# Patient Record
Sex: MALE | Race: WHITE
[De-identification: names, ages, dates, MRNs, and addresses within clinical notes are randomized per-mention and may not be internally consistent; named-entity substitution may affect disease eponyms.]

---

## 2019-03-01 ENCOUNTER — HOSPITAL ENCOUNTER (INPATIENT)
Dept: HOSPITAL 91 - 2NE | Age: 59
LOS: 21 days | Discharge: HOME | DRG: 330 | End: 2019-03-22
Payer: COMMERCIAL

## 2019-03-01 ENCOUNTER — HOSPITAL ENCOUNTER (INPATIENT)
Dept: HOSPITAL 10 - 6WM | Age: 59
LOS: 21 days | Discharge: HOME | DRG: 330 | End: 2019-03-22
Attending: INTERNAL MEDICINE | Admitting: HOSPITALIST
Payer: COMMERCIAL

## 2019-03-01 VITALS
HEIGHT: 74 IN | HEIGHT: 74 IN | WEIGHT: 182.32 LBS | BODY MASS INDEX: 23.4 KG/M2 | WEIGHT: 182.32 LBS | BODY MASS INDEX: 23.4 KG/M2

## 2019-03-01 VITALS — SYSTOLIC BLOOD PRESSURE: 95 MMHG | DIASTOLIC BLOOD PRESSURE: 54 MMHG | RESPIRATION RATE: 18 BRPM | HEART RATE: 89 BPM

## 2019-03-01 VITALS — HEART RATE: 92 BPM

## 2019-03-01 DIAGNOSIS — F17.200: ICD-10-CM

## 2019-03-01 DIAGNOSIS — E10.22: ICD-10-CM

## 2019-03-01 DIAGNOSIS — N18.2: ICD-10-CM

## 2019-03-01 DIAGNOSIS — E78.5: ICD-10-CM

## 2019-03-01 DIAGNOSIS — K62.5: ICD-10-CM

## 2019-03-01 DIAGNOSIS — D50.9: ICD-10-CM

## 2019-03-01 DIAGNOSIS — I25.10: ICD-10-CM

## 2019-03-01 DIAGNOSIS — E10.65: ICD-10-CM

## 2019-03-01 DIAGNOSIS — C18.4: Primary | ICD-10-CM

## 2019-03-01 DIAGNOSIS — K43.9: ICD-10-CM

## 2019-03-01 DIAGNOSIS — I12.9: ICD-10-CM

## 2019-03-01 DIAGNOSIS — R63.4: ICD-10-CM

## 2019-03-01 PROCEDURE — 80053 COMPREHEN METABOLIC PANEL: CPT

## 2019-03-01 PROCEDURE — 93306 TTE W/DOPPLER COMPLETE: CPT

## 2019-03-01 PROCEDURE — 84100 ASSAY OF PHOSPHORUS: CPT

## 2019-03-01 PROCEDURE — 88309 TISSUE EXAM BY PATHOLOGIST: CPT

## 2019-03-01 PROCEDURE — 78452 HT MUSCLE IMAGE SPECT MULT: CPT

## 2019-03-01 PROCEDURE — 71260 CT THORAX DX C+: CPT

## 2019-03-01 PROCEDURE — 80061 LIPID PANEL: CPT

## 2019-03-01 PROCEDURE — 85730 THROMBOPLASTIN TIME PARTIAL: CPT

## 2019-03-01 PROCEDURE — 86901 BLOOD TYPING SEROLOGIC RH(D): CPT

## 2019-03-01 PROCEDURE — 93017 CV STRESS TEST TRACING ONLY: CPT

## 2019-03-01 PROCEDURE — 85610 PROTHROMBIN TIME: CPT

## 2019-03-01 PROCEDURE — 76775 US EXAM ABDO BACK WALL LIM: CPT

## 2019-03-01 PROCEDURE — 86900 BLOOD TYPING SEROLOGIC ABO: CPT

## 2019-03-01 PROCEDURE — 97161 PT EVAL LOW COMPLEX 20 MIN: CPT

## 2019-03-01 PROCEDURE — A9500 TC99M SESTAMIBI: HCPCS

## 2019-03-01 PROCEDURE — 85670 THROMBIN TIME PLASMA: CPT

## 2019-03-01 PROCEDURE — 88305 TISSUE EXAM BY PATHOLOGIST: CPT

## 2019-03-01 PROCEDURE — 81003 URINALYSIS AUTO W/O SCOPE: CPT

## 2019-03-01 PROCEDURE — 74018 RADEX ABDOMEN 1 VIEW: CPT

## 2019-03-01 PROCEDURE — 85049 AUTOMATED PLATELET COUNT: CPT

## 2019-03-01 PROCEDURE — 88304 TISSUE EXAM BY PATHOLOGIST: CPT

## 2019-03-01 PROCEDURE — 86920 COMPATIBILITY TEST SPIN: CPT

## 2019-03-01 PROCEDURE — 90686 IIV4 VACC NO PRSV 0.5 ML IM: CPT

## 2019-03-01 PROCEDURE — 83036 HEMOGLOBIN GLYCOSYLATED A1C: CPT

## 2019-03-01 PROCEDURE — 36600 WITHDRAWAL OF ARTERIAL BLOOD: CPT

## 2019-03-01 PROCEDURE — 93971 EXTREMITY STUDY: CPT

## 2019-03-01 PROCEDURE — 93005 ELECTROCARDIOGRAM TRACING: CPT

## 2019-03-01 PROCEDURE — 84484 ASSAY OF TROPONIN QUANT: CPT

## 2019-03-01 PROCEDURE — 82728 ASSAY OF FERRITIN: CPT

## 2019-03-01 PROCEDURE — 80048 BASIC METABOLIC PNL TOTAL CA: CPT

## 2019-03-01 PROCEDURE — 82803 BLOOD GASES ANY COMBINATION: CPT

## 2019-03-01 PROCEDURE — 36430 TRANSFUSION BLD/BLD COMPNT: CPT

## 2019-03-01 PROCEDURE — 83735 ASSAY OF MAGNESIUM: CPT

## 2019-03-01 PROCEDURE — 0DBL8ZX EXCISION OF TRANSVERSE COLON, VIA NATURAL OR ARTIFICIAL OPENING ENDOSCOPIC, DIAGNOSTIC: ICD-10-PCS | Performed by: INTERNAL MEDICINE

## 2019-03-01 PROCEDURE — 78306 BONE IMAGING WHOLE BODY: CPT

## 2019-03-01 PROCEDURE — 82378 CARCINOEMBRYONIC ANTIGEN: CPT

## 2019-03-01 PROCEDURE — A9503 TC99M MEDRONATE: HCPCS

## 2019-03-01 PROCEDURE — P9016 RBC LEUKOCYTES REDUCED: HCPCS

## 2019-03-01 PROCEDURE — P9045 ALBUMIN (HUMAN), 5%, 250 ML: HCPCS

## 2019-03-01 PROCEDURE — 85025 COMPLETE CBC W/AUTO DIFF WBC: CPT

## 2019-03-01 PROCEDURE — 82962 GLUCOSE BLOOD TEST: CPT

## 2019-03-01 PROCEDURE — 74250 X-RAY XM SM INT 1CNTRST STD: CPT

## 2019-03-01 PROCEDURE — 74177 CT ABD & PELVIS W/CONTRAST: CPT

## 2019-03-01 PROCEDURE — 87086 URINE CULTURE/COLONY COUNT: CPT

## 2019-03-01 PROCEDURE — A9505 TL201 THALLIUM: HCPCS

## 2019-03-01 PROCEDURE — 83540 ASSAY OF IRON: CPT

## 2019-03-01 PROCEDURE — 86850 RBC ANTIBODY SCREEN: CPT

## 2019-03-01 NOTE — HP
Date/Time of Note


Date/Time of Note


DATE: 3/1/19 


TIME: 23:58





Assessment/Plan


VTE Prophylaxis


Pharmacological prophylaxis:  heparin





Lines/Catheters


IV Catheter Type (from Nrs):  Peripheral IV





Assessment/Plan


Assessment/Plan





58-year-old male with history of diabetes presents with abdominal pain, 


diarrhea, rectal bleeding presented to an outside hospital with CT showing 


possible colon lesion/mass





PLAN


GI consult for colonoscopy


Consider additional abdominal imaging


FOBT


PPI


Insulin for diabetes





HPI/ROS


Admit Date/Time


Admit Date/Time


Mar 1, 2019 at 20:38





Hx of Present Illness





58-year-old Farsi speaking male with history of type 1 diabetes who initially 


presented on outside hospital for abdominal pain, diarrhea, and rectal bleeding.


 He was transferred to [facility] for insurance reasons.  At 


the outside facility CT shows possible colon mass/lesion.  Note that part of the


information was gathered from talking to patient's daughter.  Patient was 


supposed to fly back to ER on a daily, but was canceled.  He needed to be 


convinced to stay here by his daughter.  Currently patient looks comfortable.





PMH/Family/Social


Past Medical History


Medications





Current Medications


Sodium Chloride 1,000 ml @  100 mls/hr Q10H IV ;  Start 3/1/19 at 23:43;  Status


UNV


IV Flush (NS 3 ml) 3 ml PER PROTOCOL IV ;  Start 3/2/19 at 00:00;  Status UNV


Ondansetron HCl (Zofran Inj) 4 mg Q6H  PRN IV NAUSEA/VOMITING;  Start 3/2/19 at 


00:00;  Status UNV


Acetaminophen (Tylenol Tab) 650 mg Q6H  PRN PO .PAIN 1-3 OR TEMP;  Start 3/2/19 


at 00:00;  Status UNV


Acetaminophen/ Hydrocodone Bitart (Norco (5/325)) 1 tab Q6H  PRN PO .PAIN 4-6;  


Start 3/2/19 at 00:00;  Status UNV


Zolpidem Tartrate (Ambien) 10 mg QHS  PRN PO .INSOMNIA;  Start 3/2/19 at 00:00; 


Status UNV


Famotidine (Pepcid Iv) 20 mg Q12 IV ;  Start 3/2/19 at 09:00;  Status UNV


Albuterol/ Ipratropium (Duoneb) 3 ml Q2H RESP THERAPY  PRN HHN SHORTNESS OF 


BREATH;  Start 3/2/19 at 00:00;  Status UNV


Miscellaneous Information (* Miscellaneous Pharmacy Order) Discontinue current 


oral sulfonylur... ONCE  ONCE XX ;  Start 3/2/19 at 00:00;  Stop 3/2/19 at 


00:01;  Status UNV


Diagnostic Test (Pha) (Accu-Chek) 1 ea 02 XX ;  Start 3/2/19 at 02:00;  Status 


UNV


Miscellaneous Information (* Miscellaneous Pharmacy Order) HYPOGLYCEMIA PROTOCOL


w... ONCE  ONCE XX ;  Start 3/2/19 at 00:00;  Stop 3/2/19 at 00:01;  Status UNV


Insulin Aspart (Novolog Insulin Pen) NOVOLOG *MILD* ALGORI... Q6 SC ;  Start 


3/2/19 at 00:00;  Status UNV


Miscellaneous Information (* Miscellaneous Pharmacy Order) Discontinue all 


previ... ONCE  ONCE XX ;  Start 3/2/19 at 00:00;  Stop 3/2/19 at 00:01;  Status 


UNV


Coded Allergies:  


     No Known Allergy (Unverified , 3/1/19)





Social History


Smoking Status:  Current every day smoker





Exam/Review of Systems


Vital Signs


Vitals





Vital Signs


  Date      Temp  Pulse  Resp  B/P (MAP)   Pulse Ox  O2          O2 Flow    FiO2


Time                                                 Delivery    Rate


    3/1/19           92


     20:58


    3/1/19  99.8           18  95/54 (68)        98  Room Air


     20:55








Exam


Exam


Past Medical History: see hpi


Past Surgical History


Past Surgical Hx:  other (see hpi)





Family History


Significant Family History:  no pertinent family hx





Social History


Alcohol Use:  other


Smoking Status:  Unknown if ever smoked


Drug Use:  other


Medications





Exam


Eyes:  PERRL


ENMT:  mucosa pink and moist


Respiratory:  normal air movement


Cardiovascular:  nl pulses


Gastrointestinal:  soft


Extremities:  normal pulses











HELLEN LUIS MD                  Mar 1, 2019 23:58

## 2019-03-02 VITALS — SYSTOLIC BLOOD PRESSURE: 90 MMHG | DIASTOLIC BLOOD PRESSURE: 55 MMHG | RESPIRATION RATE: 18 BRPM | HEART RATE: 73 BPM

## 2019-03-02 VITALS — DIASTOLIC BLOOD PRESSURE: 61 MMHG | SYSTOLIC BLOOD PRESSURE: 102 MMHG | RESPIRATION RATE: 18 BRPM | HEART RATE: 86 BPM

## 2019-03-02 VITALS — DIASTOLIC BLOOD PRESSURE: 50 MMHG | RESPIRATION RATE: 20 BRPM | SYSTOLIC BLOOD PRESSURE: 90 MMHG | HEART RATE: 74 BPM

## 2019-03-02 VITALS — HEART RATE: 84 BPM

## 2019-03-02 VITALS — HEART RATE: 82 BPM

## 2019-03-02 VITALS — SYSTOLIC BLOOD PRESSURE: 97 MMHG | RESPIRATION RATE: 18 BRPM | DIASTOLIC BLOOD PRESSURE: 56 MMHG | HEART RATE: 82 BPM

## 2019-03-02 VITALS — DIASTOLIC BLOOD PRESSURE: 68 MMHG | RESPIRATION RATE: 18 BRPM | HEART RATE: 82 BPM | SYSTOLIC BLOOD PRESSURE: 109 MMHG

## 2019-03-02 VITALS — HEART RATE: 75 BPM

## 2019-03-02 VITALS — HEART RATE: 67 BPM

## 2019-03-02 VITALS — HEART RATE: 77 BPM

## 2019-03-02 VITALS — HEART RATE: 66 BPM

## 2019-03-02 LAB
ADD MAN DIFF?: NO
ALANINE AMINOTRANSFERASE: 17 IU/L (ref 13–69)
ALBUMIN/GLOBULIN RATIO: 1.03
ALBUMIN: 3.2 G/DL (ref 3.3–4.9)
ALKALINE PHOSPHATASE: 81 IU/L (ref 42–121)
ANION GAP: 8 (ref 5–13)
ASPARTATE AMINO TRANSFERASE: 11 IU/L (ref 15–46)
BASOPHIL #: 0 10^3/UL (ref 0–0.1)
BASOPHILS %: 0.2 % (ref 0–2)
BILIRUBIN,DIRECT: 0 MG/DL (ref 0–0.2)
BILIRUBIN,TOTAL: 0.1 MG/DL (ref 0.2–1.3)
BLOOD UREA NITROGEN: 16 MG/DL (ref 7–20)
CALCIUM: 9.1 MG/DL (ref 8.4–10.2)
CARBON DIOXIDE: 23 MMOL/L (ref 21–31)
CARCINOEMBRYONIC ANTIGEN: 2.2 NG/ML (ref 0–5)
CHLORIDE: 106 MMOL/L (ref 97–110)
CHOL/HDL RATIO: 7.6 RATIO
CHOLESTEROL: 137 MG/DL (ref 100–200)
CREATININE: 1.44 MG/DL (ref 0.61–1.24)
EOSINOPHILS #: 0.1 10^3/UL (ref 0–0.5)
EOSINOPHILS %: 0.7 % (ref 0–7)
GLOBULIN: 3.1 G/DL (ref 1.3–3.2)
GLUCOSE: 245 MG/DL (ref 70–220)
HDL CHOLESTEROL: 18 MG/DL (ref 30–78)
HEMATOCRIT: 26.7 % (ref 42–52)
HEMOGLOBIN A1C: 6.7 % (ref 0–5.9)
HEMOGLOBIN: 8.1 G/DL (ref 14–18)
IMMATURE GRANS #M: 0.05 10^3/UL (ref 0–0.03)
IMMATURE GRANS % (M): 0.5 % (ref 0–0.43)
LDL CHOLESTEROL,CALCULATED: 92 MG/DL
LYMPHOCYTES #: 2.3 10^3/UL (ref 0.8–2.9)
LYMPHOCYTES %: 21.6 % (ref 15–51)
MEAN CORPUSCULAR HEMOGLOBIN: 24 PG (ref 29–33)
MEAN CORPUSCULAR HGB CONC: 30.3 G/DL (ref 32–37)
MEAN CORPUSCULAR VOLUME: 79 FL (ref 82–101)
MEAN PLATELET VOLUME: 9.5 FL (ref 7.4–10.4)
MONOCYTE #: 0.7 10^3/UL (ref 0.3–0.9)
MONOCYTES %: 7 % (ref 0–11)
NEUTROPHIL #: 7.3 10^3/UL (ref 1.6–7.5)
NEUTROPHILS %: 70 % (ref 39–77)
NUCLEATED RED BLOOD CELLS #: 0 10^3/UL (ref 0–0)
NUCLEATED RED BLOOD CELLS%: 0 /100WBC (ref 0–0)
PLATELET COUNT: 326 10^3/UL (ref 140–415)
POTASSIUM: 5.1 MMOL/L (ref 3.5–5.1)
RED BLOOD COUNT: 3.38 10^6/UL (ref 4.7–6.1)
RED CELL DISTRIBUTION WIDTH: 16.2 % (ref 11.5–14.5)
SODIUM: 137 MMOL/L (ref 135–144)
TOTAL PROTEIN: 6.3 G/DL (ref 6.1–8.1)
TRIGLYCERIDES: 136 MG/DL (ref 0–149)
WHITE BLOOD COUNT: 10.5 10^3/UL (ref 4.8–10.8)

## 2019-03-02 RX ADMIN — DIPHENHYDRAMINE HYDROCHLORIDE SCH MG: 50 INJECTION, SOLUTION INTRAMUSCULAR; INTRAVENOUS at 21:51

## 2019-03-02 RX ADMIN — FOLIC ACID SCH MLS/HR: 5 INJECTION, SOLUTION INTRAMUSCULAR; INTRAVENOUS; SUBCUTANEOUS at 09:56

## 2019-03-02 RX ADMIN — ACETAMINOPHEN 1 MG: 325 TABLET, FILM COATED ORAL at 01:03

## 2019-03-02 RX ADMIN — THIAMINE HYDROCHLORIDE 1 MLS/HR: 100 INJECTION, SOLUTION INTRAMUSCULAR; INTRAVENOUS at 09:56

## 2019-03-02 RX ADMIN — INSULIN ASPART 1 UNIT: 100 INJECTION, SOLUTION INTRAVENOUS; SUBCUTANEOUS at 06:29

## 2019-03-02 RX ADMIN — THIAMINE HYDROCHLORIDE 1 MLS/HR: 100 INJECTION, SOLUTION INTRAMUSCULAR; INTRAVENOUS at 21:51

## 2019-03-02 RX ADMIN — THIAMINE HYDROCHLORIDE 1 MLS/HR: 100 INJECTION, SOLUTION INTRAMUSCULAR; INTRAVENOUS at 00:05

## 2019-03-02 RX ADMIN — INSULIN GLARGINE 1 UNITS: 100 INJECTION, SOLUTION SUBCUTANEOUS at 12:49

## 2019-03-02 RX ADMIN — FAMOTIDINE 1 MG: 10 INJECTION, SOLUTION INTRAVENOUS at 21:51

## 2019-03-02 RX ADMIN — FAMOTIDINE 1 MG: 10 INJECTION, SOLUTION INTRAVENOUS at 09:55

## 2019-03-02 RX ADMIN — INSULIN ASPART 1 UNIT: 100 INJECTION, SOLUTION INTRAVENOUS; SUBCUTANEOUS at 12:50

## 2019-03-02 RX ADMIN — INSULIN ASPART 1 UNIT: 100 INJECTION, SOLUTION INTRAVENOUS; SUBCUTANEOUS at 01:05

## 2019-03-02 RX ADMIN — INSULIN ASPART 1 UNIT: 100 INJECTION, SOLUTION INTRAVENOUS; SUBCUTANEOUS at 21:56

## 2019-03-02 RX ADMIN — FOLIC ACID SCH MLS/HR: 5 INJECTION, SOLUTION INTRAMUSCULAR; INTRAVENOUS; SUBCUTANEOUS at 21:51

## 2019-03-02 RX ADMIN — ZOLPIDEM TARTRATE 1 MG: 5 TABLET, FILM COATED ORAL at 00:34

## 2019-03-02 RX ADMIN — INSULIN ASPART 1 UNIT: 100 INJECTION, SOLUTION INTRAVENOUS; SUBCUTANEOUS at 17:29

## 2019-03-02 RX ADMIN — FOLIC ACID SCH MLS/HR: 5 INJECTION, SOLUTION INTRAMUSCULAR; INTRAVENOUS; SUBCUTANEOUS at 00:05

## 2019-03-02 RX ADMIN — ZOLPIDEM TARTRATE PRN MG: 5 TABLET, FILM COATED ORAL at 00:34

## 2019-03-02 RX ADMIN — INSULIN GLARGINE SCH UNITS: 100 INJECTION, SOLUTION SUBCUTANEOUS at 12:49

## 2019-03-02 RX ADMIN — DIPHENHYDRAMINE HYDROCHLORIDE SCH MG: 50 INJECTION, SOLUTION INTRAMUSCULAR; INTRAVENOUS at 09:55

## 2019-03-02 RX ADMIN — ZOLPIDEM TARTRATE PRN MG: 5 TABLET, FILM COATED ORAL at 21:54

## 2019-03-02 RX ADMIN — ZOLPIDEM TARTRATE 1 MG: 5 TABLET, FILM COATED ORAL at 21:54

## 2019-03-02 NOTE — CONS
Assessment/Plan


Assessment/Plan


Hospital Course (Demo Recall)


Summary Assessment and Plan:


Assessment:


Abdominal pain


Intermittent diarrhea x1 month


Abnormal imaging on noncontrast CT from [place]


   -Wall thickening of the hepatic flexure and transverse colon findings are 


suspicious for underlying mass


   -CEA negative


Unintentional weight loss (30-40 lbs over 5 months)


Microcytic hyperchromic anemia


CAD- Plavix currently on hold


DM


Renal insufficiency


Previous abdominal surgery





Plan:


Clear liquid diet


Plan for endoscopic evaluation Monday


Plan to start prep tomorrow


Endoscopy - risks/benefits/alternatives/indications of procedure and 


sedation/anesthesia discussed with patient who states understanding and gives 


informed consent to proceed. 


Patient seen in collaboration with Dr. Salinas


CC:   DERICK SALINAS ;





Consultation Date/Type/Reason


Admit Date/Time


Mar 1, 2019 at 20:38


Date of Consultation:  Mar 2, 2019


Type of Consult


GI


Reason for Consultation


Abdominal pain/diarrhea/abnormal imaging


Date/Time of Note


DATE: 3/2/19 


TIME: 13:33





Hx of Present Illness


This is a 58 year old male with PMH of DM, type 2, CAD who presented to [facility] for abdominal pain and intermittent diarrhea. Pt states pain 


began about 2 months ago, leaves or aggravated by anything in particular, with 


intermittent diarrhea for the past month.  Patient also notes a 30-40 pound 


weight loss, unintentionally over the past 5 months.  At [facility] 


patient had a CT scan abdomen/pelvis without contrast in at the hepatic flexure 


of the colon and transverse colon, there is a focal wall thickening and adjacent


fat stranding.  Findings are suspicious for an underlying mass.  Infection is 


less likely but not excluded.  There are several adjacent tiny lymph nodes which


are subcentimeter.  Findings appear unchanged compared with 1/30/19.  The right 


colon demonstrates stool and gas the appendix is not definitely visualized.  The


small bowel loops are nondilated.  Stomach demonstrates some fluid.  Answer to 


[facility] for further evaluation.  Currently patient appears 


comfortable he is on a clear liquid diet tolerating well he denies 


nausea/vomiting, hematemesis, hematochezia a stool for OB has been ordered.  


Given CT findings we will plan to proceed with colonoscopy on Monday reviewed 


risk/benefits with patient and patient's fianc both verbalized understanding 


agreeable to procedure.


Review of Systems: 


[A 12 system, review was conducted and is negative except as noted in the HPI or


 here.]





Past Medical History


Medications





Current Medications


Sodium Chloride 1,000 ml @  100 mls/hr Q10H IV  Last administered on 3/2/19at 


09:56; Admin Dose 100 MLS/HR;  Start 3/1/19 at 23:43


IV Flush (NS 3 ml) 3 ml PER PROTOCOL IV ;  Start 3/2/19 at 00:00


Ondansetron HCl (Zofran Inj) 4 mg Q6H  PRN IV NAUSEA/VOMITING;  Start 3/2/19 at 


00:00


Acetaminophen (Tylenol Tab) 650 mg Q6H  PRN PO .PAIN 1-3 OR TEMP Last 


administered on 3/2/19at 01:03; Admin Dose 650 MG;  Start 3/2/19 at 00:00


Acetaminophen/ Hydrocodone Bitart (Norco (5/325)) 1 tab Q6H  PRN PO .PAIN 4-6;  


Start 3/2/19 at 00:00


Zolpidem Tartrate (Ambien) 10 mg QHS  PRN PO .INSOMNIA Last administered on 


3/2/19at 00:34; Admin Dose 10 MG;  Start 3/2/19 at 00:00


Famotidine (Pepcid Iv) 20 mg Q12 IV  Last administered on 3/2/19at 09:55; Admin 


Dose 20 MG;  Start 3/2/19 at 09:00


Albuterol/ Ipratropium (Duoneb) 3 ml Q2H RESP THERAPY  PRN HHN SHORTNESS OF 


BREATH;  Start 3/2/19 at 00:00


Diagnostic Test (Pha) (Accu-Chek) 1 ea 02 XX ;  Start 3/2/19 at 02:00


Insulin Aspart (Novolog Insulin Pen) NOVOLOG *MILD* ALGORI... Q6 SC  Last 


administered on 3/2/19at 12:50; Admin Dose 3 UNIT;  Start 3/2/19 at 00:00


Miscellaneous Information 1 ea NOTE XX ;  Start 3/2/19 at 00:00


Glucose (Glutose) 15 gm Q15M  PRN PO DECREASED GLUCOSE;  Start 3/2/19 at 00:00


Glucose (Glutose) 22.5 gm Q15M  PRN PO DECREASED GLUCOSE;  Start 3/2/19 at 00:00


Dextrose (D50w Syringe) 25 ml Q15M  PRN IV DECREASED GLUCOSE;  Start 3/2/19 at 


00:00


Dextrose (D50w Syringe) 50 ml Q15M  PRN IV DECREASED GLUCOSE;  Start 3/2/19 at 


00:00


Glucagon (Glucagen) 1 mg Q15M  PRN IM DECREASED GLUCOSE;  Start 3/2/19 at 00:00


Glucose (Glutose) 15 gm Q15M  PRN BUCCAL DECREASED GLUCOSE;  Start 3/2/19 at 


00:00


Insulin Glargine (Lantus) 15 units DAILY@0800 SC  Last administered on 3/2/19at 


12:49; Admin Dose 15 UNITS;  Start 3/2/19 at 12:00


Allergies:  


Coded Allergies:  


     No Known Allergy (Unverified , 3/1/19)





Social History


Smoking Status:  Current every day smoker





Exam/Review of Systems


Exam


Vitals





Vital Signs


  Date      Temp  Pulse  Resp  B/P (MAP)   Pulse Ox  O2          O2 Flow    FiO2


Time                                                 Delivery    Rate


    3/2/19           77


     12:03


    3/2/19  98.5           18  90/55 (67)       100


     11:21


    3/1/19                                           Room Air


     20:55





Exam


PHYSICAL EXAMINATION:


GENERAL: Well developed, well nourished, alert & oriented x 3, in no acute 


distress


SKIN: Midline scar


EYES: Pupils equal reactive to light, no discharge.


EARS/NOSE AND THROAT: Ears normal, nose normal, oropharynx normal


NECK: Supple, no masses


CHEST: Inspection within normal limits.


CARDIOVASCULAR: Heart: Regular rate and rhythm


RESPIRATORY: Lungs clear to auscultation


GASTROINTESTINAL AND LIVER: Abdomen: Soft, non tenderness, non-distended, no 


hernias, no masses, no organomegaly, no ascites, no guarding, no rebound 


tenderness, normoactive bowel sounds. Rectal: Deferred. 





EXTREMITIES: No cyanosis, clubbing or edema.





Results


Result Diagram:  


3/2/19 0040                                                                     


          3/2/19 0040





Results 24hrs





Laboratory Tests


 Test
                                3/2/19
00:40  3/2/19
06:20  3/2/19
12:47


 White Blood Count                          10.5


 Red Blood Count                           3.38  L


 Hemoglobin                                 8.1  L


 Hematocrit                                26.7  L


 Mean Corpuscular Volume                   79.0  L


 Mean Corpuscular Hemoglobin               24.0  L


 Mean Corpuscular Hemoglobin
Concent      30.3  L
  
             



 Red Cell Distribution Width               16.2  H


 Platelet Count                              326


 Mean Platelet Volume                        9.5


 Immature Granulocytes %                  0.500  H


 Neutrophils %                              70.0


 Lymphocytes %                              21.6


 Monocytes %                                 7.0


 Eosinophils %                               0.7


 Basophils %                                 0.2


 Nucleated Red Blood Cells %                 0.0


 Immature Granulocytes #                  0.050  H


 Neutrophils #                               7.3


 Lymphocytes #                               2.3


 Monocytes #                                 0.7


 Eosinophils #                               0.1


 Basophils #                                 0.0


 Nucleated Red Blood Cells #                 0.0


 Sodium Level                                137


 Potassium Level                             5.1


 Chloride Level                              106


 Carbon Dioxide Level                         23


 Anion Gap                                     8


 Blood Urea Nitrogen                          16


 Creatinine                                1.44  H


 Est Glomerular Filtrat Rate
mL/min         50  L
  
             



 Glucose Level                              245  H


 Bedside Glucose                            248  H        240  H        252  H


 Hemoglobin A1c                             6.7  H


 Calcium Level                               9.1


 Total Bilirubin                            0.1  L


 Direct Bilirubin                           0.00


 Indirect Bilirubin                          0.1


 Aspartate Amino Transf
(AST/SGOT)          11  L
  
             



 Alanine Aminotransferase
(ALT/SGPT)         17  
  
             



 Alkaline Phosphatase                         81


 Total Protein                               6.3


 Albumin                                    3.2  L


 Globulin                                   3.10


 Albumin/Globulin Ratio                     1.03


 Triglycerides Level                         136


 Cholesterol Level                           137


 LDL Cholesterol, Calculated                  92


 HDL Cholesterol                             18  L


 Cholesterol/HDL Ratio                       7.6


 Carcinoembryonic Antigen                    2.2








Medications


Medication





Current Medications


Sodium Chloride 1,000 ml @  100 mls/hr Q10H IV  Last administered on 3/2/19at 


09:56; Admin Dose 100 MLS/HR;  Start 3/1/19 at 23:43


IV Flush (NS 3 ml) 3 ml PER PROTOCOL IV ;  Start 3/2/19 at 00:00


Ondansetron HCl (Zofran Inj) 4 mg Q6H  PRN IV NAUSEA/VOMITING;  Start 3/2/19 at 


00:00


Acetaminophen (Tylenol Tab) 650 mg Q6H  PRN PO .PAIN 1-3 OR TEMP Last 


administered on 3/2/19at 01:03; Admin Dose 650 MG;  Start 3/2/19 at 00:00


Acetaminophen/ Hydrocodone Bitart (Norco (5/325)) 1 tab Q6H  PRN PO .PAIN 4-6;  


Start 3/2/19 at 00:00


Zolpidem Tartrate (Ambien) 10 mg QHS  PRN PO .INSOMNIA Last administered on 3/2/


19at 00:34; Admin Dose 10 MG;  Start 3/2/19 at 00:00


Famotidine (Pepcid Iv) 20 mg Q12 IV  Last administered on 3/2/19at 09:55; Admin 


Dose 20 MG;  Start 3/2/19 at 09:00


Albuterol/ Ipratropium (Duoneb) 3 ml Q2H RESP THERAPY  PRN HHN SHORTNESS OF 


BREATH;  Start 3/2/19 at 00:00


Diagnostic Test (Pha) (Accu-Chek) 1 ea 02 XX ;  Start 3/2/19 at 02:00


Insulin Aspart (Novolog Insulin Pen) NOVOLOG *MILD* ALGORI... Q6 SC  Last 


administered on 3/2/19at 12:50; Admin Dose 3 UNIT;  Start 3/2/19 at 00:00


Miscellaneous Information 1 ea NOTE XX ;  Start 3/2/19 at 00:00


Glucose (Glutose) 15 gm Q15M  PRN PO DECREASED GLUCOSE;  Start 3/2/19 at 00:00


Glucose (Glutose) 22.5 gm Q15M  PRN PO DECREASED GLUCOSE;  Start 3/2/19 at 00:00


Dextrose (D50w Syringe) 25 ml Q15M  PRN IV DECREASED GLUCOSE;  Start 3/2/19 at 


00:00


Dextrose (D50w Syringe) 50 ml Q15M  PRN IV DECREASED GLUCOSE;  Start 3/2/19 at 


00:00


Glucagon (Glucagen) 1 mg Q15M  PRN IM DECREASED GLUCOSE;  Start 3/2/19 at 00:00


Glucose (Glutose) 15 gm Q15M  PRN BUCCAL DECREASED GLUCOSE;  Start 3/2/19 at 


00:00


Insulin Glargine (Lantus) 15 units DAILY@0800 SC  Last administered on 3/2/19at 


12:49; Admin Dose 15 UNITS;  Start 3/2/19 at 12:00











KURT COOPER              Mar 2, 2019 13:46

## 2019-03-02 NOTE — PN
Date/Time of Note


Date/Time of Note


DATE: 3/2/19 


TIME: 10:58





Assessment/Plan


VTE Prophylaxis


SCD applied (from Nsg):  Yes


Pharmacological prophylaxis:  NA/contraindicated


Pharm contraindication:  bleeding





Lines/Catheters


IV Catheter Type (from Nrsg):  Peripheral IV





Assessment/Plan


Hospital Course


1.  Abdominal pain with diarrhea and rectal bleeding secondary to colon mass


Mass was detected on CT abdomen at outside hospital


GI consultation obtained for colonoscopy


Clear diet for now





2.  Diabetes


Sugars currently elevate


A1c at 6.7


Start Lantus and continue sliding scale


Home regimen unknown





3.  Acute versus chronic kidney disease


Baseline unknown


Continue IV fluids for now and monitor





Prophylaxis: SCDs


Result Diagram:  


3/2/19 0040                                                                     


          3/2/19 0040





Results 24hrs





Laboratory Tests


        Test
                                3/2/19
00:40  3/2/19
06:20


        White Blood Count                          10.5


        Red Blood Count                           3.38  L


        Hemoglobin                                 8.1  L


        Hematocrit                                26.7  L


        Mean Corpuscular Volume                   79.0  L


        Mean Corpuscular Hemoglobin               24.0  L


        Mean Corpuscular Hemoglobin
Concent      30.3  L
  



        Red Cell Distribution Width               16.2  H


        Platelet Count                              326


        Mean Platelet Volume                        9.5


        Immature Granulocytes %                  0.500  H


        Neutrophils %                              70.0


        Lymphocytes %                              21.6


        Monocytes %                                 7.0


        Eosinophils %                               0.7


        Basophils %                                 0.2


        Nucleated Red Blood Cells %                 0.0


        Immature Granulocytes #                  0.050  H


        Neutrophils #                               7.3


        Lymphocytes #                               2.3


        Monocytes #                                 0.7


        Eosinophils #                               0.1


        Basophils #                                 0.0


        Nucleated Red Blood Cells #                 0.0


        Sodium Level                                137


        Potassium Level                             5.1


        Chloride Level                              106


        Carbon Dioxide Level                         23


        Anion Gap                                     8


        Blood Urea Nitrogen                          16


        Creatinine                                1.44  H


        Est Glomerular Filtrat Rate
mL/min         50  L
  



        Glucose Level                              245  H


        Bedside Glucose                            248  H        240  H


        Hemoglobin A1c                             6.7  H


        Calcium Level                               9.1


        Total Bilirubin                            0.1  L


        Direct Bilirubin                           0.00


        Indirect Bilirubin                          0.1


        Aspartate Amino Transf
(AST/SGOT)          11  L
  



        Alanine Aminotransferase
(ALT/SGPT)         17  
  



        Alkaline Phosphatase                         81


        Total Protein                               6.3


        Albumin                                    3.2  L


        Globulin                                   3.10


        Albumin/Globulin Ratio                     1.03


        Triglycerides Level                         136


        Cholesterol Level                           137


        LDL Cholesterol, Calculated                  92


        HDL Cholesterol                             18  L


        Cholesterol/HDL Ratio                       7.6


        Carcinoembryonic Antigen                    2.2








Subjective


24 Hr Interval Summary


Gastrointestinal:  pain





Exam/Review of Systems


Exam


Vitals





Vital Signs


  Date      Temp  Pulse  Resp  B/P (MAP)   Pulse Ox  O2          O2 Flow    FiO2


Time                                                 Delivery    Rate


    3/2/19           66


     08:17


    3/2/19  97.7           20  90/50 (63)       100


     07:11


    3/1/19                                           Room Air


     20:55





Constitutional:  alert, oriented


Respiratory:  clear to auscultation


Cardiovascular:  regular rate and rhythm


Gastrointestinal:  soft; 


   No distended


Musculoskeletal:  nl extremities to inspection





Results


Results 24hrs





Laboratory Tests


        Test
                                3/2/19
00:40  3/2/19
06:20


        White Blood Count                          10.5


        Red Blood Count                           3.38  L


        Hemoglobin                                 8.1  L


        Hematocrit                                26.7  L


        Mean Corpuscular Volume                   79.0  L


        Mean Corpuscular Hemoglobin               24.0  L


        Mean Corpuscular Hemoglobin
Concent      30.3  L
  



        Red Cell Distribution Width               16.2  H


        Platelet Count                              326


        Mean Platelet Volume                        9.5


        Immature Granulocytes %                  0.500  H


        Neutrophils %                              70.0


        Lymphocytes %                              21.6


        Monocytes %                                 7.0


        Eosinophils %                               0.7


        Basophils %                                 0.2


        Nucleated Red Blood Cells %                 0.0


        Immature Granulocytes #                  0.050  H


        Neutrophils #                               7.3


        Lymphocytes #                               2.3


        Monocytes #                                 0.7


        Eosinophils #                               0.1


        Basophils #                                 0.0


        Nucleated Red Blood Cells #                 0.0


        Sodium Level                                137


        Potassium Level                             5.1


        Chloride Level                              106


        Carbon Dioxide Level                         23


        Anion Gap                                     8


        Blood Urea Nitrogen                          16


        Creatinine                                1.44  H


        Est Glomerular Filtrat Rate
mL/min         50  L
  



        Glucose Level                              245  H


        Bedside Glucose                            248  H        240  H


        Hemoglobin A1c                             6.7  H


        Calcium Level                               9.1


        Total Bilirubin                            0.1  L


        Direct Bilirubin                           0.00


        Indirect Bilirubin                          0.1


        Aspartate Amino Transf
(AST/SGOT)          11  L
  



        Alanine Aminotransferase
(ALT/SGPT)         17  
  



        Alkaline Phosphatase                         81


        Total Protein                               6.3


        Albumin                                    3.2  L


        Globulin                                   3.10


        Albumin/Globulin Ratio                     1.03


        Triglycerides Level                         136


        Cholesterol Level                           137


        LDL Cholesterol, Calculated                  92


        HDL Cholesterol                             18  L


        Cholesterol/HDL Ratio                       7.6


        Carcinoembryonic Antigen                    2.2








Medications


Medication





Current Medications


Sodium Chloride 1,000 ml @  100 mls/hr Q10H IV  Last administered on 3/2/19at 


09:56; Admin Dose 100 MLS/HR;  Start 3/1/19 at 23:43


IV Flush (NS 3 ml) 3 ml PER PROTOCOL IV ;  Start 3/2/19 at 00:00


Ondansetron HCl (Zofran Inj) 4 mg Q6H  PRN IV NAUSEA/VOMITING;  Start 3/2/19 at 


00:00


Acetaminophen (Tylenol Tab) 650 mg Q6H  PRN PO .PAIN 1-3 OR TEMP Last 


administered on 3/2/19at 01:03; Admin Dose 650 MG;  Start 3/2/19 at 00:00


Acetaminophen/ Hydrocodone Bitart (Norco (5/325)) 1 tab Q6H  PRN PO .PAIN 4-6;  


Start 3/2/19 at 00:00


Zolpidem Tartrate (Ambien) 10 mg QHS  PRN PO .INSOMNIA Last administered on 


3/2/19at 00:34; Admin Dose 10 MG;  Start 3/2/19 at 00:00


Famotidine (Pepcid Iv) 20 mg Q12 IV  Last administered on 3/2/19at 09:55; Admin 


Dose 20 MG;  Start 3/2/19 at 09:00


Albuterol/ Ipratropium (Duoneb) 3 ml Q2H RESP THERAPY  PRN HHN SHORTNESS OF 


BREATH;  Start 3/2/19 at 00:00


Diagnostic Test (Pha) (Accu-Chek) 1 ea 02 XX ;  Start 3/2/19 at 02:00


Insulin Aspart (Novolog Insulin Pen) NOVOLOG *MILD* ALGORI... Q6 SC  Last 


administered on 3/2/19at 06:29; Admin Dose 3 UNIT;  Start 3/2/19 at 00:00


Miscellaneous Information 1 ea NOTE XX ;  Start 3/2/19 at 00:00


Glucose (Glutose) 15 gm Q15M  PRN PO DECREASED GLUCOSE;  Start 3/2/19 at 00:00


Glucose (Glutose) 22.5 gm Q15M  PRN PO DECREASED GLUCOSE;  Start 3/2/19 at 00:00


Dextrose (D50w Syringe) 25 ml Q15M  PRN IV DECREASED GLUCOSE;  Start 3/2/19 at 


00:00


Dextrose (D50w Syringe) 50 ml Q15M  PRN IV DECREASED GLUCOSE;  Start 3/2/19 at 


00:00


Glucagon (Glucagen) 1 mg Q15M  PRN IM DECREASED GLUCOSE;  Start 3/2/19 at 00:00


Glucose (Glutose) 15 gm Q15M  PRN BUCCAL DECREASED GLUCOSE;  Start 3/2/19 at 


00:00











FELICIA MELENDEZ                   Mar 2, 2019 11:02

## 2019-03-03 VITALS — HEART RATE: 79 BPM

## 2019-03-03 VITALS — RESPIRATION RATE: 17 BRPM | HEART RATE: 108 BPM | SYSTOLIC BLOOD PRESSURE: 95 MMHG | DIASTOLIC BLOOD PRESSURE: 56 MMHG

## 2019-03-03 VITALS — SYSTOLIC BLOOD PRESSURE: 102 MMHG | DIASTOLIC BLOOD PRESSURE: 57 MMHG | HEART RATE: 88 BPM | RESPIRATION RATE: 20 BRPM

## 2019-03-03 VITALS — HEART RATE: 75 BPM | RESPIRATION RATE: 18 BRPM | SYSTOLIC BLOOD PRESSURE: 101 MMHG | DIASTOLIC BLOOD PRESSURE: 55 MMHG

## 2019-03-03 VITALS — RESPIRATION RATE: 17 BRPM | HEART RATE: 80 BPM | DIASTOLIC BLOOD PRESSURE: 52 MMHG | SYSTOLIC BLOOD PRESSURE: 108 MMHG

## 2019-03-03 VITALS — DIASTOLIC BLOOD PRESSURE: 52 MMHG | SYSTOLIC BLOOD PRESSURE: 90 MMHG | HEART RATE: 71 BPM | RESPIRATION RATE: 20 BRPM

## 2019-03-03 VITALS — SYSTOLIC BLOOD PRESSURE: 115 MMHG | RESPIRATION RATE: 18 BRPM | HEART RATE: 79 BPM | DIASTOLIC BLOOD PRESSURE: 72 MMHG

## 2019-03-03 VITALS — HEART RATE: 70 BPM

## 2019-03-03 VITALS — HEART RATE: 75 BPM

## 2019-03-03 VITALS — HEART RATE: 74 BPM | SYSTOLIC BLOOD PRESSURE: 98 MMHG | DIASTOLIC BLOOD PRESSURE: 59 MMHG | RESPIRATION RATE: 17 BRPM

## 2019-03-03 VITALS — HEART RATE: 85 BPM

## 2019-03-03 LAB
ADD MAN DIFF?: NO
ANION GAP: 7 (ref 5–13)
BASOPHIL #: 0 10^3/UL (ref 0–0.1)
BASOPHILS %: 0.3 % (ref 0–2)
BLOOD UREA NITROGEN: 13 MG/DL (ref 7–20)
CALCIUM: 9.2 MG/DL (ref 8.4–10.2)
CARBON DIOXIDE: 24 MMOL/L (ref 21–31)
CHLORIDE: 108 MMOL/L (ref 97–110)
CREATININE: 1.24 MG/DL (ref 0.61–1.24)
EOSINOPHILS #: 0.1 10^3/UL (ref 0–0.5)
EOSINOPHILS %: 1.4 % (ref 0–7)
GLUCOSE: 97 MG/DL (ref 70–220)
HEMATOCRIT: 26.6 % (ref 42–52)
HEMOGLOBIN: 8.1 G/DL (ref 14–18)
IMMATURE GRANS #M: 0.05 10^3/UL (ref 0–0.03)
IMMATURE GRANS % (M): 0.5 % (ref 0–0.43)
LYMPHOCYTES #: 2.2 10^3/UL (ref 0.8–2.9)
LYMPHOCYTES %: 21.6 % (ref 15–51)
MAGNESIUM: 1.8 MG/DL (ref 1.7–2.5)
MEAN CORPUSCULAR HEMOGLOBIN: 24.2 PG (ref 29–33)
MEAN CORPUSCULAR HGB CONC: 30.5 G/DL (ref 32–37)
MEAN CORPUSCULAR VOLUME: 79.4 FL (ref 82–101)
MEAN PLATELET VOLUME: 10.2 FL (ref 7.4–10.4)
MONOCYTE #: 0.8 10^3/UL (ref 0.3–0.9)
MONOCYTES %: 8 % (ref 0–11)
NEUTROPHIL #: 6.9 10^3/UL (ref 1.6–7.5)
NEUTROPHILS %: 68.2 % (ref 39–77)
NUCLEATED RED BLOOD CELLS #: 0 10^3/UL (ref 0–0)
NUCLEATED RED BLOOD CELLS%: 0 /100WBC (ref 0–0)
PHOSPHORUS: 4.4 MG/DL (ref 2.5–4.9)
PLATELET COUNT: 345 10^3/UL (ref 140–415)
POTASSIUM: 3.8 MMOL/L (ref 3.5–5.1)
RED BLOOD COUNT: 3.35 10^6/UL (ref 4.7–6.1)
RED CELL DISTRIBUTION WIDTH: 15.9 % (ref 11.5–14.5)
SODIUM: 139 MMOL/L (ref 135–144)
WHITE BLOOD COUNT: 10.1 10^3/UL (ref 4.8–10.8)

## 2019-03-03 RX ADMIN — INSULIN ASPART 1 UNIT: 100 INJECTION, SOLUTION INTRAVENOUS; SUBCUTANEOUS at 05:30

## 2019-03-03 RX ADMIN — POLYETHYLENE GLYCOL 3350 1 GM: 17 POWDER, FOR SOLUTION ORAL at 18:44

## 2019-03-03 RX ADMIN — INSULIN ASPART 1 UNIT: 100 INJECTION, SOLUTION INTRAVENOUS; SUBCUTANEOUS at 00:00

## 2019-03-03 RX ADMIN — INSULIN ASPART 1 UNIT: 100 INJECTION, SOLUTION INTRAVENOUS; SUBCUTANEOUS at 07:42

## 2019-03-03 RX ADMIN — MAGESIUM CITRATE 1 ML: 1.75 LIQUID ORAL at 17:00

## 2019-03-03 RX ADMIN — INSULIN ASPART 1 UNIT: 100 INJECTION, SOLUTION INTRAVENOUS; SUBCUTANEOUS at 20:26

## 2019-03-03 RX ADMIN — FAMOTIDINE 1 MG: 10 INJECTION, SOLUTION INTRAVENOUS at 20:27

## 2019-03-03 RX ADMIN — INSULIN ASPART 1 UNIT: 100 INJECTION, SOLUTION INTRAVENOUS; SUBCUTANEOUS at 17:18

## 2019-03-03 RX ADMIN — INSULIN GLARGINE SCH UNITS: 100 INJECTION, SOLUTION SUBCUTANEOUS at 07:42

## 2019-03-03 RX ADMIN — BISACODYL 1 MG: 5 TABLET, COATED ORAL at 14:39

## 2019-03-03 RX ADMIN — THIAMINE HYDROCHLORIDE 1 MLS/HR: 100 INJECTION, SOLUTION INTRAMUSCULAR; INTRAVENOUS at 05:29

## 2019-03-03 RX ADMIN — FAMOTIDINE 1 MG: 10 INJECTION, SOLUTION INTRAVENOUS at 07:34

## 2019-03-03 RX ADMIN — DIPHENHYDRAMINE HYDROCHLORIDE SCH MG: 50 INJECTION, SOLUTION INTRAMUSCULAR; INTRAVENOUS at 07:34

## 2019-03-03 RX ADMIN — DIPHENHYDRAMINE HYDROCHLORIDE SCH MG: 50 INJECTION, SOLUTION INTRAMUSCULAR; INTRAVENOUS at 20:27

## 2019-03-03 RX ADMIN — FOLIC ACID SCH MLS/HR: 5 INJECTION, SOLUTION INTRAMUSCULAR; INTRAVENOUS; SUBCUTANEOUS at 05:29

## 2019-03-03 RX ADMIN — INSULIN ASPART 1 UNIT: 100 INJECTION, SOLUTION INTRAVENOUS; SUBCUTANEOUS at 12:45

## 2019-03-03 RX ADMIN — INSULIN GLARGINE 1 UNITS: 100 INJECTION, SOLUTION SUBCUTANEOUS at 07:42

## 2019-03-03 NOTE — PN
Date/Time of Note


Date/Time of Note


DATE: 3/3/19 


TIME: 13:59





Assessment/Plan


VTE Prophylaxis


Risk score (from Ns)>0 risk:  1


SCD applied (from Nsg):  Yes


Pharmacological prophylaxis:  other (scds)





Lines/Catheters


IV Catheter Type (from Nrs):  Saline Lock


Urinary Cath still in place:  No





Assessment/Plan


Hospital Course


Summary Assessment and Plan:


Assessment:


Abdominal pain


Intermittent diarrhea x1 month


Abnormal imaging on noncontrast CT from [facility]


   -Wall thickening of the hepatic flexure and transverse colon findings are 


suspicious for underlying mass


   -CEA negative


Unintentional weight loss (30-40 lbs over 5 months)


Microcytic hyperchromic anemia


CAD- Plavix currently on hold


DM


Renal insufficiency


Previous abdominal surgery





Plan:


Clear liquid diet today


NPO after 3/4/19 0800


Colonoscopy tomorrow


Endoscopy - risks/benefits/alternatives/indications of procedure and sedati


on/anesthesia discussed with patient who states understanding and gives informed


consent to proceed. 


Patient seen in collaboration with Dr. Argueta











Subjective:


Course reviewed with nursing staff


Patient interviewed and examined


All labs, imaging and other results reviewed





The patient resting in bed, appears comfortable


Discussed plan for colonoscopy tomorrow, reviewed risks/benefits of sedation and


colonoscopy


Patient verbalized understanding and is agreeable








PHYSICAL EXAMINATION:


GENERAL:Alert & oriented x 3, in no acute distress


SKIN: Midline scar


EYES: Pupils equal reactive to light, no discharge.


EARS/NOSE AND THROAT: Ears normal, nose normal, oropharynx normal


NECK: Supple, no masses


CHEST: Inspection within normal limits.


CARDIOVASCULAR: Heart: Regular rate and rhythm


RESPIRATORY: Lungs clear to auscultation


GASTROINTESTINAL AND LIVER: Abdomen: Soft, generalized abd tenderness, non-


distended, no hernias, no masses, no organomegaly, no ascites, no guarding, no 


rebound tenderness, normoactive bowel sounds. Rectal: Deferred. 


EXTREMITIES: No cyanosis, clubbing or edema.


Result Diagram:  


3/3/19 0459                                                                     


          3/3/19 0459





Results 24hrs





Laboratory Tests


Test
                     3/2/19
17:04  3/2/19
21:55  3/3/19
04:59  3/3/19
05:27


Bedside Glucose                  207           104                         134


White Blood Count                                           10.1


Red Blood Count                                            3.35  L


Hemoglobin                                                  8.1  L


Hematocrit                                                 26.6  L


Mean Corpuscular Volume                                    79.4  L


Mean Corpuscular                                           24.2  L


Hemoglobin


Mean Corpuscular          
             
                 30.5  L
  



Hemoglobin
Concent


Red Cell Distribution                                      15.9  H


Width


Platelet Count                                               345


Mean Platelet Volume                                        10.2


Immature Granulocytes %                                   0.500  H


Neutrophils %                                               68.2


Lymphocytes %                                               21.6


Monocytes %                                                  8.0


Eosinophils %                                                1.4


Basophils %                                                  0.3


Nucleated Red Blood                                          0.0


Cells %


Immature Granulocytes #                                   0.050  H


Neutrophils #                                                6.9


Lymphocytes #                                                2.2


Monocytes #                                                  0.8


Eosinophils #                                                0.1


Basophils #                                                  0.0


Nucleated Red Blood                                          0.0


Cells #


Sodium Level                                                 139


Potassium Level                                              3.8


Chloride Level                                               108


Carbon Dioxide Level                                          24


Anion Gap                                                      7


Blood Urea Nitrogen                                           13


Creatinine                                                  1.24


Est Glomerular Filtrat    
             
                    60  
  



Rate
mL/min


Glucose Level                                                97  #


Calcium Level                                                9.2


Phosphorus Level                                             4.4


Magnesium Level                                              1.8


Test
                     3/3/19
07:33  3/3/19
12:20  
             



Bedside Glucose                  142          239  H








Exam/Review of Systems


Exam


Vitals





Vital Signs


  Date      Temp  Pulse  Resp  B/P (MAP)   Pulse Ox  O2          O2 Flow    FiO2


Time                                                 Delivery    Rate


    3/3/19           79


     12:25


    3/3/19  98.2           17      108/52        99


     11:32                           (70)


    3/1/19                                           Room Air


     20:55








Intake and Output





3/2/19


3/2/19


3/3/19





1515:00


23:00


07:00





IntakeIntake Total


1600 ml


800 ml





BalanceBalance


1600 ml


800 ml














Results


Results 24hrs





Laboratory Tests


Test
                     3/2/19
17:04  3/2/19
21:55  3/3/19
04:59  3/3/19
05:27


Bedside Glucose                  207           104                         134


White Blood Count                                           10.1


Red Blood Count                                            3.35  L


Hemoglobin                                                  8.1  L


Hematocrit                                                 26.6  L


Mean Corpuscular Volume                                    79.4  L


Mean Corpuscular                                           24.2  L


Hemoglobin


Mean Corpuscular          
             
                 30.5  L
  



Hemoglobin
Concent


Red Cell Distribution                                      15.9  H


Width


Platelet Count                                               345


Mean Platelet Volume                                        10.2


Immature Granulocytes %                                   0.500  H


Neutrophils %                                               68.2


Lymphocytes %                                               21.6


Monocytes %                                                  8.0


Eosinophils %                                                1.4


Basophils %                                                  0.3


Nucleated Red Blood                                          0.0


Cells %


Immature Granulocytes #                                   0.050  H


Neutrophils #                                                6.9


Lymphocytes #                                                2.2


Monocytes #                                                  0.8


Eosinophils #                                                0.1


Basophils #                                                  0.0


Nucleated Red Blood                                          0.0


Cells #


Sodium Level                                                 139


Potassium Level                                              3.8


Chloride Level                                               108


Carbon Dioxide Level                                          24


Anion Gap                                                      7


Blood Urea Nitrogen                                           13


Creatinine                                                  1.24


Est Glomerular Filtrat    
             
                    60  
  



Rate
mL/min


Glucose Level                                                97  #


Calcium Level                                                9.2


Phosphorus Level                                             4.4


Magnesium Level                                              1.8


Test
                     3/3/19
07:33  3/3/19
12:20  
             



Bedside Glucose                  142          239  H








Medications


Medication





Current Medications


IV Flush (NS 3 ml) 3 ml PER PROTOCOL IV ;  Start 3/2/19 at 00:00


Ondansetron HCl (Zofran Inj) 4 mg Q6H  PRN IV NAUSEA/VOMITING;  Start 3/2/19 at 


00:00


Acetaminophen (Tylenol Tab) 650 mg Q6H  PRN PO .PAIN 1-3 OR TEMP Last 


administered on 3/2/19at 01:03; Admin Dose 650 MG;  Start 3/2/19 at 00:00


Acetaminophen/ Hydrocodone Bitart (Norco (5/325)) 1 tab Q6H  PRN PO .PAIN 4-6;  


Start 3/2/19 at 00:00


Zolpidem Tartrate (Ambien) 10 mg QHS  PRN PO .INSOMNIA Last administered on 


3/2/19at 21:54; Admin Dose 10 MG;  Start 3/2/19 at 00:00


Famotidine (Pepcid Iv) 20 mg Q12 IV  Last administered on 3/3/19at 07:34; Admin 


Dose 20 MG;  Start 3/2/19 at 09:00


Albuterol/ Ipratropium (Duoneb) 3 ml Q2H RESP THERAPY  PRN HHN SHORTNESS OF 


BREATH;  Start 3/2/19 at 00:00


Diagnostic Test (Pha) (Accu-Chek) 1 ea 02 XX ;  Start 3/2/19 at 02:00


Miscellaneous Information 1 ea NOTE XX ;  Start 3/2/19 at 00:00


Glucose (Glutose) 15 gm Q15M  PRN PO DECREASED GLUCOSE;  Start 3/2/19 at 00:00


Glucose (Glutose) 22.5 gm Q15M  PRN PO DECREASED GLUCOSE;  Start 3/2/19 at 00:00


Dextrose (D50w Syringe) 25 ml Q15M  PRN IV DECREASED GLUCOSE;  Start 3/2/19 at 


00:00


Dextrose (D50w Syringe) 50 ml Q15M  PRN IV DECREASED GLUCOSE;  Start 3/2/19 at 


00:00


Glucagon (Glucagen) 1 mg Q15M  PRN IM DECREASED GLUCOSE;  Start 3/2/19 at 00:00


Glucose (Glutose) 15 gm Q15M  PRN BUCCAL DECREASED GLUCOSE;  Start 3/2/19 at 


00:00


Insulin Glargine (Lantus) 15 units DAILY@0800 SC  Last administered on 3/3/19at 


07:42; Admin Dose 15 UNITS;  Start 3/2/19 at 12:00


Insulin Aspart (Novolog Insulin Pen) NOVOLOG *MODERATE* ALGORITHM WITH MEALS  


BEDTIME SC  Last administered on 3/3/19at 12:45; Admin Dose 3 UNIT;  Start 


3/3/19 at 13:00











KURT COOPER              Mar 3, 2019 14:06

## 2019-03-03 NOTE — PN
Date/Time of Note


Date/Time of Note


DATE: 3/3/19 


TIME: 17:16





Assessment/Plan


VTE Prophylaxis


Risk score (from Ns)>0 risk:  1


SCD applied (from Ns):  Yes


Pharmacological prophylaxis:  NA/contraindicated


Pharm contraindication:  surgical contra





Lines/Catheters


IV Catheter Type (from [facility]):  Saline Lock


Urinary Cath still in place:  No





Assessment/Plan


Hospital Course


1.  Abdominal pain with diarrhea and rectal bleeding secondary to colon mass


Mass was detected on CT abdomen at outside hospital


GI consultation appreciated, colonoscopy tomorrow





2.  Diabetes


Sugars currently stable


A1c at 6.7


Continue Lantus and continue sliding scale


Home regimen unknown





3.  Prerenal MARILUZ


Renal function improved with fluids


Baseline creatinine unknown


Status post fluids 





Prophylaxis: SCDs





DC planning: Colonoscopy planned for tomorrow, patient may require lamont-


colectomy during this hospitalization


Result Diagram:  


3/3/19 0459                                                                     


          3/3/19 0459





Results 24hrs





Laboratory Tests


Test
                     3/2/19
21:55  3/3/19
04:59  3/3/19
05:27  3/3/19
07:33


Bedside Glucose                  104                         134           142


White Blood Count                             10.1


Red Blood Count                              3.35  L


Hemoglobin                                    8.1  L


Hematocrit                                   26.6  L


Mean Corpuscular Volume                      79.4  L


Mean Corpuscular                             24.2  L


Hemoglobin


Mean Corpuscular          
                 30.5  L
  
             



Hemoglobin
Concent


Red Cell Distribution                        15.9  H


Width


Platelet Count                                 345


Mean Platelet Volume                          10.2


Immature Granulocytes %                     0.500  H


Neutrophils %                                 68.2


Lymphocytes %                                 21.6


Monocytes %                                    8.0


Eosinophils %                                  1.4


Basophils %                                    0.3


Nucleated Red Blood                            0.0


Cells %


Immature Granulocytes #                     0.050  H


Neutrophils #                                  6.9


Lymphocytes #                                  2.2


Monocytes #                                    0.8


Eosinophils #                                  0.1


Basophils #                                    0.0


Nucleated Red Blood                            0.0


Cells #


Sodium Level                                   139


Potassium Level                                3.8


Chloride Level                                 108


Carbon Dioxide Level                            24


Anion Gap                                        7


Blood Urea Nitrogen                             13


Creatinine                                    1.24


Est Glomerular Filtrat    
                    60  
  
             



Rate
mL/min


Glucose Level                                  97  #


Calcium Level                                  9.2


Phosphorus Level                               4.4


Magnesium Level                                1.8


Test
                     3/3/19
12:20  3/3/19
16:59  
             



Bedside Glucose                 239  H         187








Subjective


24 Hr Interval Summary


Constitutional:  no complaints





Exam/Review of Systems


Exam


Vitals





Vital Signs


  Date      Temp  Pulse  Resp  B/P (MAP)   Pulse Ox  O2          O2 Flow    FiO2


Time                                                 Delivery    Rate


    3/3/19           75


     16:03


    3/3/19  98.1           17  98/59 (72)       100


     15:46


    3/1/19                                           Room Air


     20:55








Intake and Output





3/2/19


3/2/19


3/3/19





1515:00


23:00


07:00





IntakeIntake Total


1600 ml


800 ml





BalanceBalance


1600 ml


800 ml











Constitutional:  alert, oriented


Respiratory:  clear to auscultation


Cardiovascular:  regular rate and rhythm


Gastrointestinal:  soft; 


   No distended


Musculoskeletal:  nl extremities to inspection





Results


Results 24hrs





Laboratory Tests


Test
                     3/2/19
21:55  3/3/19
04:59  3/3/19
05:27  3/3/19
07:33


Bedside Glucose                  104                         134           142


White Blood Count                             10.1


Red Blood Count                              3.35  L


Hemoglobin                                    8.1  L


Hematocrit                                   26.6  L


Mean Corpuscular Volume                      79.4  L


Mean Corpuscular                             24.2  L


Hemoglobin


Mean Corpuscular          
                 30.5  L
  
             



Hemoglobin
Concent


Red Cell Distribution                        15.9  H


Width


Platelet Count                                 345


Mean Platelet Volume                          10.2


Immature Granulocytes %                     0.500  H


Neutrophils %                                 68.2


Lymphocytes %                                 21.6


Monocytes %                                    8.0


Eosinophils %                                  1.4


Basophils %                                    0.3


Nucleated Red Blood                            0.0


Cells %


Immature Granulocytes #                     0.050  H


Neutrophils #                                  6.9


Lymphocytes #                                  2.2


Monocytes #                                    0.8


Eosinophils #                                  0.1


Basophils #                                    0.0


Nucleated Red Blood                            0.0


Cells #


Sodium Level                                   139


Potassium Level                                3.8


Chloride Level                                 108


Carbon Dioxide Level                            24


Anion Gap                                        7


Blood Urea Nitrogen                             13


Creatinine                                    1.24


Est Glomerular Filtrat    
                    60  
  
             



Rate
mL/min


Glucose Level                                  97  #


Calcium Level                                  9.2


Phosphorus Level                               4.4


Magnesium Level                                1.8


Test
                     3/3/19
12:20  3/3/19
16:59  
             



Bedside Glucose                 239  H         187








Medications


Medication





Current Medications


IV Flush (NS 3 ml) 3 ml PER PROTOCOL IV ;  Start 3/2/19 at 00:00


Ondansetron HCl (Zofran Inj) 4 mg Q6H  PRN IV NAUSEA/VOMITING;  Start 3/2/19 at 


00:00


Acetaminophen (Tylenol Tab) 650 mg Q6H  PRN PO .PAIN 1-3 OR TEMP Last adminis


tered on 3/2/19at 01:03; Admin Dose 650 MG;  Start 3/2/19 at 00:00


Acetaminophen/ Hydrocodone Bitart (Norco (5/325)) 1 tab Q6H  PRN PO .PAIN 4-6;  


Start 3/2/19 at 00:00


Zolpidem Tartrate (Ambien) 10 mg QHS  PRN PO .INSOMNIA Last administered on 


3/2/19at 21:54; Admin Dose 10 MG;  Start 3/2/19 at 00:00


Famotidine (Pepcid Iv) 20 mg Q12 IV  Last administered on 3/3/19at 07:34; Admin 


Dose 20 MG;  Start 3/2/19 at 09:00


Albuterol/ Ipratropium (Duoneb) 3 ml Q2H RESP THERAPY  PRN HHN SHORTNESS OF 


BREATH;  Start 3/2/19 at 00:00


Diagnostic Test (Pha) (Accu-Chek) 1 ea 02 XX ;  Start 3/2/19 at 02:00


Miscellaneous Information 1 ea NOTE XX ;  Start 3/2/19 at 00:00


Glucose (Glutose) 15 gm Q15M  PRN PO DECREASED GLUCOSE;  Start 3/2/19 at 00:00


Glucose (Glutose) 22.5 gm Q15M  PRN PO DECREASED GLUCOSE;  Start 3/2/19 at 00:00


Dextrose (D50w Syringe) 25 ml Q15M  PRN IV DECREASED GLUCOSE;  Start 3/2/19 at 


00:00


Dextrose (D50w Syringe) 50 ml Q15M  PRN IV DECREASED GLUCOSE;  Start 3/2/19 at 


00:00


Glucagon (Glucagen) 1 mg Q15M  PRN IM DECREASED GLUCOSE;  Start 3/2/19 at 00:00


Glucose (Glutose) 15 gm Q15M  PRN BUCCAL DECREASED GLUCOSE;  Start 3/2/19 at 


00:00


Insulin Glargine (Lantus) 15 units DAILY@0800 SC  Last administered on 3/3/19at 


07:42; Admin Dose 15 UNITS;  Start 3/2/19 at 12:00


Insulin Aspart (Novolog Insulin Pen) NOVOLOG *MODERATE* ALGORITHM WITH MEALS  


BEDTIME SC  Last administered on 3/3/19at 12:45; Admin Dose 3 UNIT;  Start 


3/3/19 at 13:00


Magnesium Citrate (Citroma) 300 ml ONCE  ONCE PO ;  Start 3/3/19 at 17:30;  Stop


3/3/19 at 17:31


Polyethylene Glycol (Miralax) 119 gm ONCE  ONCE PO ;  Start 3/3/19 at 18:30;  


Stop 3/3/19 at 18:31


Polyethylene Glycol (Miralax) 119 gm 2ND DOSE (GI PREP) ONCE PO ;  Start 3/4/19 


at 06:00;  Stop 3/4/19 at 06:01


Bisacodyl (Dulcolax) 10 mg 2ND DOSE (GI PREP) ONCE PO ;  Start 3/4/19 at 08:00; 


Stop 3/4/19 at 08:01











FELICIA MELENDEZ                   Mar 3, 2019 17:19

## 2019-03-04 VITALS — HEART RATE: 70 BPM | SYSTOLIC BLOOD PRESSURE: 103 MMHG | DIASTOLIC BLOOD PRESSURE: 68 MMHG | RESPIRATION RATE: 20 BRPM

## 2019-03-04 VITALS — SYSTOLIC BLOOD PRESSURE: 100 MMHG | RESPIRATION RATE: 18 BRPM | HEART RATE: 117 BPM | DIASTOLIC BLOOD PRESSURE: 59 MMHG

## 2019-03-04 VITALS — SYSTOLIC BLOOD PRESSURE: 98 MMHG | RESPIRATION RATE: 15 BRPM | HEART RATE: 66 BPM | DIASTOLIC BLOOD PRESSURE: 65 MMHG

## 2019-03-04 VITALS — SYSTOLIC BLOOD PRESSURE: 91 MMHG | HEART RATE: 50 BPM | RESPIRATION RATE: 14 BRPM | DIASTOLIC BLOOD PRESSURE: 45 MMHG

## 2019-03-04 VITALS — HEART RATE: 75 BPM | RESPIRATION RATE: 18 BRPM | SYSTOLIC BLOOD PRESSURE: 99 MMHG | DIASTOLIC BLOOD PRESSURE: 55 MMHG

## 2019-03-04 VITALS — RESPIRATION RATE: 15 BRPM | SYSTOLIC BLOOD PRESSURE: 102 MMHG | HEART RATE: 65 BPM | DIASTOLIC BLOOD PRESSURE: 61 MMHG

## 2019-03-04 VITALS — RESPIRATION RATE: 18 BRPM | DIASTOLIC BLOOD PRESSURE: 56 MMHG | SYSTOLIC BLOOD PRESSURE: 93 MMHG | HEART RATE: 65 BPM

## 2019-03-04 VITALS — HEART RATE: 80 BPM | SYSTOLIC BLOOD PRESSURE: 108 MMHG | DIASTOLIC BLOOD PRESSURE: 55 MMHG | RESPIRATION RATE: 20 BRPM

## 2019-03-04 VITALS — RESPIRATION RATE: 16 BRPM | HEART RATE: 60 BPM | DIASTOLIC BLOOD PRESSURE: 52 MMHG | SYSTOLIC BLOOD PRESSURE: 96 MMHG

## 2019-03-04 VITALS — RESPIRATION RATE: 20 BRPM | HEART RATE: 69 BPM | SYSTOLIC BLOOD PRESSURE: 90 MMHG | DIASTOLIC BLOOD PRESSURE: 56 MMHG

## 2019-03-04 VITALS — HEART RATE: 66 BPM | DIASTOLIC BLOOD PRESSURE: 52 MMHG | RESPIRATION RATE: 19 BRPM | SYSTOLIC BLOOD PRESSURE: 96 MMHG

## 2019-03-04 VITALS — DIASTOLIC BLOOD PRESSURE: 69 MMHG | SYSTOLIC BLOOD PRESSURE: 104 MMHG | HEART RATE: 85 BPM | RESPIRATION RATE: 16 BRPM

## 2019-03-04 VITALS — HEART RATE: 72 BPM | DIASTOLIC BLOOD PRESSURE: 57 MMHG | RESPIRATION RATE: 20 BRPM | SYSTOLIC BLOOD PRESSURE: 103 MMHG

## 2019-03-04 VITALS — SYSTOLIC BLOOD PRESSURE: 92 MMHG | DIASTOLIC BLOOD PRESSURE: 50 MMHG | RESPIRATION RATE: 19 BRPM | HEART RATE: 60 BPM

## 2019-03-04 VITALS — HEART RATE: 66 BPM | DIASTOLIC BLOOD PRESSURE: 54 MMHG | RESPIRATION RATE: 18 BRPM | SYSTOLIC BLOOD PRESSURE: 92 MMHG

## 2019-03-04 VITALS — SYSTOLIC BLOOD PRESSURE: 96 MMHG | RESPIRATION RATE: 17 BRPM | DIASTOLIC BLOOD PRESSURE: 58 MMHG | HEART RATE: 61 BPM

## 2019-03-04 VITALS — SYSTOLIC BLOOD PRESSURE: 100 MMHG | DIASTOLIC BLOOD PRESSURE: 61 MMHG | HEART RATE: 69 BPM | RESPIRATION RATE: 18 BRPM

## 2019-03-04 VITALS — DIASTOLIC BLOOD PRESSURE: 63 MMHG | RESPIRATION RATE: 19 BRPM | SYSTOLIC BLOOD PRESSURE: 105 MMHG | HEART RATE: 78 BPM

## 2019-03-04 VITALS — DIASTOLIC BLOOD PRESSURE: 57 MMHG | HEART RATE: 68 BPM | RESPIRATION RATE: 15 BRPM | SYSTOLIC BLOOD PRESSURE: 97 MMHG

## 2019-03-04 VITALS — SYSTOLIC BLOOD PRESSURE: 95 MMHG | DIASTOLIC BLOOD PRESSURE: 58 MMHG | RESPIRATION RATE: 15 BRPM | HEART RATE: 78 BPM

## 2019-03-04 VITALS — RESPIRATION RATE: 15 BRPM | DIASTOLIC BLOOD PRESSURE: 55 MMHG | HEART RATE: 66 BPM | SYSTOLIC BLOOD PRESSURE: 98 MMHG

## 2019-03-04 VITALS — SYSTOLIC BLOOD PRESSURE: 91 MMHG | DIASTOLIC BLOOD PRESSURE: 46 MMHG | RESPIRATION RATE: 15 BRPM | HEART RATE: 62 BPM

## 2019-03-04 VITALS — HEART RATE: 73 BPM

## 2019-03-04 VITALS — RESPIRATION RATE: 16 BRPM | SYSTOLIC BLOOD PRESSURE: 94 MMHG | HEART RATE: 66 BPM | DIASTOLIC BLOOD PRESSURE: 58 MMHG

## 2019-03-04 VITALS — DIASTOLIC BLOOD PRESSURE: 59 MMHG | RESPIRATION RATE: 15 BRPM | HEART RATE: 76 BPM | SYSTOLIC BLOOD PRESSURE: 100 MMHG

## 2019-03-04 VITALS — SYSTOLIC BLOOD PRESSURE: 91 MMHG | DIASTOLIC BLOOD PRESSURE: 57 MMHG | RESPIRATION RATE: 15 BRPM | HEART RATE: 68 BPM

## 2019-03-04 VITALS — HEART RATE: 93 BPM

## 2019-03-04 VITALS — HEART RATE: 66 BPM

## 2019-03-04 VITALS — HEART RATE: 61 BPM | SYSTOLIC BLOOD PRESSURE: 107 MMHG | DIASTOLIC BLOOD PRESSURE: 51 MMHG | RESPIRATION RATE: 18 BRPM

## 2019-03-04 VITALS — HEART RATE: 63 BPM

## 2019-03-04 LAB
ANION GAP: 8 (ref 5–13)
BLOOD UREA NITROGEN: 11 MG/DL (ref 7–20)
CALCIUM: 9.2 MG/DL (ref 8.4–10.2)
CARBON DIOXIDE: 24 MMOL/L (ref 21–31)
CHLORIDE: 107 MMOL/L (ref 97–110)
CREATININE: 1.2 MG/DL (ref 0.61–1.24)
GLUCOSE: 159 MG/DL (ref 70–220)
POTASSIUM: 4.4 MMOL/L (ref 3.5–5.1)
SODIUM: 139 MMOL/L (ref 135–144)

## 2019-03-04 PROCEDURE — 0DN80ZZ RELEASE SMALL INTESTINE, OPEN APPROACH: ICD-10-PCS

## 2019-03-04 PROCEDURE — 0DN84ZZ RELEASE SMALL INTESTINE, PERCUTANEOUS ENDOSCOPIC APPROACH: ICD-10-PCS

## 2019-03-04 PROCEDURE — 0DTL0ZZ RESECTION OF TRANSVERSE COLON, OPEN APPROACH: ICD-10-PCS

## 2019-03-04 PROCEDURE — 0DNU0ZZ RELEASE OMENTUM, OPEN APPROACH: ICD-10-PCS

## 2019-03-04 PROCEDURE — 0DBL8ZX EXCISION OF TRANSVERSE COLON, VIA NATURAL OR ARTIFICIAL OPENING ENDOSCOPIC, DIAGNOSTIC: ICD-10-PCS

## 2019-03-04 PROCEDURE — 0DNE0ZZ RELEASE LARGE INTESTINE, OPEN APPROACH: ICD-10-PCS

## 2019-03-04 PROCEDURE — 3E0R3BZ INTRODUCTION OF ANESTHETIC AGENT INTO SPINAL CANAL, PERCUTANEOUS APPROACH: ICD-10-PCS

## 2019-03-04 PROCEDURE — 0DUE0KZ SUPPLEMENT LARGE INTESTINE WITH NONAUTOLOGOUS TISSUE SUBSTITUTE, OPEN APPROACH: ICD-10-PCS

## 2019-03-04 PROCEDURE — 30233N1 TRANSFUSION OF NONAUTOLOGOUS RED BLOOD CELLS INTO PERIPHERAL VEIN, PERCUTANEOUS APPROACH: ICD-10-PCS

## 2019-03-04 PROCEDURE — 0WQF0ZZ REPAIR ABDOMINAL WALL, OPEN APPROACH: ICD-10-PCS

## 2019-03-04 PROCEDURE — 00HU33Z INSERTION OF INFUSION DEVICE INTO SPINAL CANAL, PERCUTANEOUS APPROACH: ICD-10-PCS

## 2019-03-04 RX ADMIN — ACETAMINOPHEN 1 MG: 325 TABLET, FILM COATED ORAL at 17:56

## 2019-03-04 RX ADMIN — INSULIN ASPART 1 UNIT: 100 INJECTION, SOLUTION INTRAVENOUS; SUBCUTANEOUS at 17:23

## 2019-03-04 RX ADMIN — INSULIN ASPART 1 UNIT: 100 INJECTION, SOLUTION INTRAVENOUS; SUBCUTANEOUS at 11:48

## 2019-03-04 RX ADMIN — POLYETHYLENE GLYCOL 3350 1 GM: 17 POWDER, FOR SOLUTION ORAL at 05:50

## 2019-03-04 RX ADMIN — FAMOTIDINE 1 MG: 10 INJECTION, SOLUTION INTRAVENOUS at 20:17

## 2019-03-04 RX ADMIN — HYDROMORPHONE HYDROCHLORIDE PRN MG: 1 INJECTION, SOLUTION INTRAMUSCULAR; INTRAVENOUS; SUBCUTANEOUS at 18:55

## 2019-03-04 RX ADMIN — INSULIN GLARGINE SCH UNITS: 100 INJECTION, SOLUTION SUBCUTANEOUS at 08:00

## 2019-03-04 RX ADMIN — ZOLPIDEM TARTRATE PRN MG: 5 TABLET, FILM COATED ORAL at 00:44

## 2019-03-04 RX ADMIN — BISACODYL 1 MG: 5 TABLET, COATED ORAL at 07:55

## 2019-03-04 RX ADMIN — INSULIN GLARGINE 1 UNITS: 100 INJECTION, SOLUTION SUBCUTANEOUS at 08:00

## 2019-03-04 RX ADMIN — INSULIN ASPART 1 UNIT: 100 INJECTION, SOLUTION INTRAVENOUS; SUBCUTANEOUS at 08:04

## 2019-03-04 RX ADMIN — DIPHENHYDRAMINE HYDROCHLORIDE SCH MG: 50 INJECTION, SOLUTION INTRAMUSCULAR; INTRAVENOUS at 20:17

## 2019-03-04 RX ADMIN — FAMOTIDINE 1 MG: 10 INJECTION, SOLUTION INTRAVENOUS at 08:41

## 2019-03-04 RX ADMIN — INSULIN ASPART 1 UNIT: 100 INJECTION, SOLUTION INTRAVENOUS; SUBCUTANEOUS at 20:35

## 2019-03-04 RX ADMIN — ZOLPIDEM TARTRATE 1 MG: 5 TABLET, FILM COATED ORAL at 00:44

## 2019-03-04 RX ADMIN — HYDROMORPHONE HYDROCHLORIDE 1 MG: 1 INJECTION, SOLUTION INTRAMUSCULAR; INTRAVENOUS; SUBCUTANEOUS at 18:55

## 2019-03-04 RX ADMIN — DIPHENHYDRAMINE HYDROCHLORIDE SCH MG: 50 INJECTION, SOLUTION INTRAMUSCULAR; INTRAVENOUS at 08:41

## 2019-03-04 NOTE — PREAC
Date/Time of Note


Date/Time of Note


DATE: 3/4/19 


TIME: 11:00





Anesthesia Eval and Record


Evaluation


Time Pre-Procedure Interview


DATE: 3/4/19 


TIME: 11:00


Age


58


Sex


male


NPO:  8 hrs


Preoperative diagnosis


Possible colon mass


Planned procedure


Colonoscopy





Past Medical History


Cardio:  HTN, CAD, PTCA/Stent


Endo:  Diabetes


Pulm:  Smoking Hx


Renal:  CKD





Meds


Anticoagulation:  No (plavix d/c'd)


Beta Blocker within 24 hr:  No


Reason Beta Blocker not given:  Pt. not on B-Blocker (d/c'd on hospital 


admission)





Current Medications


IV Flush (NS 3 ml) 3 ml PER PROTOCOL IV ;  Start 3/2/19 at 00:00


Ondansetron HCl (Zofran Inj) 4 mg Q6H  PRN IV NAUSEA/VOMITING;  Start 3/2/19 at 


00:00


Acetaminophen (Tylenol Tab) 650 mg Q6H  PRN PO .PAIN 1-3 OR TEMP Last 


administered on 3/2/19at 01:03; Admin Dose 650 MG;  Start 3/2/19 at 00:00


Acetaminophen/ Hydrocodone Bitart (Norco (5/325)) 1 tab Q6H  PRN PO .PAIN 4-6;  


Start 3/2/19 at 00:00


Zolpidem Tartrate (Ambien) 10 mg QHS  PRN PO .INSOMNIA Last administered on 


3/4/19at 00:44; Admin Dose 10 MG;  Start 3/2/19 at 00:00


Famotidine (Pepcid Iv) 20 mg Q12 IV  Last administered on 3/4/19at 08:41; Admin 


Dose 20 MG;  Start 3/2/19 at 09:00


Albuterol/ Ipratropium (Duoneb) 3 ml Q2H RESP THERAPY  PRN HHN SHORTNESS OF 


BREATH;  Start 3/2/19 at 00:00


Diagnostic Test (Pha) (Accu-Chek) 1 ea 02 XX ;  Start 3/2/19 at 02:00


Miscellaneous Information 1 ea NOTE XX ;  Start 3/2/19 at 00:00


Glucose (Glutose) 15 gm Q15M  PRN PO DECREASED GLUCOSE;  Start 3/2/19 at 00:00


Glucose (Glutose) 22.5 gm Q15M  PRN PO DECREASED GLUCOSE;  Start 3/2/19 at 00:00


Dextrose (D50w Syringe) 25 ml Q15M  PRN IV DECREASED GLUCOSE;  Start 3/2/19 at 


00:00


Dextrose (D50w Syringe) 50 ml Q15M  PRN IV DECREASED GLUCOSE;  Start 3/2/19 at 


00:00


Glucagon (Glucagen) 1 mg Q15M  PRN IM DECREASED GLUCOSE;  Start 3/2/19 at 00:00


Glucose (Glutose) 15 gm Q15M  PRN BUCCAL DECREASED GLUCOSE;  Start 3/2/19 at 


00:00


Insulin Glargine (Lantus) 15 units DAILY@0800 SC  Last administered on 3/4/19at 


08:00; Admin Dose 15 UNITS;  Start 3/2/19 at 12:00


Insulin Aspart (Novolog Insulin Pen) NOVOLOG *MODERATE* ALGORITHM WITH MEALS  


BEDTIME SC  Last administered on 3/4/19at 08:04; Admin Dose 4 UNIT;  Start 


3/3/19 at 13:00


Meds reviewed:  Yes





Allergies


Coded Allergies:  


     No Known Allergy (Unverified , 3/1/19)


Allergies Reviewed:  Yes





Labs/Studies


Labs Reviewed:  Reviewed by anesthesiologist


Result Diagram:  


3/3/19 0459                                                                     


          3/4/19 0514





Laboratory Tests


3/4/19 05:14








Pregnancy test:  N/A





Pre-procedure Exam


Last vitals





Vital Signs


  Date      Temp  Pulse  Resp  B/P (MAP)   Pulse Ox  O2          O2 Flow    FiO2


Time                                                 Delivery    Rate


    3/4/19           73


     08:01


    3/4/19  97.8           18  99/55 (70)       100


     07:19


    3/4/19                                           Room Air


     00:07








ASA Physical Status


ASA physical status:  3





Planned Anesthetic


General/MAC:  MAC





Pre-operative Attestations


Prior to commencing anesthesia and surgery, the patient was re-evaluated, there 


was verification of:


*The patient's identity


*The results of appropriate recent lab work and preoperative vital signs


*The above evaluation not changing prior to induction


*Anesthetic plan, risk benefits, alternative and complications discussed with 


patient/family; questions answered; patient/family understands, accepts and 


wishes to proceed.











FATMATA RIOS CRNA                Mar 4, 2019 11:03

## 2019-03-04 NOTE — PREAC
Date/Time of Note


Date/Time of Note


DATE: 3/4/19 


TIME: 14:50





Anesthesia Eval and Record


Evaluation


Time Pre-Procedure Interview


DATE: 3/4/19 


TIME: 14:50


Age


58


Sex


male


NPO:  8 hrs


Preoperative diagnosis


Possible colon mass


Planned procedure


Colonoscopy





Past Medical History


Past Medical History:  Includes


Cardio:  HTN, CAD, PTCA/Stent


Endo:  Diabetes


Pulm:  Smoking Hx





Surgery & Anesthesia Issues


No known issue





Meds


Anticoagulation:  No


Beta Blocker within 24 hr:  No


Reason Beta Blocker not given:  Pt. not on B-Blocker





Current Medications


IV Flush (NS 3 ml) 3 ml PER PROTOCOL IV ;  Start 3/2/19 at 00:00


Ondansetron HCl (Zofran Inj) 4 mg Q6H  PRN IV NAUSEA/VOMITING;  Start 3/2/19 at 


00:00


Acetaminophen (Tylenol Tab) 650 mg Q6H  PRN PO .PAIN 1-3 OR TEMP Last 


administered on 3/2/19at 01:03; Admin Dose 650 MG;  Start 3/2/19 at 00:00


Acetaminophen/ Hydrocodone Bitart (Norco (5/325)) 1 tab Q6H  PRN PO .PAIN 4-6;  


Start 3/2/19 at 00:00


Zolpidem Tartrate (Ambien) 10 mg QHS  PRN PO .INSOMNIA Last administered on 


3/4/19at 00:44; Admin Dose 10 MG;  Start 3/2/19 at 00:00


Famotidine (Pepcid Iv) 20 mg Q12 IV  Last administered on 3/4/19at 08:41; Admin 


Dose 20 MG;  Start 3/2/19 at 09:00


Albuterol/ Ipratropium (Duoneb) 3 ml Q2H RESP THERAPY  PRN HHN SHORTNESS OF 


BREATH;  Start 3/2/19 at 00:00


Diagnostic Test (Pha) (Accu-Chek) 1 ea 02 XX ;  Start 3/2/19 at 02:00


Miscellaneous Information 1 ea NOTE XX ;  Start 3/2/19 at 00:00


Glucose (Glutose) 15 gm Q15M  PRN PO DECREASED GLUCOSE;  Start 3/2/19 at 00:00


Glucose (Glutose) 22.5 gm Q15M  PRN PO DECREASED GLUCOSE;  Start 3/2/19 at 00:00


Dextrose (D50w Syringe) 25 ml Q15M  PRN IV DECREASED GLUCOSE;  Start 3/2/19 at 


00:00


Dextrose (D50w Syringe) 50 ml Q15M  PRN IV DECREASED GLUCOSE;  Start 3/2/19 at 


00:00


Glucagon (Glucagen) 1 mg Q15M  PRN IM DECREASED GLUCOSE;  Start 3/2/19 at 00:00


Glucose (Glutose) 15 gm Q15M  PRN BUCCAL DECREASED GLUCOSE;  Start 3/2/19 at 


00:00


Insulin Glargine (Lantus) 15 units DAILY@0800 SC  Last administered on 3/4/19at 


08:00; Admin Dose 15 UNITS;  Start 3/2/19 at 12:00


Insulin Aspart (Novolog Insulin Pen) NOVOLOG *MODERATE* ALGORITHM WITH MEALS  


BEDTIME SC  Last administered on 3/4/19at 08:04; Admin Dose 4 UNIT;  Start 


3/3/19 at 13:00


Meds reviewed:  Yes





Allergies


Coded Allergies:  


     No Known Allergy (Unverified , 3/1/19)


Allergies Reviewed:  Yes





Labs/Studies


Labs Reviewed:  Reviewed by anesthesiologist


Result Diagram:  


3/3/19 0459                                                                     


          3/4/19 0514





Laboratory Tests


3/4/19 05:14








Pregnancy test:  N/A





Pre-procedure Exam


Last vitals





Vital Signs


  Date      Temp  Pulse  Resp  B/P (MAP)   Pulse Ox  O2          O2 Flow    FiO2


Time                                                 Delivery    Rate


    3/4/19  97.7     85    16      104/69       100  Room Air


     14:30                           (81)





Airway:  Adequate mouth opening


Mallampati:  Mallampati II


Teeth:  Normal


Lung:  Normal


Heart:  Normal





ASA Physical Status


ASA physical status:  3


Emergency:  None





Planned Anesthetic


General/MAC:  MAC





Pre-operative Attestations


Prior to commencing anesthesia and surgery, the patient was re-evaluated, there 


was verification of:


*The patient's identity


*The results of appropriate recent lab work and preoperative vital signs


*The above evaluation not changing prior to induction


*Anesthetic plan, risk benefits, alternative and complications discussed with 


patient/family; questions answered; patient/family understands, accepts and 


wishes to proceed.











REBEKAH NAVA                   Mar 4, 2019 14:51

## 2019-03-04 NOTE — HPN
Date/Time of Note


Date/Time of Note


DATE: 3/4/19 


TIME: 15:17





Interval H&P Admission Note


Pt. seen H&P reviewed:  No system changes











DERICK SALINAS                      Mar 4, 2019 15:18

## 2019-03-04 NOTE — PAC
Date/Time of Note


Date/Time of Note


DATE: 3/4/19 


TIME: 15:53





Post-Anesthesia Notes


Post-Anesthesia Note


Last documented vital signs





Vital Signs


  Date      Temp  Pulse  Resp  B/P (MAP)   Pulse Ox  O2          O2 Flow    FiO2


Time                                                 Delivery    Rate


    3/4/19  97.7     85    16      104/69       100  Room Air


     14:30                           (81)





Activity:  WNL


Respiratory function:  WNL


Cardiovascular function:  WNL


Mental status:  Baseline


Pain reasonably controlled:  Yes


Hydration appropriate:  Yes


Nausea/Vomiting absent:  Yes


Comments


BP: 98/50


HR: 69


RR: 15


T: 98


SaO2: 100%











ANNABELLA CESPEDES MD                 Mar 4, 2019 15:54

## 2019-03-04 NOTE — PN
Date/Time of Note


Date/Time of Note


DATE: 3/4/19 


TIME: 13:59





Assessment/Plan


VTE Prophylaxis


Risk score (from Nsg)>0 risk:  2


SCD applied (from Nsg):  Yes


Pharmacological prophylaxis:  heparin





Lines/Catheters


IV Catheter Type (from Nrsg):  Saline Lock


Urinary Cath still in place:  No





Assessment/Plan


Hospital Course


1.  Abdominal pain with diarrhea and rectal bleeding secondary to colon mass


Mass was detected on CT abdomen at outside hospital


GI consultation appreciated, colonoscopy todayt





2.  Diabetes


Sugars currently stable


A1c at 6.7


Continue Lantus and continue sliding scale


Home regimen unknown





3.  Prerenal MARILUZ


Renal function improved with fluids


Baseline creatinine unknown


Status post fluids 





Prophylaxis: SCDs





DC planning: Colonoscopy planned for today, patient may require lamont-colectomy 


during this hospitalization


Result Diagram:  


3/3/19 0459                                                                     


          3/4/19 0514





Results 24hrs





Laboratory Tests


Test
                     3/3/19
16:59  3/3/19
20:26  3/4/19
05:14  3/4/19
07:54


Bedside Glucose                  187           180                         201


Sodium Level                                                 139


Potassium Level                                              4.4


Chloride Level                                               107


Carbon Dioxide Level                                          24


Anion Gap                                                      8


Blood Urea Nitrogen                                           11


Creatinine                                                  1.20


Est Glomerular Filtrat    
             
             > 60  
       



Rate
mL/min


Glucose Level                                                159


Calcium Level                                                9.2


Test
                     3/4/19
11:46  
             
             



Bedside Glucose                  211








Subjective


24 Hr Interval Summary


Free Text/Dictation


Patient comfortable


Awaiting endoscopy





Exam/Review of Systems


Exam


Vitals





Vital Signs


  Date      Temp  Pulse  Resp  B/P (MAP)   Pulse Ox  O2          O2 Flow    FiO2


Time                                                 Delivery    Rate


    3/4/19           66


     12:01


    3/4/19  98.4           18      100/61        99


     11:21                           (74)


    3/4/19                                           Room Air


     00:07








Intake and Output





3/3/19


3/3/19


3/4/19





1515:00


23:00


07:00





IntakeIntake Total


1000 ml





BalanceBalance


1000 ml











Constitutional:  alert, oriented, well developed


Psych:  no complaints, nl mood/affect


Head:  normocephalic, atraumatic


Eyes:  nl conjunctiva, EOMI, nl lids, nl sclera, PERRL


ENMT:  nl external ears & nose, nl lips & teeth, nl nasal mucosa & septum


Neck:  supple, non-tender


Respiratory:  clear to auscultation, normal air movement


Cardiovascular:  regular rate and rhythm, nl pulses


Gastrointestinal:  soft, nl liver, spleen, non-tender


Musculoskeletal:  nl extremities to inspection, nl gait and stance


Extremities:  normal pulses


Neurological:  CNS II-XII intact, nl mental status, nl speech, nl strength


Skin:  nl turgor; 


   No rash or lesions


Lymph:  nl lymph nodes





Results


Results 24hrs





Laboratory Tests


Test
                     3/3/19
16:59  3/3/19
20:26  3/4/19
05:14  3/4/19
07:54


Bedside Glucose                  187           180                         201


Sodium Level                                                 139


Potassium Level                                              4.4


Chloride Level                                               107


Carbon Dioxide Level                                          24


Anion Gap                                                      8


Blood Urea Nitrogen                                           11


Creatinine                                                  1.20


Est Glomerular Filtrat    
             
             > 60  
       



Rate
mL/min


Glucose Level                                                159


Calcium Level                                                9.2


Test
                     3/4/19
11:46  
             
             



Bedside Glucose                  211








Medications


Medication





Current Medications


IV Flush (NS 3 ml) 3 ml PER PROTOCOL IV ;  Start 3/2/19 at 00:00


Ondansetron HCl (Zofran Inj) 4 mg Q6H  PRN IV NAUSEA/VOMITING;  Start 3/2/19 at 


00:00


Acetaminophen (Tylenol Tab) 650 mg Q6H  PRN PO .PAIN 1-3 OR TEMP Last 


administered on 3/2/19at 01:03; Admin Dose 650 MG;  Start 3/2/19 at 00:00


Acetaminophen/ Hydrocodone Bitart (Norco (5/325)) 1 tab Q6H  PRN PO .PAIN 4-6;  


Start 3/2/19 at 00:00


Zolpidem Tartrate (Ambien) 10 mg QHS  PRN PO .INSOMNIA Last administered on 


3/4/19at 00:44; Admin Dose 10 MG;  Start 3/2/19 at 00:00


Famotidine (Pepcid Iv) 20 mg Q12 IV  Last administered on 3/4/19at 08:41; Admin 


Dose 20 MG;  Start 3/2/19 at 09:00


Albuterol/ Ipratropium (Duoneb) 3 ml Q2H RESP THERAPY  PRN HHN SHORTNESS OF 


BREATH;  Start 3/2/19 at 00:00


Diagnostic Test (Pha) (Accu-Chek) 1 ea 02 XX ;  Start 3/2/19 at 02:00


Miscellaneous Information 1 ea NOTE XX ;  Start 3/2/19 at 00:00


Glucose (Glutose) 15 gm Q15M  PRN PO DECREASED GLUCOSE;  Start 3/2/19 at 00:00


Glucose (Glutose) 22.5 gm Q15M  PRN PO DECREASED GLUCOSE;  Start 3/2/19 at 00:00


Dextrose (D50w Syringe) 25 ml Q15M  PRN IV DECREASED GLUCOSE;  Start 3/2/19 at 


00:00


Dextrose (D50w Syringe) 50 ml Q15M  PRN IV DECREASED GLUCOSE;  Start 3/2/19 at 


00:00


Glucagon (Glucagen) 1 mg Q15M  PRN IM DECREASED GLUCOSE;  Start 3/2/19 at 00:00


Glucose (Glutose) 15 gm Q15M  PRN BUCCAL DECREASED GLUCOSE;  Start 3/2/19 at 


00:00


Insulin Glargine (Lantus) 15 units DAILY@0800 SC  Last administered on 3/4/19at 


08:00; Admin Dose 15 UNITS;  Start 3/2/19 at 12:00


Insulin Aspart (Novolog Insulin Pen) NOVOLOG *MODERATE* ALGORITHM WITH MEALS  


BEDTIME SC  Last administered on 3/4/19at 08:04; Admin Dose 4 UNIT;  Start 


3/3/19 at 13:00











HANNAH DOWNING MD           Mar 4, 2019 14:00

## 2019-03-05 VITALS — HEART RATE: 97 BPM | SYSTOLIC BLOOD PRESSURE: 97 MMHG | DIASTOLIC BLOOD PRESSURE: 59 MMHG | RESPIRATION RATE: 20 BRPM

## 2019-03-05 VITALS — RESPIRATION RATE: 18 BRPM | SYSTOLIC BLOOD PRESSURE: 103 MMHG | HEART RATE: 88 BPM | DIASTOLIC BLOOD PRESSURE: 63 MMHG

## 2019-03-05 VITALS — RESPIRATION RATE: 18 BRPM | HEART RATE: 70 BPM | SYSTOLIC BLOOD PRESSURE: 97 MMHG | DIASTOLIC BLOOD PRESSURE: 56 MMHG

## 2019-03-05 VITALS — RESPIRATION RATE: 17 BRPM | SYSTOLIC BLOOD PRESSURE: 91 MMHG | DIASTOLIC BLOOD PRESSURE: 50 MMHG | HEART RATE: 69 BPM

## 2019-03-05 VITALS — HEART RATE: 82 BPM | DIASTOLIC BLOOD PRESSURE: 56 MMHG | SYSTOLIC BLOOD PRESSURE: 91 MMHG | RESPIRATION RATE: 18 BRPM

## 2019-03-05 VITALS — SYSTOLIC BLOOD PRESSURE: 96 MMHG | DIASTOLIC BLOOD PRESSURE: 58 MMHG | RESPIRATION RATE: 18 BRPM | HEART RATE: 86 BPM

## 2019-03-05 VITALS — HEART RATE: 93 BPM

## 2019-03-05 VITALS — HEART RATE: 90 BPM

## 2019-03-05 VITALS — HEART RATE: 95 BPM

## 2019-03-05 LAB
% IRON SATURATION: 9 % SAT (ref 22–52)
ADD MAN DIFF?: NO
ANION GAP: 10 (ref 5–13)
BASOPHIL #: 0 10^3/UL (ref 0–0.1)
BASOPHILS %: 0.1 % (ref 0–2)
BLOOD UREA NITROGEN: 10 MG/DL (ref 7–20)
CALCIUM: 9 MG/DL (ref 8.4–10.2)
CARBON DIOXIDE: 23 MMOL/L (ref 21–31)
CHLORIDE: 106 MMOL/L (ref 97–110)
CREATININE: 1.27 MG/DL (ref 0.61–1.24)
EOSINOPHILS #: 0.1 10^3/UL (ref 0–0.5)
EOSINOPHILS %: 1 % (ref 0–7)
FERRITIN: 86.5 NG/ML (ref 11.1–264)
GLUCOSE: 128 MG/DL (ref 70–220)
HEMATOCRIT: 26.5 % (ref 42–52)
HEMOGLOBIN: 8.2 G/DL (ref 14–18)
IMMATURE GRANS #M: 0.03 10^3/UL (ref 0–0.03)
IMMATURE GRANS % (M): 0.3 % (ref 0–0.43)
IRON: 26 UG/DL (ref 35–150)
LYMPHOCYTES #: 1.5 10^3/UL (ref 0.8–2.9)
LYMPHOCYTES %: 16.8 % (ref 15–51)
MEAN CORPUSCULAR HEMOGLOBIN: 23.8 PG (ref 29–33)
MEAN CORPUSCULAR HGB CONC: 30.9 G/DL (ref 32–37)
MEAN CORPUSCULAR VOLUME: 77 FL (ref 82–101)
MEAN PLATELET VOLUME: 9.8 FL (ref 7.4–10.4)
MONOCYTE #: 0.6 10^3/UL (ref 0.3–0.9)
MONOCYTES %: 6.5 % (ref 0–11)
NEUTROPHIL #: 6.5 10^3/UL (ref 1.6–7.5)
NEUTROPHILS %: 75.3 % (ref 39–77)
NUCLEATED RED BLOOD CELLS #: 0 10^3/UL (ref 0–0)
NUCLEATED RED BLOOD CELLS%: 0 /100WBC (ref 0–0)
PLATELET COUNT: 333 10^3/UL (ref 140–415)
POTASSIUM: 4.3 MMOL/L (ref 3.5–5.1)
RED BLOOD COUNT: 3.44 10^6/UL (ref 4.7–6.1)
RED CELL DISTRIBUTION WIDTH: 15.9 % (ref 11.5–14.5)
SODIUM: 139 MMOL/L (ref 135–144)
TOTAL IRON BINDING CAPACITY: 278 UG/DL (ref 241–421)
WHITE BLOOD COUNT: 8.6 10^3/UL (ref 4.8–10.8)

## 2019-03-05 RX ADMIN — INSULIN ASPART 1 UNIT: 100 INJECTION, SOLUTION INTRAVENOUS; SUBCUTANEOUS at 07:59

## 2019-03-05 RX ADMIN — ZOLPIDEM TARTRATE 1 MG: 5 TABLET, FILM COATED ORAL at 00:58

## 2019-03-05 RX ADMIN — SODIUM FERRIC GLUCONATE COMPLEX 1 MLS/HR: 12.5 INJECTION INTRAVENOUS at 12:44

## 2019-03-05 RX ADMIN — HYDROMORPHONE HYDROCHLORIDE 1 MG: 1 INJECTION, SOLUTION INTRAMUSCULAR; INTRAVENOUS; SUBCUTANEOUS at 03:27

## 2019-03-05 RX ADMIN — INSULIN GLARGINE SCH UNITS: 100 INJECTION, SOLUTION SUBCUTANEOUS at 08:07

## 2019-03-05 RX ADMIN — DIPHENHYDRAMINE HYDROCHLORIDE SCH MG: 50 INJECTION, SOLUTION INTRAMUSCULAR; INTRAVENOUS at 08:04

## 2019-03-05 RX ADMIN — FAMOTIDINE 1 MG: 10 INJECTION, SOLUTION INTRAVENOUS at 08:04

## 2019-03-05 RX ADMIN — FAMOTIDINE SCH MG: 20 TABLET ORAL at 20:49

## 2019-03-05 RX ADMIN — INSULIN ASPART 1 UNIT: 100 INJECTION, SOLUTION INTRAVENOUS; SUBCUTANEOUS at 11:49

## 2019-03-05 RX ADMIN — ZOLPIDEM TARTRATE PRN MG: 5 TABLET, FILM COATED ORAL at 00:58

## 2019-03-05 RX ADMIN — FAMOTIDINE 1 MG: 20 TABLET ORAL at 20:49

## 2019-03-05 RX ADMIN — INSULIN GLARGINE 1 UNITS: 100 INJECTION, SOLUTION SUBCUTANEOUS at 08:07

## 2019-03-05 RX ADMIN — HYDROMORPHONE HYDROCHLORIDE PRN MG: 1 INJECTION, SOLUTION INTRAMUSCULAR; INTRAVENOUS; SUBCUTANEOUS at 03:27

## 2019-03-05 RX ADMIN — INSULIN ASPART 1 UNIT: 100 INJECTION, SOLUTION INTRAVENOUS; SUBCUTANEOUS at 18:03

## 2019-03-05 RX ADMIN — SODIUM FERRIC GLUCONATE COMPLEX SCH MLS/HR: 12.5 INJECTION INTRAVENOUS at 12:44

## 2019-03-05 RX ADMIN — INSULIN ASPART 1 UNIT: 100 INJECTION, SOLUTION INTRAVENOUS; SUBCUTANEOUS at 20:52

## 2019-03-05 NOTE — PN
Date/Time of Note


Date/Time of Note


DATE: 3/5/19 


TIME: 12:12





Assessment/Plan


VTE Prophylaxis


Risk score (from Ns)>0 risk:  1


SCD applied (from Ns):  No


SCD contraindicated:  other (scds)


Pharmacological prophylaxis:  other (scds)





Lines/Catheters


IV Catheter Type (from [facility]):  Peripheral IV


Urinary Cath still in place:  No





Assessment/Plan


Hospital Course


Summary Assessment and Plan:


Assessment:


Abdominal pain


Intermittent diarrhea x1 month


Abnormal imaging on noncontrast CT from [facility]


   -Wall thickening of the hepatic flexure and transverse colon findings are s


uspicious for underlying mass


   -CEA negative


   Colonoscopy 3/4/19


   Mass in the right colonproximal ascending colon


   Unable to transversepartial bowel obstruction


   Colon mass likely malignancy.  Noted in the proximal transverse colon/


   Tattoo placed for surgical evaluation/follow-up


   Colon mass bx: Adenocarcinoma, well-differentiated, with focal surface 


ulceration. 


   One fragment shows changes suggestive of residual tubulovillous adenoma with 


high grade 


Unintentional weight loss (30-40 lbs over 5 months)


Microcytic hyperchromic anemia


CAD- Plavix currently on hold


DM


Renal insufficiency


Previous abdominal surgery





Plan:


Continue clear liquid diet


Recommend surgery/Onc consult 


No further GI work-up we will sign off, but will be available upon reconsult as 


needed


Patient seen in collaboration with Dr. Argueta





Subjective:


Course reviewed with nursing staff


Patient interviewed and examined


All labs, imaging and other results reviewed





Pt without over night events


Currently no c/o abd pain, nausea o vomiting


He is tolerating clear liquid diet well


Discussed results of colonoscopy and bx results with patient





PHYSICAL EXAMINATION:


GENERAL:Alert & oriented x 3, in no acute distress


SKIN: Midline scar


EYES: Pupils equal reactive to light, no discharge.


EARS/NOSE AND THROAT: Ears normal, nose normal, oropharynx normal


NECK: Supple, no masses


CHEST: Inspection within normal limits.


CARDIOVASCULAR: Heart: Regular rate and rhythm


RESPIRATORY: Lungs clear to auscultation


GASTROINTESTINAL AND LIVER: Abdomen: Soft, generalized abd tenderness, non-


distended, no hernias, no masses, no organomegaly, no ascites, no guarding, no 


rebound tenderness, normoactive bowel sounds. Rectal: Deferred. 


EXTREMITIES: No cyanosis, clubbing or edema.


Result Diagram:  


3/5/19 0950                                                                     


          3/5/19 0950





Results 24hrs





Laboratory Tests


Test
                     3/4/19
15:11  3/4/19
17:11  3/4/19
20:32  3/5/19
03:21


Bedside Glucose                  172           153          223  H         183


Test
                     3/5/19
07:59  3/5/19
09:50  3/5/19
11:48  



Bedside Glucose                   95                         118


White Blood Count                              8.6


Red Blood Count                              3.44  L


Hemoglobin                                    8.2  L


Hematocrit                                   26.5  L


Mean Corpuscular Volume                      77.0  L


Mean Corpuscular                             23.8  L


Hemoglobin


Mean Corpuscular          
                 30.9  L
  
             



Hemoglobin
Concent


Red Cell Distribution                        15.9  H


Width


Platelet Count                                 333


Mean Platelet Volume                           9.8


Immature Granulocytes %                      0.300


Neutrophils %                                 75.3


Lymphocytes %                                 16.8


Monocytes %                                    6.5


Eosinophils %                                  1.0


Basophils %                                    0.1


Nucleated Red Blood                            0.0


Cells %


Immature Granulocytes #                      0.030


Neutrophils #                                  6.5


Lymphocytes #                                  1.5


Monocytes #                                    0.6


Eosinophils #                                  0.1


Basophils #                                    0.0


Nucleated Red Blood                            0.0


Cells #


Sodium Level                                   139


Potassium Level                                4.3


Chloride Level                                 106


Carbon Dioxide Level                            23


Anion Gap                                       10


Blood Urea Nitrogen                             10


Creatinine                                   1.27  H


Est Glomerular Filtrat    
                   58  L
  
             



Rate
mL/min


Glucose Level                                  128


Calcium Level                                  9.0


Iron Level                                     26  L


Total Iron Binding                             278


Capacity


Percent Iron Saturation                         9  L


Ferritin                                      86.5








Exam/Review of Systems


Exam


Vitals





Vital Signs


  Date      Temp  Pulse  Resp  B/P (MAP)   Pulse Ox  O2          O2 Flow    FiO2


Time                                                 Delivery    Rate


    3/5/19  98.5     88    18      103/63        98


     11:24                           (76)


    3/5/19                                           Room Air


     04:00








Intake and Output





3/4/19


3/4/19


3/5/19





1515:00


23:00


07:00





IntakeIntake Total


360 ml


320 ml





BalanceBalance


360 ml


320 ml














Results


Results 24hrs





Laboratory Tests


Test
                     3/4/19
15:11  3/4/19
17:11  3/4/19
20:32  3/5/19
03:21


Bedside Glucose                  172           153          223  H         183


Test
                     3/5/19
07:59  3/5/19
09:50  3/5/19
11:48  



Bedside Glucose                   95                         118


White Blood Count                              8.6


Red Blood Count                              3.44  L


Hemoglobin                                    8.2  L


Hematocrit                                   26.5  L


Mean Corpuscular Volume                      77.0  L


Mean Corpuscular                             23.8  L


Hemoglobin


Mean Corpuscular          
                 30.9  L
  
             



Hemoglobin
Concent


Red Cell Distribution                        15.9  H


Width


Platelet Count                                 333


Mean Platelet Volume                           9.8


Immature Granulocytes %                      0.300


Neutrophils %                                 75.3


Lymphocytes %                                 16.8


Monocytes %                                    6.5


Eosinophils %                                  1.0


Basophils %                                    0.1


Nucleated Red Blood                            0.0


Cells %


Immature Granulocytes #                      0.030


Neutrophils #                                  6.5


Lymphocytes #                                  1.5


Monocytes #                                    0.6


Eosinophils #                                  0.1


Basophils #                                    0.0


Nucleated Red Blood                            0.0


Cells #


Sodium Level                                   139


Potassium Level                                4.3


Chloride Level                                 106


Carbon Dioxide Level                            23


Anion Gap                                       10


Blood Urea Nitrogen                             10


Creatinine                                   1.27  H


Est Glomerular Filtrat    
                   58  L
  
             



Rate
mL/min


Glucose Level                                  128


Calcium Level                                  9.0


Iron Level                                     26  L


Total Iron Binding                             278


Capacity


Percent Iron Saturation                         9  L


Ferritin                                      86.5








Medications


Medication





Current Medications


IV Flush (NS 3 ml) 3 ml PER PROTOCOL IV ;  Start 3/2/19 at 00:00


Ondansetron HCl (Zofran Inj) 4 mg Q6H  PRN IV NAUSEA/VOMITING;  Start 3/2/19 at 


00:00


Acetaminophen (Tylenol Tab) 650 mg Q6H  PRN PO .PAIN 1-3 OR TEMP Last 


administered on 3/2/19at 01:03; Admin Dose 650 MG;  Start 3/2/19 at 00:00


Acetaminophen/ Hydrocodone Bitart (Norco (5/325)) 1 tab Q6H  PRN PO .PAIN 4-6;  


Start 3/2/19 at 00:00


Zolpidem Tartrate (Ambien) 10 mg QHS  PRN PO .INSOMNIA Last administered on 


3/5/19at 00:58; Admin Dose 10 MG;  Start 3/2/19 at 00:00


Famotidine (Pepcid Iv) 20 mg Q12 IV  Last administered on 3/5/19at 08:04; Admin 


Dose 20 MG;  Start 3/2/19 at 09:00


Albuterol/ Ipratropium (Duoneb) 3 ml Q2H RESP THERAPY  PRN HHN SHORTNESS OF 


BREATH;  Start 3/2/19 at 00:00


Diagnostic Test (Pha) (Accu-Chek) 1 ea 02 XX  Last administered on 3/5/19at 


03:20; Admin Dose 1 EA;  Start 3/2/19 at 02:00


Miscellaneous Information 1 ea NOTE XX ;  Start 3/2/19 at 00:00


Glucose (Glutose) 15 gm Q15M  PRN PO DECREASED GLUCOSE;  Start 3/2/19 at 00:00


Glucose (Glutose) 22.5 gm Q15M  PRN PO DECREASED GLUCOSE;  Start 3/2/19 at 00:00


Dextrose (D50w Syringe) 25 ml Q15M  PRN IV DECREASED GLUCOSE;  Start 3/2/19 at 


00:00


Dextrose (D50w Syringe) 50 ml Q15M  PRN IV DECREASED GLUCOSE;  Start 3/2/19 at 


00:00


Glucagon (Glucagen) 1 mg Q15M  PRN IM DECREASED GLUCOSE;  Start 3/2/19 at 00:00


Glucose (Glutose) 15 gm Q15M  PRN BUCCAL DECREASED GLUCOSE;  Start 3/2/19 at 


00:00


Insulin Glargine (Lantus) 15 units DAILY@0800 SC  Last administered on 3/5/19at 


08:07; Admin Dose 15 UNITS;  Start 3/2/19 at 12:00


Insulin Aspart (Novolog Insulin Pen) NOVOLOG *MODERATE* ALGORITHM WITH MEALS  


BEDTIME SC  Last administered on 3/4/19at 20:35; Admin Dose 2 UNIT;  Start 


3/3/19 at 13:00


Hydromorphone HCl (Dilaudid) 1 mg Q6H  PRN IV SEVERE PAIN LEVEL 7-10 Last 


administered on 3/5/19at 03:27; Admin Dose 1 MG;  Start 3/4/19 at 19:00


Ferric Sodium Gluconate Complex 125 mg/Sodium Chloride 110 ml @  110 mls/hr 


DAILY@1300 IVPB ;  Start 3/5/19 at 13:00;  Stop 3/9/19 at 13:59











KURT COOPER              Mar 5, 2019 12:19

## 2019-03-05 NOTE — PN
Date/Time of Note


Date/Time of Note


DATE: 3/5/19 


TIME: 14:44





Assessment/Plan


VTE Prophylaxis


Risk score (from Ns)>0 risk:  1


SCD applied (from Ns):  Yes


Pharmacological prophylaxis:  heparin





Lines/Catheters


IV Catheter Type (from Nrsg):  Peripheral IV


Urinary Cath still in place:  No





Assessment/Plan


Hospital Course


59 yo male with DM who presented with hematochezia.  Workup has revealed colon 


adenocarcinoma





Colon adenocarcinoma:


- s/p colonoscopy


- Dr Mcintyre to see patient tomorrow


- General surgery consultant pending





Iron deficiency anemia:


- IV iron





Diabetes


Sugars currently stable


A1c at 6.7


Continue Lantus and continue sliding scale


Home regimen unknown





CKD II:


- stable





Prophylaxis: SCDs





DC planning: Pending management plan of colon cancer


Result Diagram:  


3/5/19 0950                                                                     


          3/5/19 0950





Results 24hrs





Laboratory Tests


Test
                     3/4/19
15:11  3/4/19
17:11  3/4/19
20:32  3/5/19
03:21


Bedside Glucose                  172           153          223  H         183


Test
                     3/5/19
07:59  3/5/19
09:50  3/5/19
11:48  



Bedside Glucose                   95                         118


White Blood Count                              8.6


Red Blood Count                              3.44  L


Hemoglobin                                    8.2  L


Hematocrit                                   26.5  L


Mean Corpuscular Volume                      77.0  L


Mean Corpuscular                             23.8  L


Hemoglobin


Mean Corpuscular          
                 30.9  L
  
             



Hemoglobin
Concent


Red Cell Distribution                        15.9  H


Width


Platelet Count                                 333


Mean Platelet Volume                           9.8


Immature Granulocytes %                      0.300


Neutrophils %                                 75.3


Lymphocytes %                                 16.8


Monocytes %                                    6.5


Eosinophils %                                  1.0


Basophils %                                    0.1


Nucleated Red Blood                            0.0


Cells %


Immature Granulocytes #                      0.030


Neutrophils #                                  6.5


Lymphocytes #                                  1.5


Monocytes #                                    0.6


Eosinophils #                                  0.1


Basophils #                                    0.0


Nucleated Red Blood                            0.0


Cells #


Sodium Level                                   139


Potassium Level                                4.3


Chloride Level                                 106


Carbon Dioxide Level                            23


Anion Gap                                       10


Blood Urea Nitrogen                             10


Creatinine                                   1.27  H


Est Glomerular Filtrat    
                   58  L
  
             



Rate
mL/min


Glucose Level                                  128


Calcium Level                                  9.0


Iron Level                                     26  L


Total Iron Binding                             278


Capacity


Percent Iron Saturation                         9  L


Ferritin                                      86.5








Subjective


24 Hr Interval Summary


Free Text/Dictation


Discussed dx of cancer with him.  He is sad but anxious to hear of next steps


No pain





Exam/Review of Systems


Exam


Vitals





Vital Signs


  Date      Temp  Pulse  Resp  B/P (MAP)   Pulse Ox  O2          O2 Flow    FiO2


Time                                                 Delivery    Rate


    3/5/19           90


     12:01


    3/5/19  98.5           18      103/63        98


     11:24                           (76)


    3/5/19                                           Room Air


     04:00








Intake and Output





3/4/19


3/4/19


3/5/19





1515:00


23:00


07:00





IntakeIntake Total


360 ml


320 ml





BalanceBalance


360 ml


320 ml











Constitutional:  alert, oriented, well developed


Psych:  no complaints, nl mood/affect


Head:  normocephalic, atraumatic


Eyes:  nl conjunctiva, EOMI, nl lids, nl sclera, PERRL


ENMT:  nl external ears & nose, nl lips & teeth, nl nasal mucosa & septum


Neck:  supple, non-tender


Respiratory:  clear to auscultation, normal air movement


Cardiovascular:  regular rate and rhythm, nl pulses


Gastrointestinal:  soft, nl liver, spleen, non-tender


Musculoskeletal:  nl extremities to inspection, nl gait and stance


Extremities:  normal pulses


Neurological:  CNS II-XII intact, nl mental status, nl speech, nl strength


Skin:  nl turgor; 


   No rash or lesions


Lymph:  nl lymph nodes





Results


Results 24hrs





Laboratory Tests


Test
                     3/4/19
15:11  3/4/19
17:11  3/4/19
20:32  3/5/19
03:21


Bedside Glucose                  172           153          223  H         183


Test
                     3/5/19
07:59  3/5/19
09:50  3/5/19
11:48  



Bedside Glucose                   95                         118


White Blood Count                              8.6


Red Blood Count                              3.44  L


Hemoglobin                                    8.2  L


Hematocrit                                   26.5  L


Mean Corpuscular Volume                      77.0  L


Mean Corpuscular                             23.8  L


Hemoglobin


Mean Corpuscular          
                 30.9  L
  
             



Hemoglobin
Concent


Red Cell Distribution                        15.9  H


Width


Platelet Count                                 333


Mean Platelet Volume                           9.8


Immature Granulocytes %                      0.300


Neutrophils %                                 75.3


Lymphocytes %                                 16.8


Monocytes %                                    6.5


Eosinophils %                                  1.0


Basophils %                                    0.1


Nucleated Red Blood                            0.0


Cells %


Immature Granulocytes #                      0.030


Neutrophils #                                  6.5


Lymphocytes #                                  1.5


Monocytes #                                    0.6


Eosinophils #                                  0.1


Basophils #                                    0.0


Nucleated Red Blood                            0.0


Cells #


Sodium Level                                   139


Potassium Level                                4.3


Chloride Level                                 106


Carbon Dioxide Level                            23


Anion Gap                                       10


Blood Urea Nitrogen                             10


Creatinine                                   1.27  H


Est Glomerular Filtrat    
                   58  L
  
             



Rate
mL/min


Glucose Level                                  128


Calcium Level                                  9.0


Iron Level                                     26  L


Total Iron Binding                             278


Capacity


Percent Iron Saturation                         9  L


Ferritin                                      86.5








Medications


Medication





Current Medications


IV Flush (NS 3 ml) 3 ml PER PROTOCOL IV ;  Start 3/2/19 at 00:00


Ondansetron HCl (Zofran Inj) 4 mg Q6H  PRN IV NAUSEA/VOMITING;  Start 3/2/19 at 


00:00


Acetaminophen (Tylenol Tab) 650 mg Q6H  PRN PO .PAIN 1-3 OR TEMP Last 


administered on 3/2/19at 01:03; Admin Dose 650 MG;  Start 3/2/19 at 00:00


Acetaminophen/ Hydrocodone Bitart (Norco (5/325)) 1 tab Q6H  PRN PO .PAIN 4-6;  


Start 3/2/19 at 00:00


Zolpidem Tartrate (Ambien) 10 mg QHS  PRN PO .INSOMNIA Last administered on 


3/5/19at 00:58; Admin Dose 10 MG;  Start 3/2/19 at 00:00


Famotidine (Pepcid Iv) 20 mg Q12 IV  Last administered on 3/5/19at 08:04; Admin 


Dose 20 MG;  Start 3/2/19 at 09:00


Albuterol/ Ipratropium (Duoneb) 3 ml Q2H RESP THERAPY  PRN HHN SHORTNESS OF 


BREATH;  Start 3/2/19 at 00:00


Diagnostic Test (Pha) (Accu-Chek) 1 ea 02 XX  Last administered on 3/5/19at 


03:20; Admin Dose 1 EA;  Start 3/2/19 at 02:00


Miscellaneous Information 1 ea NOTE XX ;  Start 3/2/19 at 00:00


Glucose (Glutose) 15 gm Q15M  PRN PO DECREASED GLUCOSE;  Start 3/2/19 at 00:00


Glucose (Glutose) 22.5 gm Q15M  PRN PO DECREASED GLUCOSE;  Start 3/2/19 at 00:00


Dextrose (D50w Syringe) 25 ml Q15M  PRN IV DECREASED GLUCOSE;  Start 3/2/19 at 


00:00


Dextrose (D50w Syringe) 50 ml Q15M  PRN IV DECREASED GLUCOSE;  Start 3/2/19 at 


00:00


Glucagon (Glucagen) 1 mg Q15M  PRN IM DECREASED GLUCOSE;  Start 3/2/19 at 00:00


Glucose (Glutose) 15 gm Q15M  PRN BUCCAL DECREASED GLUCOSE;  Start 3/2/19 at 


00:00


Insulin Glargine (Lantus) 15 units DAILY@0800 SC  Last administered on 3/5/19at 


08:07; Admin Dose 15 UNITS;  Start 3/2/19 at 12:00


Insulin Aspart (Novolog Insulin Pen) NOVOLOG *MODERATE* ALGORITHM WITH MEALS  


BEDTIME SC  Last administered on 3/4/19at 20:35; Admin Dose 2 UNIT;  Start 


3/3/19 at 13:00


Hydromorphone HCl (Dilaudid) 1 mg Q6H  PRN IV SEVERE PAIN LEVEL 7-10 Last 


administered on 3/5/19at 03:27; Admin Dose 1 MG;  Start 3/4/19 at 19:00


Ferric Sodium Gluconate Complex 125 mg/Sodium Chloride 110 ml @  110 mls/hr 


DAILY@1300 IVPB  Last administered on 3/5/19at 12:44; Admin Dose 110 MLS/HR;  


Start 3/5/19 at 13:00;  Stop 3/9/19 at 13:59











HANNAH DOWNING MD           Mar 5, 2019 14:48

## 2019-03-06 VITALS — RESPIRATION RATE: 18 BRPM | DIASTOLIC BLOOD PRESSURE: 50 MMHG | HEART RATE: 78 BPM | SYSTOLIC BLOOD PRESSURE: 90 MMHG

## 2019-03-06 VITALS — SYSTOLIC BLOOD PRESSURE: 94 MMHG | HEART RATE: 89 BPM | DIASTOLIC BLOOD PRESSURE: 48 MMHG | RESPIRATION RATE: 18 BRPM

## 2019-03-06 VITALS — HEART RATE: 86 BPM | RESPIRATION RATE: 18 BRPM | DIASTOLIC BLOOD PRESSURE: 57 MMHG | SYSTOLIC BLOOD PRESSURE: 95 MMHG

## 2019-03-06 VITALS — SYSTOLIC BLOOD PRESSURE: 101 MMHG | RESPIRATION RATE: 18 BRPM | HEART RATE: 94 BPM | DIASTOLIC BLOOD PRESSURE: 62 MMHG

## 2019-03-06 RX ADMIN — INSULIN ASPART 1 UNIT: 100 INJECTION, SOLUTION INTRAVENOUS; SUBCUTANEOUS at 17:00

## 2019-03-06 RX ADMIN — Medication 1 MG: at 04:29

## 2019-03-06 RX ADMIN — IODIXANOL 1 ML: 320 INJECTION, SOLUTION INTRAVASCULAR at 17:23

## 2019-03-06 RX ADMIN — PROPOFOL 1: 10 INJECTION, EMULSION INTRAVENOUS at 04:27

## 2019-03-06 RX ADMIN — ZOLPIDEM TARTRATE PRN MG: 5 TABLET, FILM COATED ORAL at 02:15

## 2019-03-06 RX ADMIN — FAMOTIDINE SCH MG: 20 TABLET ORAL at 20:53

## 2019-03-06 RX ADMIN — SODIUM FERRIC GLUCONATE COMPLEX SCH MLS/HR: 12.5 INJECTION INTRAVENOUS at 13:56

## 2019-03-06 RX ADMIN — ZOLPIDEM TARTRATE 1 MG: 5 TABLET, FILM COATED ORAL at 02:15

## 2019-03-06 RX ADMIN — BARIUM SULFATE 1 ML: 21 SUSPENSION ORAL at 16:00

## 2019-03-06 RX ADMIN — FAMOTIDINE 1 MG: 20 TABLET ORAL at 20:53

## 2019-03-06 RX ADMIN — Medication 1 MCG: at 04:29

## 2019-03-06 RX ADMIN — VASOPRESSIN 1 ML/S: 20 INJECTION, SOLUTION INTRAMUSCULAR; SUBCUTANEOUS at 17:23

## 2019-03-06 RX ADMIN — INSULIN ASPART 1 UNIT: 100 INJECTION, SOLUTION INTRAVENOUS; SUBCUTANEOUS at 13:22

## 2019-03-06 RX ADMIN — LIDOCAINE HYDROCHLORIDE 1 MG: 20 INJECTION, SOLUTION INTRAVENOUS at 04:28

## 2019-03-06 RX ADMIN — SODIUM FERRIC GLUCONATE COMPLEX 1 MLS/HR: 12.5 INJECTION INTRAVENOUS at 13:56

## 2019-03-06 RX ADMIN — INSULIN ASPART 1 UNIT: 100 INJECTION, SOLUTION INTRAVENOUS; SUBCUTANEOUS at 08:09

## 2019-03-06 RX ADMIN — INSULIN ASPART 1 UNIT: 100 INJECTION, SOLUTION INTRAVENOUS; SUBCUTANEOUS at 20:57

## 2019-03-06 RX ADMIN — FAMOTIDINE SCH MG: 20 TABLET ORAL at 08:07

## 2019-03-06 RX ADMIN — INSULIN ASPART 1 UNIT: 100 INJECTION, SOLUTION INTRAVENOUS; SUBCUTANEOUS at 04:28

## 2019-03-06 RX ADMIN — INSULIN ASPART 1 UNIT: 100 INJECTION, SOLUTION INTRAVENOUS; SUBCUTANEOUS at 19:33

## 2019-03-06 RX ADMIN — FAMOTIDINE 1 MG: 20 TABLET ORAL at 08:07

## 2019-03-06 RX ADMIN — INSULIN GLARGINE SCH UNITS: 100 INJECTION, SOLUTION SUBCUTANEOUS at 08:40

## 2019-03-06 RX ADMIN — INSULIN GLARGINE 1 UNITS: 100 INJECTION, SOLUTION SUBCUTANEOUS at 08:40

## 2019-03-06 RX ADMIN — SODIUM CHLORIDE 1 ML: 9 INJECTION, SOLUTION INTRAMUSCULAR; INTRAVENOUS; SUBCUTANEOUS at 17:23

## 2019-03-06 NOTE — CONS
Assessment/Plan


Assessment/Plan


Hospital Course (Demo Recall)


57 yo with newly diagnosed colon adenoca


-needs staging with CT CAP


-if no evidence of distant disease, pt can proceed with surgery. he has been see


by Dr Bullard


-cont IV iron for iron deficiency anemia





Consultation Date/Type/Reason


Admit Date/Time


Mar 1, 2019 at 20:38


Date/Time of Note


DATE: 3/6/19 


TIME: 12:36





Hx of Present Illness


57 yo admitted with history of diabetes presents with abdominal pain, diarrhea, 


rectal bleeding presented to an outside hospital with CT showing possible colon 


lesion/mass. Imaging from [place] was without contrast:


-Wall thickening of the hepatic flexure and transverse colon findings are 


suspicious for underlying mass


CEA is not elevated


He underwent colonoscopy here revealing:


Adenocarcinoma, well-differentiated, with focal surface ulceration. 





Pt has not had colonoscopy in past





Currently denies any N/V/distension/abdo pain


Eyes:  no complaints


ENT:  no complaints


Respiratory:  no complaints


Cardiovascular:  no complaints


Gastrointestinal:  no complaints


Genitourinary:  no complaints


Musculoskeletal:  no complaints


Skin:  no complaints


Neurologic:  no complaints


Endocrine:  no complaints





Past Medical History


Medications





Current Medications


IV Flush (NS 3 ml) 3 ml PER PROTOCOL IV ;  Start 3/2/19 at 00:00


Ondansetron HCl (Zofran Inj) 4 mg Q6H  PRN IV NAUSEA/VOMITING;  Start 3/2/19 at 


00:00


Acetaminophen (Tylenol Tab) 650 mg Q6H  PRN PO .PAIN 1-3 OR TEMP Last administe


red on 3/2/19at 01:03; Admin Dose 650 MG;  Start 3/2/19 at 00:00


Acetaminophen/ Hydrocodone Bitart (Norco (5/325)) 1 tab Q6H  PRN PO .PAIN 4-6;  


Start 3/2/19 at 00:00


Zolpidem Tartrate (Ambien) 10 mg QHS  PRN PO .INSOMNIA Last administered on 


3/6/19at 02:15; Admin Dose 10 MG;  Start 3/2/19 at 00:00


Albuterol/ Ipratropium (Duoneb) 3 ml Q2H RESP THERAPY  PRN HHN SHORTNESS OF 


BREATH;  Start 3/2/19 at 00:00


Diagnostic Test (Pha) (Accu-Chek) 1 ea 02 XX  Last administered on 3/5/19at 


03:20; Admin Dose 1 EA;  Start 3/2/19 at 02:00


Miscellaneous Information 1 ea NOTE XX ;  Start 3/2/19 at 00:00


Glucose (Glutose) 15 gm Q15M  PRN PO DECREASED GLUCOSE;  Start 3/2/19 at 00:00


Glucose (Glutose) 22.5 gm Q15M  PRN PO DECREASED GLUCOSE;  Start 3/2/19 at 00:00


Dextrose (D50w Syringe) 25 ml Q15M  PRN IV DECREASED GLUCOSE;  Start 3/2/19 at 


00:00


Dextrose (D50w Syringe) 50 ml Q15M  PRN IV DECREASED GLUCOSE;  Start 3/2/19 at 


00:00


Glucagon (Glucagen) 1 mg Q15M  PRN IM DECREASED GLUCOSE;  Start 3/2/19 at 00:00


Glucose (Glutose) 15 gm Q15M  PRN BUCCAL DECREASED GLUCOSE;  Start 3/2/19 at 


00:00


Insulin Aspart (Novolog Insulin Pen) NOVOLOG *MODERATE* ALGORITHM WITH MEALS  


BEDTIME SC  Last administered on 3/6/19at 08:09; Admin Dose 4 UNIT;  Start 


3/3/19 at 13:00


Hydromorphone HCl (Dilaudid) 1 mg Q6H  PRN IV SEVERE PAIN LEVEL 7-10 Last 


administered on 3/5/19at 03:27; Admin Dose 1 MG;  Start 3/4/19 at 19:00


Ferric Sodium Gluconate Complex 125 mg/Sodium Chloride 110 ml @  110 mls/hr 


DAILY@1300 IVPB  Last administered on 3/5/19at 12:44; Admin Dose 110 MLS/HR;  


Start 3/5/19 at 13:00;  Stop 3/9/19 at 13:59


Famotidine (Pepcid) 20 mg BID PO  Last administered on 3/6/19at 08:07; Admin 


Dose 20 MG;  Start 3/5/19 at 21:00


Insulin Glargine (Lantus) 20 units DAILY@0800 SC  Last administered on 3/6/19at 


08:40; Admin Dose 20 UNITS;  Start 3/6/19 at 08:00


Allergies:  


Coded Allergies:  


     No Known Allergy (Unverified , 3/1/19)





Social History


Smoking Status:  Current every day smoker





Exam/Review of Systems


Exam


Vitals





Vital Signs


  Date      Temp  Pulse  Resp  B/P (MAP)   Pulse Ox  O2          O2 Flow    FiO2


Time                                                 Delivery    Rate


    3/6/19  98.4     78    18  90/50 (63)        98  Room Air


     07:35








Intake and Output





3/5/19


3/5/19


3/6/19





1515:00


23:00


07:00





IntakeIntake Total


110 ml


1140 ml





BalanceBalance


110 ml


1140 ml











Constitutional:  alert, oriented, frail


Psych:  no complaints, nl mood/affect


Head:  normocephalic, atraumatic


Respiratory:  clear to auscultation, normal air movement


Cardiovascular:  regular rate and rhythm, nl pulses


Gastrointestinal:  soft, nl liver, spleen, non-tender


Musculoskeletal:  nl extremities to inspection, nl gait and stance


Neurological:  CNS II-XII intact, nl mental status, nl speech, nl strength





Results


Result Diagram:  


3/5/19 0950                                                                     


          3/5/19 0950





Results 24hrs





Laboratory Tests


    Test
            3/5/19
17:29  3/5/19
20:48  3/6/19
02:12  3/6/19
08:06


    Bedside Glucose         160          280  H        234  H         195








Medications


Medication





Current Medications


IV Flush (NS 3 ml) 3 ml PER PROTOCOL IV ;  Start 3/2/19 at 00:00


Ondansetron HCl (Zofran Inj) 4 mg Q6H  PRN IV NAUSEA/VOMITING;  Start 3/2/19 at 


00:00


Acetaminophen (Tylenol Tab) 650 mg Q6H  PRN PO .PAIN 1-3 OR TEMP Last 


administered on 3/2/19at 01:03; Admin Dose 650 MG;  Start 3/2/19 at 00:00


Acetaminophen/ Hydrocodone Bitart (Norco (5/325)) 1 tab Q6H  PRN PO .PAIN 4-6;  


Start 3/2/19 at 00:00


Zolpidem Tartrate (Ambien) 10 mg QHS  PRN PO .INSOMNIA Last administered on 


3/6/19at 02:15; Admin Dose 10 MG;  Start 3/2/19 at 00:00


Albuterol/ Ipratropium (Duoneb) 3 ml Q2H RESP THERAPY  PRN HHN SHORTNESS OF 


BREATH;  Start 3/2/19 at 00:00


Diagnostic Test (Pha) (Accu-Chek) 1 ea 02 XX  Last administered on 3/5/19at 


03:20; Admin Dose 1 EA;  Start 3/2/19 at 02:00


Miscellaneous Information 1 ea NOTE XX ;  Start 3/2/19 at 00:00


Glucose (Glutose) 15 gm Q15M  PRN PO DECREASED GLUCOSE;  Start 3/2/19 at 00:00


Glucose (Glutose) 22.5 gm Q15M  PRN PO DECREASED GLUCOSE;  Start 3/2/19 at 00:00


Dextrose (D50w Syringe) 25 ml Q15M  PRN IV DECREASED GLUCOSE;  Start 3/2/19 at 


00:00


Dextrose (D50w Syringe) 50 ml Q15M  PRN IV DECREASED GLUCOSE;  Start 3/2/19 at 


00:00


Glucagon (Glucagen) 1 mg Q15M  PRN IM DECREASED GLUCOSE;  Start 3/2/19 at 00:00


Glucose (Glutose) 15 gm Q15M  PRN BUCCAL DECREASED GLUCOSE;  Start 3/2/19 at 


00:00


Insulin Aspart (Novolog Insulin Pen) NOVOLOG *MODERATE* ALGORITHM WITH MEALS  


BEDTIME SC  Last administered on 3/6/19at 08:09; Admin Dose 4 UNIT;  Start 


3/3/19 at 13:00


Hydromorphone HCl (Dilaudid) 1 mg Q6H  PRN IV SEVERE PAIN LEVEL 7-10 Last 


administered on 3/5/19at 03:27; Admin Dose 1 MG;  Start 3/4/19 at 19:00


Ferric Sodium Gluconate Complex 125 mg/Sodium Chloride 110 ml @  110 mls/hr 


DAILY@1300 IVPB  Last administered on 3/5/19at 12:44; Admin Dose 110 MLS/HR;  


Start 3/5/19 at 13:00;  Stop 3/9/19 at 13:59


Famotidine (Pepcid) 20 mg BID PO  Last administered on 3/6/19at 08:07; Admin 


Dose 20 MG;  Start 3/5/19 at 21:00


Insulin Glargine (Lantus) 20 units DAILY@0800 SC  Last administered on 3/6/19at 


08:40; Admin Dose 20 UNITS;  Start 3/6/19 at 08:00











LUÍS LOZANO                 Mar 6, 2019 12:36

## 2019-03-06 NOTE — CONS
Assessment/Plan


Assessment/Plan


Assessment/Plan (Daily)


58-year-old male with lower GI heme positive stool and now diagnosed with mass 


in the right colon ascending colon versus hepatic flexure with biopsy-proven 


adenocarcinoma.





Patient will need right hemicolectomy.  We will discuss tomorrow with his 


daughters present for a better understanding of his past surgical history and to


coordinate timing of colon resection.





Consultation Date/Type/Reason


Admit Date/Time


Mar 1, 2019 at 20:38


Date of Consultation:  Mar 6, 2019


Type of Consult


General surgery consult


Reason for Consultation


Right-sided colonic mass


Requesting Provider:  HANNAH DOWNING MD


Date/Time of Note


DATE: 3/6/19 


TIME: 02:24





Hx of Present Illness


Patient 58-year-old male who was admitted to hospital with bleeding per rectum. 


Patient has had past laparotomy details are unknown due to language difficulty. 


Patient was noted on CT to have suspicion of mass in the right; and underwent 


colonoscopy 2 days ago which showed a mass in the ascending colon with biopsy 


proven adenocarcinoma





Currently the patient is stable in no acute distress mild tenderness in right 


side abdomen he is passing gas





Past Medical History


Medications





Current Medications


IV Flush (NS 3 ml) 3 ml PER PROTOCOL IV ;  Start 3/2/19 at 00:00


Ondansetron HCl (Zofran Inj) 4 mg Q6H  PRN IV NAUSEA/VOMITING;  Start 3/2/19 at 


00:00


Acetaminophen (Tylenol Tab) 650 mg Q6H  PRN PO .PAIN 1-3 OR TEMP Last 


administered on 3/2/19at 01:03; Admin Dose 650 MG;  Start 3/2/19 at 00:00


Acetaminophen/ Hydrocodone Bitart (Norco (5/325)) 1 tab Q6H  PRN PO .PAIN 4-6;  


Start 3/2/19 at 00:00


Zolpidem Tartrate (Ambien) 10 mg QHS  PRN PO .INSOMNIA Last administered on 


3/6/19at 02:15; Admin Dose 10 MG;  Start 3/2/19 at 00:00


Albuterol/ Ipratropium (Duoneb) 3 ml Q2H RESP THERAPY  PRN HHN SHORTNESS OF 


BREATH;  Start 3/2/19 at 00:00


Diagnostic Test (Pha) (Accu-Chek) 1 ea 02 XX  Last administered on 3/5/19at 


03:20; Admin Dose 1 EA;  Start 3/2/19 at 02:00


Miscellaneous Information 1 ea NOTE XX ;  Start 3/2/19 at 00:00


Glucose (Glutose) 15 gm Q15M  PRN PO DECREASED GLUCOSE;  Start 3/2/19 at 00:00


Glucose (Glutose) 22.5 gm Q15M  PRN PO DECREASED GLUCOSE;  Start 3/2/19 at 00:00


Dextrose (D50w Syringe) 25 ml Q15M  PRN IV DECREASED GLUCOSE;  Start 3/2/19 at 


00:00


Dextrose (D50w Syringe) 50 ml Q15M  PRN IV DECREASED GLUCOSE;  Start 3/2/19 at 


00:00


Glucagon (Glucagen) 1 mg Q15M  PRN IM DECREASED GLUCOSE;  Start 3/2/19 at 00:00


Glucose (Glutose) 15 gm Q15M  PRN BUCCAL DECREASED GLUCOSE;  Start 3/2/19 at 00


:00


Insulin Glargine (Lantus) 15 units DAILY@0800 SC  Last administered on 3/5/19at 


08:07; Admin Dose 15 UNITS;  Start 3/2/19 at 12:00


Insulin Aspart (Novolog Insulin Pen) NOVOLOG *MODERATE* ALGORITHM WITH MEALS  


BEDTIME SC  Last administered on 3/5/19at 20:52; Admin Dose 3 UNIT;  Start 


3/3/19 at 13:00


Hydromorphone HCl (Dilaudid) 1 mg Q6H  PRN IV SEVERE PAIN LEVEL 7-10 Last 


administered on 3/5/19at 03:27; Admin Dose 1 MG;  Start 3/4/19 at 19:00


Ferric Sodium Gluconate Complex 125 mg/Sodium Chloride 110 ml @  110 mls/hr 


DAILY@1300 IVPB  Last administered on 3/5/19at 12:44; Admin Dose 110 MLS/HR;  


Start 3/5/19 at 13:00;  Stop 3/9/19 at 13:59


Famotidine (Pepcid) 20 mg BID PO  Last administered on 3/5/19at 20:49; Admin 


Dose 20 MG;  Start 3/5/19 at 21:00


Allergies:  


Coded Allergies:  


     No Known Allergy (Unverified , 3/1/19)





Social History


Smoking Status:  Current every day smoker





Exam/Review of Systems


Exam


Vitals





Vital Signs


  Date      Temp  Pulse  Resp  B/P (MAP)   Pulse Ox  O2          O2 Flow    FiO2


Time                                                 Delivery    Rate


    3/5/19  97.8     70    18  97/56 (70        98


     20:15


    3/5/19                                           Room Air


     04:00








Intake and Output





3/5/19


3/5/19


3/6/19





1515:00


23:00


07:00





IntakeIntake Total


110 ml


1140 ml





BalanceBalance


110 ml


1140 ml











Exam


Awake and alert difficulty conversing is patient's English is significantly 


limited.





Denies acute abdominal pain.





HEENT pupils equal react light sclerae anicteric.





Lungs clear to auscultation.





Heart regular rate and rhythm without gallops murmurs or rubs normal S1-S2.





Abdomen well-healed midline incision without palpable masses nondistended flat 


abdomen





Results


Result Diagram:  


3/5/19 0950                                                                     


          3/5/19 0950





Results 24hrs





Laboratory Tests


Test
                     3/5/19
03:21  3/5/19
07:59  3/5/19
09:50  3/5/19
11:48


Bedside Glucose                  183            95                         118


White Blood Count                                            8.6


Red Blood Count                                            3.44  L


Hemoglobin                                                  8.2  L


Hematocrit                                                 26.5  L


Mean Corpuscular Volume                                    77.0  L


Mean Corpuscular                                           23.8  L


Hemoglobin


Mean Corpuscular          
             
                 30.9  L
  



Hemoglobin
Concent


Red Cell Distribution                                      15.9  H


Width


Platelet Count                                               333


Mean Platelet Volume                                         9.8


Immature Granulocytes %                                    0.300


Neutrophils %                                               75.3


Lymphocytes %                                               16.8


Monocytes %                                                  6.5


Eosinophils %                                                1.0


Basophils %                                                  0.1


Nucleated Red Blood                                          0.0


Cells %


Immature Granulocytes #                                    0.030


Neutrophils #                                                6.5


Lymphocytes #                                                1.5


Monocytes #                                                  0.6


Eosinophils #                                                0.1


Basophils #                                                  0.0


Nucleated Red Blood                                          0.0


Cells #


Sodium Level                                                 139


Potassium Level                                              4.3


Chloride Level                                               106


Carbon Dioxide Level                                          23


Anion Gap                                                     10


Blood Urea Nitrogen                                           10


Creatinine                                                 1.27  H


Est Glomerular Filtrat    
             
                   58  L
  



Rate
mL/min


Glucose Level                                                128


Calcium Level                                                9.0


Iron Level                                                   26  L


Total Iron Binding                                           278


Capacity


Percent Iron Saturation                                       9  L


Ferritin                                                    86.5


Test
                     3/5/19
17:29  3/5/19
20:48  
             



Bedside Glucose                  160          280  H








Medications


Medication





Current Medications


IV Flush (NS 3 ml) 3 ml PER PROTOCOL IV ;  Start 3/2/19 at 00:00


Ondansetron HCl (Zofran Inj) 4 mg Q6H  PRN IV NAUSEA/VOMITING;  Start 3/2/19 at 


00:00


Acetaminophen (Tylenol Tab) 650 mg Q6H  PRN PO .PAIN 1-3 OR TEMP Last 


administered on 3/2/19at 01:03; Admin Dose 650 MG;  Start 3/2/19 at 00:00


Acetaminophen/ Hydrocodone Bitart (Norco (5/325)) 1 tab Q6H  PRN PO .PAIN 4-6;  


Start 3/2/19 at 00:00


Zolpidem Tartrate (Ambien) 10 mg QHS  PRN PO .INSOMNIA Last administered on 


3/6/19at 02:15; Admin Dose 10 MG;  Start 3/2/19 at 00:00


Albuterol/ Ipratropium (Duoneb) 3 ml Q2H RESP THERAPY  PRN HHN SHORTNESS OF B


REATH;  Start 3/2/19 at 00:00


Diagnostic Test (Pha) (Accu-Chek) 1 ea 02 XX  Last administered on 3/5/19at 


03:20; Admin Dose 1 EA;  Start 3/2/19 at 02:00


Miscellaneous Information 1 ea NOTE XX ;  Start 3/2/19 at 00:00


Glucose (Glutose) 15 gm Q15M  PRN PO DECREASED GLUCOSE;  Start 3/2/19 at 00:00


Glucose (Glutose) 22.5 gm Q15M  PRN PO DECREASED GLUCOSE;  Start 3/2/19 at 00:00


Dextrose (D50w Syringe) 25 ml Q15M  PRN IV DECREASED GLUCOSE;  Start 3/2/19 at 


00:00


Dextrose (D50w Syringe) 50 ml Q15M  PRN IV DECREASED GLUCOSE;  Start 3/2/19 at 


00:00


Glucagon (Glucagen) 1 mg Q15M  PRN IM DECREASED GLUCOSE;  Start 3/2/19 at 00:00


Glucose (Glutose) 15 gm Q15M  PRN BUCCAL DECREASED GLUCOSE;  Start 3/2/19 at 


00:00


Insulin Glargine (Lantus) 15 units DAILY@0800 SC  Last administered on 3/5/19at 


08:07; Admin Dose 15 UNITS;  Start 3/2/19 at 12:00


Insulin Aspart (Novolog Insulin Pen) NOVOLOG *MODERATE* ALGORITHM WITH MEALS  


BEDTIME SC  Last administered on 3/5/19at 20:52; Admin Dose 3 UNIT;  Start 


3/3/19 at 13:00


Hydromorphone HCl (Dilaudid) 1 mg Q6H  PRN IV SEVERE PAIN LEVEL 7-10 Last 


administered on 3/5/19at 03:27; Admin Dose 1 MG;  Start 3/4/19 at 19:00


Ferric Sodium Gluconate Complex 125 mg/Sodium Chloride 110 ml @  110 mls/hr 


DAILY@1300 IVPB  Last administered on 3/5/19at 12:44; Admin Dose 110 MLS/HR;  


Start 3/5/19 at 13:00;  Stop 3/9/19 at 13:59


Famotidine (Pepcid) 20 mg BID PO  Last administered on 3/5/19at 20:49; Admin 


Dose 20 MG;  Start 3/5/19 at 21:00











HELLEN HUNG MD              Mar 6, 2019 02:27

## 2019-03-06 NOTE — PN
Date/Time of Note


Date/Time of Note


DATE: 3/6/19 


TIME: 13:02





Assessment/Plan


VTE Prophylaxis


Risk score (from Ns)>0 risk:  1


SCD applied (from Ns):  Yes


Pharmacological prophylaxis:  heparin





Lines/Catheters


IV Catheter Type (from Nrsg):  Peripheral IV


Urinary Cath still in place:  No





Assessment/Plan


Hospital Course


59 yo male with DM who presented with hematochezia.  Workup has revealed colon 


adenocarcinoma





Colon adenocarcinoma:


- s/p colonoscopy


- Dr Mcintyre to see patient today


- General surgery Dr Bullard for hemicolectomy





Iron deficiency anemia:


- IV iron





Diabetes


Sugars currently stable


A1c at 6.7


Continue Lantus and continue sliding scale


Home regimen unknown





CKD II:


- stable





Prophylaxis: SCDs





DC planning: Pending management plan of colon cancer


Result Diagram:  


3/5/19 0950                                                                     


          3/5/19 0950





Results 24hrs





Laboratory Tests


    Test
            3/5/19
17:29  3/5/19
20:48  3/6/19
02:12  3/6/19
08:06


    Bedside Glucose         160          280  H        234  H         195








Subjective


24 Hr Interval Summary


Free Text/Dictation


No change to clinical status


Has many questions regarding cancer care that I am unable to answer.  Hugh Bullard


and Francisco Javier on board





Exam/Review of Systems


Exam


Vitals





Vital Signs


  Date      Temp  Pulse  Resp  B/P (MAP)   Pulse Ox  O2          O2 Flow    FiO2


Time                                                 Delivery    Rate


    3/6/19  98.4     78    18  90/50 (63)        98  Room Air


     07:35








Intake and Output





3/5/19


3/5/19


3/6/19





1515:00


23:00


07:00





IntakeIntake Total


110 ml


1140 ml





BalanceBalance


110 ml


1140 ml











Constitutional:  alert, oriented, well developed


Psych:  no complaints, nl mood/affect


Head:  normocephalic, atraumatic


Eyes:  nl conjunctiva, EOMI, nl lids, nl sclera, PERRL


ENMT:  nl external ears & nose, nl lips & teeth, nl nasal mucosa & septum


Neck:  supple, non-tender


Respiratory:  clear to auscultation, normal air movement


Cardiovascular:  regular rate and rhythm, nl pulses


Gastrointestinal:  soft, nl liver, spleen, non-tender


Musculoskeletal:  nl extremities to inspection, nl gait and stance


Extremities:  normal pulses


Neurological:  CNS II-XII intact, nl mental status, nl speech, nl strength


Skin:  nl turgor; 


   No rash or lesions


Lymph:  nl lymph nodes





Results


Results 24hrs





Laboratory Tests


    Test
            3/5/19
17:29  3/5/19
20:48  3/6/19
02:12  3/6/19
08:06


    Bedside Glucose         160          280  H        234  H         195








Medications


Medication





Current Medications


IV Flush (NS 3 ml) 3 ml PER PROTOCOL IV ;  Start 3/2/19 at 00:00


Ondansetron HCl (Zofran Inj) 4 mg Q6H  PRN IV NAUSEA/VOMITING;  Start 3/2/19 at 


00:00


Acetaminophen (Tylenol Tab) 650 mg Q6H  PRN PO .PAIN 1-3 OR TEMP Last 


administered on 3/2/19at 01:03; Admin Dose 650 MG;  Start 3/2/19 at 00:00


Acetaminophen/ Hydrocodone Bitart (Norco (5/325)) 1 tab Q6H  PRN PO .PAIN 4-6;  


Start 3/2/19 at 00:00


Zolpidem Tartrate (Ambien) 10 mg QHS  PRN PO .INSOMNIA Last administered on 


3/6/19at 02:15; Admin Dose 10 MG;  Start 3/2/19 at 00:00


Albuterol/ Ipratropium (Duoneb) 3 ml Q2H RESP THERAPY  PRN HHN SHORTNESS OF 


BREATH;  Start 3/2/19 at 00:00


Diagnostic Test (Pha) (Accu-Chek) 1 ea 02 XX  Last administered on 3/5/19at 


03:20; Admin Dose 1 EA;  Start 3/2/19 at 02:00


Miscellaneous Information 1 ea NOTE XX ;  Start 3/2/19 at 00:00


Glucose (Glutose) 15 gm Q15M  PRN PO DECREASED GLUCOSE;  Start 3/2/19 at 00:00


Glucose (Glutose) 22.5 gm Q15M  PRN PO DECREASED GLUCOSE;  Start 3/2/19 at 00:00


Dextrose (D50w Syringe) 25 ml Q15M  PRN IV DECREASED GLUCOSE;  Start 3/2/19 at 


00:00


Dextrose (D50w Syringe) 50 ml Q15M  PRN IV DECREASED GLUCOSE;  Start 3/2/19 at 


00:00


Glucagon (Glucagen) 1 mg Q15M  PRN IM DECREASED GLUCOSE;  Start 3/2/19 at 00:00


Glucose (Glutose) 15 gm Q15M  PRN BUCCAL DECREASED GLUCOSE;  Start 3/2/19 at 


00:00


Insulin Aspart (Novolog Insulin Pen) NOVOLOG *MODERATE* ALGORITHM WITH MEALS  


BEDTIME SC  Last administered on 3/6/19at 08:09; Admin Dose 4 UNIT;  Start 


3/3/19 at 13:00


Hydromorphone HCl (Dilaudid) 1 mg Q6H  PRN IV SEVERE PAIN LEVEL 7-10 Last 


administered on 3/5/19at 03:27; Admin Dose 1 MG;  Start 3/4/19 at 19:00


Ferric Sodium Gluconate Complex 125 mg/Sodium Chloride 110 ml @  110 mls/hr 


DAILY@1300 IVPB  Last administered on 3/5/19at 12:44; Admin Dose 110 MLS/HR;  


Start 3/5/19 at 13:00;  Stop 3/9/19 at 13:59


Famotidine (Pepcid) 20 mg BID PO  Last administered on 3/6/19at 08:07; Admin 


Dose 20 MG;  Start 3/5/19 at 21:00


Insulin Glargine (Lantus) 20 units DAILY@0800 SC  Last administered on 3/6/19at 


08:40; Admin Dose 20 UNITS;  Start 3/6/19 at 08:00


Barium Sulfate (Readi-Cat 2 ( [employer] )) 900 ml GIVE PRIOR TO CT ONCE PO 


;  Start 3/6/19 at 14:00;  Stop 3/6/19 at 14:01











HANNAH DOWNING MD           Mar 6, 2019 13:03

## 2019-03-07 VITALS — RESPIRATION RATE: 18 BRPM | HEART RATE: 57 BPM | DIASTOLIC BLOOD PRESSURE: 60 MMHG | SYSTOLIC BLOOD PRESSURE: 96 MMHG

## 2019-03-07 VITALS — DIASTOLIC BLOOD PRESSURE: 58 MMHG | HEART RATE: 78 BPM | RESPIRATION RATE: 18 BRPM | SYSTOLIC BLOOD PRESSURE: 99 MMHG

## 2019-03-07 VITALS — HEART RATE: 90 BPM | DIASTOLIC BLOOD PRESSURE: 62 MMHG | SYSTOLIC BLOOD PRESSURE: 108 MMHG | RESPIRATION RATE: 16 BRPM

## 2019-03-07 VITALS — DIASTOLIC BLOOD PRESSURE: 52 MMHG | RESPIRATION RATE: 16 BRPM | SYSTOLIC BLOOD PRESSURE: 93 MMHG | HEART RATE: 79 BPM

## 2019-03-07 RX ADMIN — FAMOTIDINE SCH MG: 20 TABLET ORAL at 20:35

## 2019-03-07 RX ADMIN — FAMOTIDINE 1 MG: 20 TABLET ORAL at 20:35

## 2019-03-07 RX ADMIN — INSULIN ASPART 1 UNIT: 100 INJECTION, SOLUTION INTRAVENOUS; SUBCUTANEOUS at 17:41

## 2019-03-07 RX ADMIN — ZOLPIDEM TARTRATE PRN MG: 5 TABLET, FILM COATED ORAL at 02:14

## 2019-03-07 RX ADMIN — SODIUM CHLORIDE 1 ML: 9 INJECTION, SOLUTION INTRAMUSCULAR; INTRAVENOUS; SUBCUTANEOUS at 11:02

## 2019-03-07 RX ADMIN — INSULIN ASPART 1 UNIT: 100 INJECTION, SOLUTION INTRAVENOUS; SUBCUTANEOUS at 17:42

## 2019-03-07 RX ADMIN — SODIUM FERRIC GLUCONATE COMPLEX 1 MLS/HR: 12.5 INJECTION INTRAVENOUS at 12:22

## 2019-03-07 RX ADMIN — ZOLPIDEM TARTRATE 1 MG: 5 TABLET, FILM COATED ORAL at 23:57

## 2019-03-07 RX ADMIN — FAMOTIDINE SCH MG: 20 TABLET ORAL at 08:22

## 2019-03-07 RX ADMIN — VASOPRESSIN 1 ML/10 S: 20 INJECTION, SOLUTION INTRAMUSCULAR; SUBCUTANEOUS at 11:02

## 2019-03-07 RX ADMIN — INSULIN GLARGINE 1 UNITS: 100 INJECTION, SOLUTION SUBCUTANEOUS at 08:26

## 2019-03-07 RX ADMIN — INSULIN ASPART 1 UNIT: 100 INJECTION, SOLUTION INTRAVENOUS; SUBCUTANEOUS at 13:19

## 2019-03-07 RX ADMIN — SODIUM FERRIC GLUCONATE COMPLEX SCH MLS/HR: 12.5 INJECTION INTRAVENOUS at 12:22

## 2019-03-07 RX ADMIN — IODIXANOL 1 ML: 320 INJECTION, SOLUTION INTRAVASCULAR at 11:02

## 2019-03-07 RX ADMIN — INSULIN ASPART 1 UNIT: 100 INJECTION, SOLUTION INTRAVENOUS; SUBCUTANEOUS at 20:37

## 2019-03-07 RX ADMIN — ZOLPIDEM TARTRATE PRN MG: 5 TABLET, FILM COATED ORAL at 23:57

## 2019-03-07 RX ADMIN — INSULIN ASPART 1 UNIT: 100 INJECTION, SOLUTION INTRAVENOUS; SUBCUTANEOUS at 08:25

## 2019-03-07 RX ADMIN — ZOLPIDEM TARTRATE 1 MG: 5 TABLET, FILM COATED ORAL at 02:14

## 2019-03-07 RX ADMIN — FAMOTIDINE 1 MG: 20 TABLET ORAL at 08:22

## 2019-03-07 RX ADMIN — INSULIN GLARGINE SCH UNITS: 100 INJECTION, SOLUTION SUBCUTANEOUS at 08:26

## 2019-03-07 NOTE — PN
Date/Time of Note


Date/Time of Note


DATE: 3/7/19 


TIME: 13:41





Assessment/Plan


VTE Prophylaxis


Risk score (from Nsg)>0 risk:  1


SCD applied (from Nsg):  Yes


Pharmacological prophylaxis:  heparin





Lines/Catheters


IV Catheter Type (from Nrsg):  Peripheral IV


Urinary Cath still in place:  No





Assessment/Plan


Hospital Course


59 yo male with DM who presented with hematochezia.  Workup has revealed colon 


adenocarcinoma





Colon adenocarcinoma:


- Bone scan to evaluate bone lesion on CT


- s/p colonoscopy


- Dr Mcintyre to see patient today


- General surgery Dr Bullard for hemicolectomy





Iron deficiency anemia:


- IV iron





Diabetes


Sugars currently stable


A1c at 6.7


Continue Lantus and continue sliding scale


Home regimen unknown





CKD II:


- stable





Prophylaxis: SCDs





DC planning: Pending management plan of colon cancer


Result Diagram:  


3/5/19 0950                                                                     


          3/5/19 0950





Results 24hrs





Laboratory Tests


    Test
            3/6/19
17:29  3/6/19
20:53  3/7/19
02:16  3/7/19
08:22


    Bedside Glucose         111          238  H         177           193


    Test
            3/7/19
13:12  
             
             



    Bedside Glucose        389  H








Subjective


24 Hr Interval Summary


Free Text/Dictation


CT results reviewed, local colon lesion, possible bone lesion


Patient no symptoms.  Frustrated to still be hospitalized without plan





Exam/Review of Systems


Exam


Vitals





Vital Signs


  Date      Temp  Pulse  Resp  B/P (MAP)   Pulse Ox  O2          O2 Flow    FiO2


Time                                                 Delivery    Rate


    3/7/19  98.4     57    18  96/60 (72)        97


     07:36


    3/6/19                                           Room Air


     14:14








Intake and Output





3/6/19


3/6/19


3/7/19





1515:00


23:00


07:00





IntakeIntake Total


470 ml


100 ml





BalanceBalance


470 ml


100 ml











Constitutional:  alert, oriented, well developed


Psych:  no complaints, nl mood/affect


Head:  normocephalic, atraumatic


Eyes:  nl conjunctiva, EOMI, nl lids, nl sclera, PERRL


ENMT:  nl external ears & nose, nl lips & teeth, nl nasal mucosa & septum


Neck:  supple, non-tender


Respiratory:  clear to auscultation, normal air movement


Cardiovascular:  regular rate and rhythm, nl pulses


Gastrointestinal:  soft, nl liver, spleen, non-tender


Musculoskeletal:  nl extremities to inspection, nl gait and stance


Extremities:  normal pulses


Neurological:  CNS II-XII intact, nl mental status, nl speech, nl strength


Skin:  nl turgor; 


   No rash or lesions


Lymph:  nl lymph nodes





Results


Results 24hrs





Laboratory Tests


    Test
            3/6/19
17:29  3/6/19
20:53  3/7/19
02:16  3/7/19
08:22


    Bedside Glucose         111          238  H         177           193


    Test
            3/7/19
13:12  
             
             



    Bedside Glucose        389  H








Medications


Medication





Current Medications


IV Flush (NS 3 ml) 3 ml PER PROTOCOL IV ;  Start 3/2/19 at 00:00


Ondansetron HCl (Zofran Inj) 4 mg Q6H  PRN IV NAUSEA/VOMITING;  Start 3/2/19 at 


00:00


Acetaminophen (Tylenol Tab) 650 mg Q6H  PRN PO .PAIN 1-3 OR TEMP Last administe


red on 3/2/19at 01:03; Admin Dose 650 MG;  Start 3/2/19 at 00:00


Acetaminophen/ Hydrocodone Bitart (Norco (5/325)) 1 tab Q6H  PRN PO .PAIN 4-6;  


Start 3/2/19 at 00:00


Zolpidem Tartrate (Ambien) 10 mg QHS  PRN PO .INSOMNIA Last administered on 


3/7/19at 02:14; Admin Dose 10 MG;  Start 3/2/19 at 00:00


Albuterol/ Ipratropium (Duoneb) 3 ml Q2H RESP THERAPY  PRN HHN SHORTNESS OF 


BREATH;  Start 3/2/19 at 00:00


Miscellaneous Information 1 ea NOTE XX ;  Start 3/2/19 at 00:00


Glucose (Glutose) 15 gm Q15M  PRN PO DECREASED GLUCOSE;  Start 3/2/19 at 00:00


Glucose (Glutose) 22.5 gm Q15M  PRN PO DECREASED GLUCOSE;  Start 3/2/19 at 00:00


Dextrose (D50w Syringe) 25 ml Q15M  PRN IV DECREASED GLUCOSE;  Start 3/2/19 at 


00:00


Dextrose (D50w Syringe) 50 ml Q15M  PRN IV DECREASED GLUCOSE;  Start 3/2/19 at 


00:00


Glucagon (Glucagen) 1 mg Q15M  PRN IM DECREASED GLUCOSE;  Start 3/2/19 at 00:00


Glucose (Glutose) 15 gm Q15M  PRN BUCCAL DECREASED GLUCOSE;  Start 3/2/19 at 


00:00


Insulin Aspart (Novolog Insulin Pen) NOVOLOG *MODERATE* ALGORITHM WITH MEALS  


BEDTIME SC  Last administered on 3/7/19at 08:25; Admin Dose 4 UNIT;  Start 


3/3/19 at 13:00


Hydromorphone HCl (Dilaudid) 1 mg Q6H  PRN IV SEVERE PAIN LEVEL 7-10 Last 


administered on 3/5/19at 03:27; Admin Dose 1 MG;  Start 3/4/19 at 19:00


Ferric Sodium Gluconate Complex 125 mg/Sodium Chloride 110 ml @  110 mls/hr 


DAILY@1300 IVPB  Last administered on 3/7/19at 12:22; Admin Dose 110 MLS/HR;  


Start 3/5/19 at 13:00;  Stop 3/9/19 at 13:59


Famotidine (Pepcid) 20 mg BID PO  Last administered on 3/7/19at 08:22; Admin 


Dose 20 MG;  Start 3/5/19 at 21:00


Insulin Glargine (Lantus) 20 units DAILY@0800 SC  Last administered on 3/7/19at 


08:26; Admin Dose 20 UNITS;  Start 3/6/19 at 08:00











HANNAH DOWNING MD           Mar 7, 2019 13:42

## 2019-03-08 VITALS — HEART RATE: 88 BPM | RESPIRATION RATE: 18 BRPM | SYSTOLIC BLOOD PRESSURE: 95 MMHG | DIASTOLIC BLOOD PRESSURE: 57 MMHG

## 2019-03-08 VITALS — SYSTOLIC BLOOD PRESSURE: 97 MMHG | DIASTOLIC BLOOD PRESSURE: 60 MMHG | RESPIRATION RATE: 18 BRPM | HEART RATE: 77 BPM

## 2019-03-08 VITALS — DIASTOLIC BLOOD PRESSURE: 58 MMHG | HEART RATE: 79 BPM | SYSTOLIC BLOOD PRESSURE: 95 MMHG | RESPIRATION RATE: 16 BRPM

## 2019-03-08 VITALS — HEART RATE: 82 BPM | SYSTOLIC BLOOD PRESSURE: 122 MMHG | DIASTOLIC BLOOD PRESSURE: 52 MMHG | RESPIRATION RATE: 18 BRPM

## 2019-03-08 RX ADMIN — ZOLPIDEM TARTRATE PRN MG: 5 TABLET, FILM COATED ORAL at 23:53

## 2019-03-08 RX ADMIN — FAMOTIDINE SCH MG: 20 TABLET ORAL at 21:14

## 2019-03-08 RX ADMIN — ZOLPIDEM TARTRATE 1 MG: 5 TABLET, FILM COATED ORAL at 23:53

## 2019-03-08 RX ADMIN — INSULIN ASPART 1 UNIT: 100 INJECTION, SOLUTION INTRAVENOUS; SUBCUTANEOUS at 17:52

## 2019-03-08 RX ADMIN — INSULIN ASPART 1 UNIT: 100 INJECTION, SOLUTION INTRAVENOUS; SUBCUTANEOUS at 12:57

## 2019-03-08 RX ADMIN — HYDROMORPHONE HYDROCHLORIDE 1 MG: 1 INJECTION, SOLUTION INTRAMUSCULAR; INTRAVENOUS; SUBCUTANEOUS at 21:15

## 2019-03-08 RX ADMIN — INSULIN ASPART 1 UNIT: 100 INJECTION, SOLUTION INTRAVENOUS; SUBCUTANEOUS at 08:00

## 2019-03-08 RX ADMIN — INSULIN GLARGINE SCH UNITS: 100 INJECTION, SOLUTION SUBCUTANEOUS at 08:00

## 2019-03-08 RX ADMIN — SODIUM FERRIC GLUCONATE COMPLEX 1 MLS/HR: 12.5 INJECTION INTRAVENOUS at 14:02

## 2019-03-08 RX ADMIN — FAMOTIDINE 1 MG: 20 TABLET ORAL at 21:14

## 2019-03-08 RX ADMIN — INSULIN ASPART 1 UNIT: 100 INJECTION, SOLUTION INTRAVENOUS; SUBCUTANEOUS at 21:00

## 2019-03-08 RX ADMIN — HYDROMORPHONE HYDROCHLORIDE 1 MG: 1 INJECTION, SOLUTION INTRAMUSCULAR; INTRAVENOUS; SUBCUTANEOUS at 08:06

## 2019-03-08 RX ADMIN — INSULIN ASPART 1 UNIT: 100 INJECTION, SOLUTION INTRAVENOUS; SUBCUTANEOUS at 12:58

## 2019-03-08 RX ADMIN — FAMOTIDINE SCH MG: 20 TABLET ORAL at 08:01

## 2019-03-08 RX ADMIN — INSULIN GLARGINE 1 UNITS: 100 INJECTION, SOLUTION SUBCUTANEOUS at 08:00

## 2019-03-08 RX ADMIN — SODIUM FERRIC GLUCONATE COMPLEX SCH MLS/HR: 12.5 INJECTION INTRAVENOUS at 14:02

## 2019-03-08 RX ADMIN — FAMOTIDINE 1 MG: 20 TABLET ORAL at 08:01

## 2019-03-08 RX ADMIN — HYDROMORPHONE HYDROCHLORIDE PRN MG: 1 INJECTION, SOLUTION INTRAMUSCULAR; INTRAVENOUS; SUBCUTANEOUS at 08:06

## 2019-03-08 RX ADMIN — INSULIN ASPART 1 UNIT: 100 INJECTION, SOLUTION INTRAVENOUS; SUBCUTANEOUS at 07:59

## 2019-03-08 NOTE — CONS
Assessment/Plan


Assessment/Plan


Hospital Course (Demo Recall)


59 yo with newly diagnosed colon adenoca


-CT chest shows 4 mm noncalcified nodule in the right apex, which is 


nonspecific. 


-CT AP: Irregular bowel wall thickening / mass with adjacent stranding of the 


distal transverse colon, compatible with given history of colon cancer. Shotty 


periaortic lymph nodes. The largest is an 11 mm node with normal fatty hilum, 


suggestive of reactive etiology.  Small nonspecific peripherally sclerotic 


lesion in the left iliac bone measuring 6 mm. Consider further evaluation with 


nuclear medicine bone scan versus attention on follow-up.


-bone scan negative for mets


-await final assessment from surgery on when surgery will be scheduled


-cont IV iron for iron deficiency anemia





Consultation Date/Type/Reason


Admit Date/Time


Mar 1, 2019 at 20:38


Initial Consult Date


3/6/19


Requesting Provider:  HANNAH DOWNING MD


Date/Time of Note


DATE: 3/8/19 


TIME: 13:20





24 HR Interval Summary


Free Text/Dictation


no complaints


just completed bone scan


Constitutional:  no complaints





Exam/Review of Systems


Exam


Vitals





Vital Signs


  Date      Temp  Pulse  Resp  B/P (MAP)   Pulse Ox  O2          O2 Flow    FiO2


Time                                                 Delivery    Rate


    3/8/19  98.4     77    18  97/60 (72)        97


     07:27


    3/6/19                                           Room Air


     14:14








Intake and Output





3/7/19


3/7/19


3/8/19





1515:00


23:00


07:00





IntakeIntake Total


1070 ml


480 ml


600 ml





BalanceBalance


1070 ml


480 ml


600 ml











Constitutional:  alert


Psych:  no complaints, nl mood/affect


Head:  normocephalic, atraumatic


Eyes:  nl conjunctiva, EOMI, nl lids, nl sclera, PERRL


Gastrointestinal:  soft, nl liver, spleen, non-tender


Musculoskeletal:  nl extremities to inspection, nl gait and stance





Results


Result Diagram:  


3/5/19 0950                                                                     


          3/5/19 0950





Results 24hrs





Laboratory Tests


    Test
            3/7/19
17:39  3/7/19
20:36  3/8/19
07:54  3/8/19
12:55


    Bedside Glucose         197           159           152          309  H








Medications


Medication





Current Medications


IV Flush (NS 3 ml) 3 ml PER PROTOCOL IV ;  Start 3/2/19 at 00:00


Ondansetron HCl (Zofran Inj) 4 mg Q6H  PRN IV NAUSEA/VOMITING;  Start 3/2/19 at 


00:00


Acetaminophen (Tylenol Tab) 650 mg Q6H  PRN PO .PAIN 1-3 OR TEMP Last 


administered on 3/2/19at 01:03; Admin Dose 650 MG;  Start 3/2/19 at 00:00


Acetaminophen/ Hydrocodone Bitart (Norco (5/325)) 1 tab Q6H  PRN PO .PAIN 4-6;  


Start 3/2/19 at 00:00


Zolpidem Tartrate (Ambien) 10 mg QHS  PRN PO .INSOMNIA Last administered on 


3/7/19at 23:57; Admin Dose 10 MG;  Start 3/2/19 at 00:00


Albuterol/ Ipratropium (Duoneb) 3 ml Q2H RESP THERAPY  PRN HHN SHORTNESS OF 


BREATH;  Start 3/2/19 at 00:00


Miscellaneous Information 1 ea NOTE XX ;  Start 3/2/19 at 00:00


Glucose (Glutose) 15 gm Q15M  PRN PO DECREASED GLUCOSE;  Start 3/2/19 at 00:00


Glucose (Glutose) 22.5 gm Q15M  PRN PO DECREASED GLUCOSE;  Start 3/2/19 at 00:00


Dextrose (D50w Syringe) 25 ml Q15M  PRN IV DECREASED GLUCOSE;  Start 3/2/19 at 


00:00


Dextrose (D50w Syringe) 50 ml Q15M  PRN IV DECREASED GLUCOSE;  Start 3/2/19 at 


00:00


Glucagon (Glucagen) 1 mg Q15M  PRN IM DECREASED GLUCOSE;  Start 3/2/19 at 00:00


Glucose (Glutose) 15 gm Q15M  PRN BUCCAL DECREASED GLUCOSE;  Start 3/2/19 at 


00:00


Insulin Aspart (Novolog Insulin Pen) NOVOLOG *MODERATE* ALGORITHM WITH MEALS  


BEDTIME SC  Last administered on 3/8/19at 12:57; Admin Dose 10 UNIT;  Start 


3/3/19 at 13:00


Hydromorphone HCl (Dilaudid) 1 mg Q6H  PRN IV SEVERE PAIN LEVEL 7-10 Last 


administered on 3/8/19at 08:06; Admin Dose 1 MG;  Start 3/4/19 at 19:00


Ferric Sodium Gluconate Complex 125 mg/Sodium Chloride 110 ml @  110 mls/hr 


DAILY@1300 IVPB  Last administered on 3/7/19at 12:22; Admin Dose 110 MLS/HR;  


Start 3/5/19 at 13:00;  Stop 3/9/19 at 13:59


Famotidine (Pepcid) 20 mg BID PO  Last administered on 3/8/19at 08:01; Admin 


Dose 20 MG;  Start 3/5/19 at 21:00


Insulin Glargine (Lantus) 20 units DAILY@0800 SC  Last administered on 3/8/19at 


08:00; Admin Dose 20 UNITS;  Start 3/6/19 at 08:00


Insulin Aspart (Novolog Insulin Pen) 6 unit WITH  MEALS SC  Last administered on


3/8/19at 12:58; Admin Dose 6 UNIT;  Start 3/7/19 at 18:00











LUÍS LOZANO                 Mar 8, 2019 13:22

## 2019-03-08 NOTE — PN
Date/Time of Note


Date/Time of Note


DATE: 3/8/19 


TIME: 15:56





Assessment/Plan


VTE Prophylaxis


Risk score (from Ns)>0 risk:  1


SCD applied (from Ns):  Yes


Pharmacological prophylaxis:  heparin





Lines/Catheters


IV Catheter Type (from Nrsg):  Peripheral IV


Urinary Cath still in place:  No





Assessment/Plan


Hospital Course


57 yo male with DM who presented with hematochezia.  Workup has revealed colon 


adenocarcinoma





Colon adenocarcinoma:


- Bone scan negative for mets


- s/p colonoscopy with path showing adenocarcinoma


- Dr Mcintyre following


- General surgery Dr Bullard for hemicolectomy





Iron deficiency anemia:


- IV iron





Diabetes


Sugars currently stable


A1c at 6.7


Continue Lantus and continue sliding scale


Home regimen unknown





CKD II:


- stable





Prophylaxis: SCDs





DC planning: Pending management plan of colon cancer. He has moved out of his 


home and planning to fly to [place] when discharged. He is hoping to have surgery 


prior to discharge


Result Diagram:  


3/5/19 0950                                                                     


          3/5/19 0950





Results 24hrs





Laboratory Tests


    Test
            3/7/19
17:39  3/7/19
20:36  3/8/19
07:54  3/8/19
12:55


    Bedside Glucose         197           159           152          309  H








Subjective


24 Hr Interval Summary


Free Text/Dictation


Bone scan negative, no mets


AC thrombosis from IV





Patinet doing well.  I offered him discharge with follow up with surgery as an 


outpatient.  He says he has moved out of his house in anticipation of moving 


back to [facility] and has nowhere to go back to.  He is hoping he can have the 


surgery performed here and wants to stay inpatient until it can be done given he


has no home now.





Exam/Review of Systems


Exam


Vitals





Vital Signs


  Date      Temp  Pulse  Resp  B/P (MAP)   Pulse Ox  O2          O2 Flow    FiO2


Time                                                 Delivery    Rate


    3/8/19  98.5     88    18  95/57 (70)        98


     13:57


    3/6/19                                           Room Air


     14:14








Intake and Output





3/7/19


3/7/19


3/8/19





1515:00


23:00


07:00





IntakeIntake Total


1070 ml


480 ml


600 ml





BalanceBalance


1070 ml


480 ml


600 ml











Constitutional:  alert, oriented, well developed


Psych:  no complaints, nl mood/affect


Head:  normocephalic, atraumatic


Eyes:  nl conjunctiva, EOMI, nl lids, nl sclera, PERRL


ENMT:  nl external ears & nose, nl lips & teeth, nl nasal mucosa & septum


Neck:  supple, non-tender


Respiratory:  clear to auscultation, normal air movement


Cardiovascular:  regular rate and rhythm, nl pulses


Gastrointestinal:  soft, nl liver, spleen, non-tender


Musculoskeletal:  nl extremities to inspection, nl gait and stance


Extremities:  normal pulses


Neurological:  CNS II-XII intact, nl mental status, nl speech, nl strength


Skin:  nl turgor; 


   No rash or lesions


Lymph:  nl lymph nodes





Results


Results 24hrs





Laboratory Tests


    Test
            3/7/19
17:39  3/7/19
20:36  3/8/19
07:54  3/8/19
12:55


    Bedside Glucose         197           159           152          309  H








Medications


Medication





Current Medications


IV Flush (NS 3 ml) 3 ml PER PROTOCOL IV ;  Start 3/2/19 at 00:00


Ondansetron HCl (Zofran Inj) 4 mg Q6H  PRN IV NAUSEA/VOMITING;  Start 3/2/19 at 


00:00


Acetaminophen (Tylenol Tab) 650 mg Q6H  PRN PO .PAIN 1-3 OR TEMP Last 


administered on 3/2/19at 01:03; Admin Dose 650 MG;  Start 3/2/19 at 00:00


Acetaminophen/ Hydrocodone Bitart (Norco (5/325)) 1 tab Q6H  PRN PO .PAIN 4-6;  


Start 3/2/19 at 00:00


Zolpidem Tartrate (Ambien) 10 mg QHS  PRN PO .INSOMNIA Last administered on 


3/7/19at 23:57; Admin Dose 10 MG;  Start 3/2/19 at 00:00


Albuterol/ Ipratropium (Duoneb) 3 ml Q2H RESP THERAPY  PRN HHN SHORTNESS OF 


BREATH;  Start 3/2/19 at 00:00


Miscellaneous Information 1 ea NOTE XX ;  Start 3/2/19 at 00:00


Glucose (Glutose) 15 gm Q15M  PRN PO DECREASED GLUCOSE;  Start 3/2/19 at 00:00


Glucose (Glutose) 22.5 gm Q15M  PRN PO DECREASED GLUCOSE;  Start 3/2/19 at 00:00


Dextrose (D50w Syringe) 25 ml Q15M  PRN IV DECREASED GLUCOSE;  Start 3/2/19 at 


00:00


Dextrose (D50w Syringe) 50 ml Q15M  PRN IV DECREASED GLUCOSE;  Start 3/2/19 at 


00:00


Glucagon (Glucagen) 1 mg Q15M  PRN IM DECREASED GLUCOSE;  Start 3/2/19 at 00:00


Glucose (Glutose) 15 gm Q15M  PRN BUCCAL DECREASED GLUCOSE;  Start 3/2/19 at 


00:00


Insulin Aspart (Novolog Insulin Pen) NOVOLOG *MODERATE* ALGORITHM WITH MEALS  


BEDTIME SC  Last administered on 3/8/19at 12:57; Admin Dose 10 UNIT;  Start 


3/3/19 at 13:00


Hydromorphone HCl (Dilaudid) 1 mg Q6H  PRN IV SEVERE PAIN LEVEL 7-10 Last 


administered on 3/8/19at 08:06; Admin Dose 1 MG;  Start 3/4/19 at 19:00


Ferric Sodium Gluconate Complex 125 mg/Sodium Chloride 110 ml @  110 mls/hr 


DAILY@1300 IVPB  Last administered on 3/8/19at 14:02; Admin Dose 110 MLS/HR;  


Start 3/5/19 at 13:00;  Stop 3/9/19 at 13:59


Famotidine (Pepcid) 20 mg BID PO  Last administered on 3/8/19at 08:01; Admin 


Dose 20 MG;  Start 3/5/19 at 21:00


Insulin Glargine (Lantus) 20 units DAILY@0800 SC  Last administered on 3/8/19at 


08:00; Admin Dose 20 UNITS;  Start 3/6/19 at 08:00


Insulin Aspart (Novolog Insulin Pen) 6 unit WITH  MEALS SC  Last administered on


3/8/19at 12:58; Admin Dose 6 UNIT;  Start 3/7/19 at 18:00











HANNAH DOWNING MD           Mar 8, 2019 15:58

## 2019-03-09 VITALS — DIASTOLIC BLOOD PRESSURE: 55 MMHG | SYSTOLIC BLOOD PRESSURE: 93 MMHG | HEART RATE: 80 BPM | RESPIRATION RATE: 16 BRPM

## 2019-03-09 VITALS — SYSTOLIC BLOOD PRESSURE: 98 MMHG | HEART RATE: 77 BPM | DIASTOLIC BLOOD PRESSURE: 53 MMHG | RESPIRATION RATE: 18 BRPM

## 2019-03-09 VITALS — HEART RATE: 74 BPM | SYSTOLIC BLOOD PRESSURE: 88 MMHG | DIASTOLIC BLOOD PRESSURE: 51 MMHG | RESPIRATION RATE: 16 BRPM

## 2019-03-09 VITALS — RESPIRATION RATE: 18 BRPM | SYSTOLIC BLOOD PRESSURE: 93 MMHG | HEART RATE: 66 BPM | DIASTOLIC BLOOD PRESSURE: 57 MMHG

## 2019-03-09 RX ADMIN — FAMOTIDINE 1 MG: 20 TABLET ORAL at 20:32

## 2019-03-09 RX ADMIN — FAMOTIDINE SCH MG: 20 TABLET ORAL at 08:47

## 2019-03-09 RX ADMIN — FAMOTIDINE 1 MG: 20 TABLET ORAL at 08:47

## 2019-03-09 RX ADMIN — SODIUM FERRIC GLUCONATE COMPLEX 1 MLS/HR: 12.5 INJECTION INTRAVENOUS at 13:39

## 2019-03-09 RX ADMIN — INSULIN GLARGINE 1 UNITS: 100 INJECTION, SOLUTION SUBCUTANEOUS at 08:46

## 2019-03-09 RX ADMIN — FAMOTIDINE SCH MG: 20 TABLET ORAL at 20:32

## 2019-03-09 RX ADMIN — INSULIN ASPART 1 UNIT: 100 INJECTION, SOLUTION INTRAVENOUS; SUBCUTANEOUS at 20:32

## 2019-03-09 RX ADMIN — INSULIN GLARGINE SCH UNITS: 100 INJECTION, SOLUTION SUBCUTANEOUS at 08:46

## 2019-03-09 RX ADMIN — SODIUM FERRIC GLUCONATE COMPLEX SCH MLS/HR: 12.5 INJECTION INTRAVENOUS at 13:39

## 2019-03-09 RX ADMIN — HYDROMORPHONE HYDROCHLORIDE 1 MG: 1 INJECTION, SOLUTION INTRAMUSCULAR; INTRAVENOUS; SUBCUTANEOUS at 20:38

## 2019-03-09 RX ADMIN — INSULIN ASPART 1 UNIT: 100 INJECTION, SOLUTION INTRAVENOUS; SUBCUTANEOUS at 12:49

## 2019-03-09 RX ADMIN — HYDROMORPHONE HYDROCHLORIDE PRN MG: 1 INJECTION, SOLUTION INTRAMUSCULAR; INTRAVENOUS; SUBCUTANEOUS at 20:38

## 2019-03-09 RX ADMIN — INSULIN ASPART 1 UNIT: 100 INJECTION, SOLUTION INTRAVENOUS; SUBCUTANEOUS at 18:14

## 2019-03-09 RX ADMIN — HYDROMORPHONE HYDROCHLORIDE 1 MG: 1 INJECTION, SOLUTION INTRAMUSCULAR; INTRAVENOUS; SUBCUTANEOUS at 13:41

## 2019-03-09 RX ADMIN — HYDROMORPHONE HYDROCHLORIDE PRN MG: 1 INJECTION, SOLUTION INTRAMUSCULAR; INTRAVENOUS; SUBCUTANEOUS at 13:41

## 2019-03-09 RX ADMIN — INSULIN ASPART 1 UNIT: 100 INJECTION, SOLUTION INTRAVENOUS; SUBCUTANEOUS at 08:47

## 2019-03-09 RX ADMIN — INSULIN ASPART 1 UNIT: 100 INJECTION, SOLUTION INTRAVENOUS; SUBCUTANEOUS at 18:13

## 2019-03-09 RX ADMIN — INSULIN ASPART 1 UNIT: 100 INJECTION, SOLUTION INTRAVENOUS; SUBCUTANEOUS at 12:50

## 2019-03-09 NOTE — PN
Date/Time of Note


Date/Time of Note


DATE: 3/9/19 


TIME: 13:28





Assessment/Plan


VTE Prophylaxis


Risk score (from Nsg)>0 risk:  1


SCD applied (from Nsg):  Yes


Pharmacological prophylaxis:  heparin





Lines/Catheters


IV Catheter Type (from Nrsg):  Peripheral IV


Urinary Cath still in place:  No





Assessment/Plan


Hospital Course


59 yo male with DM who presented with hematochezia.  Workup has revealed colon 


adenocarcinoma





Colon adenocarcinoma:


- Bone scan negative for mets


- s/p colonoscopy with path showing adenocarcinoma


- Dr Mcintyre following


- General surgery Dr Dugan has agreed to take the case (Dr Bullard out of town)


- Patient has no home to go to, planning to return to [facility]. I am worried about 


his ability to follow up so am keeping him in house until surgery can be 


arranged





Iron deficiency anemia:


- IV iron





Diabetes


- Basal/bolus insulin





CKD II:


- stable





Prophylaxis: SCDs





DC planning: Pending management plan of colon cancer. He has moved out of his 


home and planning to fly to [facility] when discharged. He is hoping to have surgery 


prior to discharge


Result Diagram:  


3/5/19 0950                                                                     


          3/5/19 0950





Results 24hrs





Laboratory Tests


    Test
            3/8/19
17:51  3/8/19
21:13  3/9/19
08:24  3/9/19
12:14


    Bedside Glucose         109           114           168          299  H








Subjective


24 Hr Interval Summary


Free Text/Dictation


No change to clinical status


Again says he has no where to go at discharge.  Awaiting surgical plan.  Dr uBllard out of town.  Dr Dugan has agreed to see the patient





Exam/Review of Systems


Exam


Vitals





Vital Signs


  Date      Temp  Pulse  Resp  B/P (MAP)   Pulse Ox  O2          O2 Flow    FiO2


Time                                                 Delivery    Rate


    3/9/19  98.4     74    16  88/51 (63)       100


     07:21


    3/6/19                                           Room Air


     14:14








Intake and Output





3/8/19


3/8/19


3/9/19





1515:00


23:00


07:00





IntakeIntake Total


590 ml


360 ml





BalanceBalance


590 ml


360 ml











Constitutional:  alert, oriented, well developed


Psych:  no complaints, nl mood/affect


Head:  normocephalic, atraumatic


Eyes:  nl conjunctiva, EOMI, nl lids, nl sclera, PERRL


ENMT:  nl external ears & nose, nl lips & teeth, nl nasal mucosa & septum


Neck:  supple, non-tender


Respiratory:  clear to auscultation, normal air movement


Cardiovascular:  regular rate and rhythm, nl pulses


Gastrointestinal:  soft, nl liver, spleen, non-tender


Musculoskeletal:  nl extremities to inspection, nl gait and stance


Extremities:  normal pulses


Neurological:  CNS II-XII intact, nl mental status, nl speech, nl strength


Skin:  nl turgor; 


   No rash or lesions


Lymph:  nl lymph nodes





Results


Results 24hrs





Laboratory Tests


    Test
            3/8/19
17:51  3/8/19
21:13  3/9/19
08:24  3/9/19
12:14


    Bedside Glucose         109           114           168          299  H








Medications


Medication





Current Medications


IV Flush (NS 3 ml) 3 ml PER PROTOCOL IV ;  Start 3/2/19 at 00:00


Ondansetron HCl (Zofran Inj) 4 mg Q6H  PRN IV NAUSEA/VOMITING;  Start 3/2/19 at 


00:00


Acetaminophen (Tylenol Tab) 650 mg Q6H  PRN PO .PAIN 1-3 OR TEMP Last 


administered on 3/2/19at 01:03; Admin Dose 650 MG;  Start 3/2/19 at 00:00


Acetaminophen/ Hydrocodone Bitart (Norco (5/325)) 1 tab Q6H  PRN PO .PAIN 4-6;  


Start 3/2/19 at 00:00


Zolpidem Tartrate (Ambien) 10 mg QHS  PRN PO .INSOMNIA Last administered on 


3/8/19at 23:53; Admin Dose 10 MG;  Start 3/2/19 at 00:00


Albuterol/ Ipratropium (Duoneb) 3 ml Q2H RESP THERAPY  PRN HHN SHORTNESS OF 


BREATH;  Start 3/2/19 at 00:00


Miscellaneous Information 1 ea NOTE XX ;  Start 3/2/19 at 00:00


Glucose (Glutose) 15 gm Q15M  PRN PO DECREASED GLUCOSE;  Start 3/2/19 at 00:00


Glucose (Glutose) 22.5 gm Q15M  PRN PO DECREASED GLUCOSE;  Start 3/2/19 at 00:00


Dextrose (D50w Syringe) 25 ml Q15M  PRN IV DECREASED GLUCOSE;  Start 3/2/19 at 


00:00


Dextrose (D50w Syringe) 50 ml Q15M  PRN IV DECREASED GLUCOSE;  Start 3/2/19 at 


00:00


Glucagon (Glucagen) 1 mg Q15M  PRN IM DECREASED GLUCOSE;  Start 3/2/19 at 00:00


Glucose (Glutose) 15 gm Q15M  PRN BUCCAL DECREASED GLUCOSE;  Start 3/2/19 at 


00:00


Insulin Aspart (Novolog Insulin Pen) NOVOLOG *MODERATE* ALGORITHM WITH MEALS  


BEDTIME SC  Last administered on 3/9/19at 12:49; Admin Dose 8 UNIT;  Start 


3/3/19 at 13:00


Hydromorphone HCl (Dilaudid) 1 mg Q6H  PRN IV SEVERE PAIN LEVEL 7-10 Last 


administered on 3/8/19at 08:06; Admin Dose 1 MG;  Start 3/4/19 at 19:00


Ferric Sodium Gluconate Complex 125 mg/Sodium Chloride 110 ml @  110 mls/hr 


DAILY@1300 IVPB  Last administered on 3/8/19at 14:02; Admin Dose 110 MLS/HR;  


Start 3/5/19 at 13:00;  Stop 3/9/19 at 13:59


Famotidine (Pepcid) 20 mg BID PO  Last administered on 3/9/19at 08:47; Admin 


Dose 20 MG;  Start 3/5/19 at 21:00


Insulin Glargine (Lantus) 20 units DAILY@0800 SC  Last administered on 3/9/19at 


08:46; Admin Dose 20 UNITS;  Start 3/6/19 at 08:00


Insulin Aspart (Novolog Insulin Pen) 6 unit WITH  MEALS SC  Last administered on


3/9/19at 12:50; Admin Dose 6 UNIT;  Start 3/7/19 at 18:00


Hydromorphone HCl (Dilaudid) 1 mg Q6H  PRN IM SEVERE PAIN LEVEL 7-10 Last 


administered on 3/8/19at 21:15; Admin Dose 1 MG;  Start 3/8/19 at 21:00











HANNAH DOWNING MD           Mar 9, 2019 13:30

## 2019-03-10 VITALS — HEART RATE: 66 BPM | DIASTOLIC BLOOD PRESSURE: 53 MMHG | SYSTOLIC BLOOD PRESSURE: 111 MMHG | RESPIRATION RATE: 16 BRPM

## 2019-03-10 VITALS — DIASTOLIC BLOOD PRESSURE: 53 MMHG | RESPIRATION RATE: 18 BRPM | SYSTOLIC BLOOD PRESSURE: 91 MMHG | HEART RATE: 87 BPM

## 2019-03-10 VITALS — RESPIRATION RATE: 16 BRPM | HEART RATE: 66 BPM | SYSTOLIC BLOOD PRESSURE: 104 MMHG | DIASTOLIC BLOOD PRESSURE: 59 MMHG

## 2019-03-10 VITALS — HEART RATE: 94 BPM | SYSTOLIC BLOOD PRESSURE: 106 MMHG | RESPIRATION RATE: 16 BRPM | DIASTOLIC BLOOD PRESSURE: 60 MMHG

## 2019-03-10 LAB — TROPONIN-I: < 0.012 NG/ML (ref 0–0.12)

## 2019-03-10 RX ADMIN — INSULIN ASPART 1 UNIT: 100 INJECTION, SOLUTION INTRAVENOUS; SUBCUTANEOUS at 08:20

## 2019-03-10 RX ADMIN — FAMOTIDINE SCH MG: 20 TABLET ORAL at 20:54

## 2019-03-10 RX ADMIN — INSULIN ASPART 1 UNIT: 100 INJECTION, SOLUTION INTRAVENOUS; SUBCUTANEOUS at 18:17

## 2019-03-10 RX ADMIN — INSULIN ASPART 1 UNIT: 100 INJECTION, SOLUTION INTRAVENOUS; SUBCUTANEOUS at 21:00

## 2019-03-10 RX ADMIN — HYDROMORPHONE HYDROCHLORIDE PRN MG: 1 INJECTION, SOLUTION INTRAMUSCULAR; INTRAVENOUS; SUBCUTANEOUS at 11:08

## 2019-03-10 RX ADMIN — HYDROMORPHONE HYDROCHLORIDE 1 MG: 1 INJECTION, SOLUTION INTRAMUSCULAR; INTRAVENOUS; SUBCUTANEOUS at 18:02

## 2019-03-10 RX ADMIN — FAMOTIDINE SCH MG: 20 TABLET ORAL at 08:22

## 2019-03-10 RX ADMIN — FAMOTIDINE 1 MG: 20 TABLET ORAL at 08:22

## 2019-03-10 RX ADMIN — HYDROMORPHONE HYDROCHLORIDE 1 MG: 1 INJECTION, SOLUTION INTRAMUSCULAR; INTRAVENOUS; SUBCUTANEOUS at 11:08

## 2019-03-10 RX ADMIN — ZOLPIDEM TARTRATE 1 MG: 5 TABLET, FILM COATED ORAL at 00:14

## 2019-03-10 RX ADMIN — FAMOTIDINE 1 MG: 20 TABLET ORAL at 20:54

## 2019-03-10 RX ADMIN — INSULIN ASPART 1 UNIT: 100 INJECTION, SOLUTION INTRAVENOUS; SUBCUTANEOUS at 08:21

## 2019-03-10 RX ADMIN — INSULIN ASPART 1 UNIT: 100 INJECTION, SOLUTION INTRAVENOUS; SUBCUTANEOUS at 12:48

## 2019-03-10 RX ADMIN — ZOLPIDEM TARTRATE PRN MG: 5 TABLET, FILM COATED ORAL at 00:14

## 2019-03-10 RX ADMIN — INSULIN ASPART 1 UNIT: 100 INJECTION, SOLUTION INTRAVENOUS; SUBCUTANEOUS at 12:49

## 2019-03-10 RX ADMIN — HYDROMORPHONE HYDROCHLORIDE PRN MG: 1 INJECTION, SOLUTION INTRAMUSCULAR; INTRAVENOUS; SUBCUTANEOUS at 18:02

## 2019-03-10 RX ADMIN — INSULIN GLARGINE 1 UNITS: 100 INJECTION, SOLUTION SUBCUTANEOUS at 08:22

## 2019-03-10 RX ADMIN — INSULIN ASPART 1 UNIT: 100 INJECTION, SOLUTION INTRAVENOUS; SUBCUTANEOUS at 18:18

## 2019-03-10 RX ADMIN — INSULIN GLARGINE SCH UNITS: 100 INJECTION, SOLUTION SUBCUTANEOUS at 08:22

## 2019-03-10 NOTE — PN
Date/Time of Note


Date/Time of Note


DATE: 3/10/19 


TIME: 15:39





Assessment/Plan


VTE Prophylaxis


Risk score (from Nsg)>0 risk:  1


SCD applied (from Nsg):  Yes


Pharmacological prophylaxis:  heparin





Lines/Catheters


IV Catheter Type (from Nrsg):  Saline Lock


Urinary Cath still in place:  No





Assessment/Plan


Hospital Course


59 yo male with DM who presented with hematochezia.  Workup has revealed colon 


adenocarcinoma





Colon adenocarcinoma:


- Bone scan negative for mets


- s/p colonoscopy with path showing adenocarcinoma


- Dr Mcintyre following


- General surgery Dr Dugan has agreed to take the case (Dr Bullard out of town)


- Patient has no home to go to, planning to return to [facility]. I am worried about 


his ability to follow up so am keeping him in house until surgery can be 


arranged





Iron deficiency anemia:


- IV iron





Diabetes


- Basal/bolus insulin





CKD II:


- stable





Prophylaxis: SCDs





DC planning: Pending surgical management plan of colon cancer. He has moved out 


of his home and planning to fly to [place] when discharged. I hope to arrange 


surgery for him prior to discharge as I am doubtful of his ability to follow up 


as an outpatient


Results 24hrs





Laboratory Tests


   Test
            3/9/19
18:00  3/9/19
20:32  3/10/19
08:13  3/10/19
12:32


   Bedside Glucose         170           146            167            150








Subjective


24 Hr Interval Summary


Free Text/Dictation


No change to clinical status


Awaiting surgical plan





Exam/Review of Systems


Exam


Vitals





Vital Signs


  Date      Temp  Pulse  Resp  B/P (MAP)   Pulse Ox  O2          O2 Flow    FiO2


Time                                                 Delivery    Rate


   3/10/19  98.1     94    16      106/60        99


     13:57                           (75)


   3/10/19                                           Room Air


     02:00








Intake and Output





3/9/19


3/9/19


3/10/19





1515:00


23:00


07:00





IntakeIntake Total


1100 ml


720 ml





BalanceBalance


1100 ml


720 ml











Constitutional:  alert, oriented, well developed


Psych:  no complaints, nl mood/affect


Head:  normocephalic, atraumatic


Eyes:  nl conjunctiva, EOMI, nl lids, nl sclera, PERRL


ENMT:  nl external ears & nose, nl lips & teeth, nl nasal mucosa & septum


Neck:  supple, non-tender


Respiratory:  clear to auscultation, normal air movement


Cardiovascular:  regular rate and rhythm, nl pulses


Gastrointestinal:  soft, nl liver, spleen, non-tender


Musculoskeletal:  nl extremities to inspection, nl gait and stance


Extremities:  normal pulses


Neurological:  CNS II-XII intact, nl mental status, nl speech, nl strength


Skin:  nl turgor; 


   No rash or lesions


Lymph:  nl lymph nodes





Results


Results 24hrs





Laboratory Tests


   Test
            3/9/19
18:00  3/9/19
20:32  3/10/19
08:13  3/10/19
12:32


   Bedside Glucose         170           146            167            150








Medications


Medication





Current Medications


IV Flush (NS 3 ml) 3 ml PER PROTOCOL IV ;  Start 3/2/19 at 00:00


Ondansetron HCl (Zofran Inj) 4 mg Q6H  PRN IV NAUSEA/VOMITING;  Start 3/2/19 at 


00:00


Acetaminophen (Tylenol Tab) 650 mg Q6H  PRN PO .PAIN 1-3 OR TEMP Last 


administered on 3/2/19at 01:03; Admin Dose 650 MG;  Start 3/2/19 at 00:00


Acetaminophen/ Hydrocodone Bitart (Norco (5/325)) 1 tab Q6H  PRN PO .PAIN 4-6;  


Start 3/2/19 at 00:00


Zolpidem Tartrate (Ambien) 10 mg QHS  PRN PO .INSOMNIA Last administered on 


3/10/19at 00:14; Admin Dose 10 MG;  Start 3/2/19 at 00:00


Albuterol/ Ipratropium (Duoneb) 3 ml Q2H RESP THERAPY  PRN HHN SHORTNESS OF 


BREATH;  Start 3/2/19 at 00:00


Miscellaneous Information 1 ea NOTE XX ;  Start 3/2/19 at 00:00


Glucose (Glutose) 15 gm Q15M  PRN PO DECREASED GLUCOSE;  Start 3/2/19 at 00:00


Glucose (Glutose) 22.5 gm Q15M  PRN PO DECREASED GLUCOSE;  Start 3/2/19 at 00:00


Dextrose (D50w Syringe) 25 ml Q15M  PRN IV DECREASED GLUCOSE;  Start 3/2/19 at 


00:00


Dextrose (D50w Syringe) 50 ml Q15M  PRN IV DECREASED GLUCOSE;  Start 3/2/19 at 


00:00


Glucagon (Glucagen) 1 mg Q15M  PRN IM DECREASED GLUCOSE;  Start 3/2/19 at 00:00


Glucose (Glutose) 15 gm Q15M  PRN BUCCAL DECREASED GLUCOSE;  Start 3/2/19 at 


00:00


Insulin Aspart (Novolog Insulin Pen) NOVOLOG *MODERATE* ALGORITHM WITH MEALS  


BEDTIME SC  Last administered on 3/10/19at 12:48; Admin Dose 2 UNIT;  Start 


3/3/19 at 13:00


Hydromorphone HCl (Dilaudid) 1 mg Q6H  PRN IV SEVERE PAIN LEVEL 7-10 Last 


administered on 3/10/19at 11:08; Admin Dose 1 MG;  Start 3/4/19 at 19:00


Famotidine (Pepcid) 20 mg BID PO  Last administered on 3/10/19at 08:22; Admin 


Dose 20 MG;  Start 3/5/19 at 21:00


Insulin Glargine (Lantus) 20 units DAILY@0800 SC  Last administered on 3/10/19at


08:22; Admin Dose 20 UNITS;  Start 3/6/19 at 08:00


Hydromorphone HCl (Dilaudid) 1 mg Q6H  PRN IM SEVERE PAIN LEVEL 7-10 Last 


administered on 3/8/19at 21:15; Admin Dose 1 MG;  Start 3/8/19 at 21:00


Insulin Aspart (Novolog Insulin Pen) 8 unit WITH  MEALS SC  Last administered on


3/10/19at 12:49; Admin Dose 8 UNIT;  Start 3/9/19 at 18:00











HANNAH DOWNING MD          Mar 10, 2019 15:40

## 2019-03-10 NOTE — CONS
DATE OF ADMISSION: 03/01/2019

DATE OF CONSULTATION:  03/10/2019

 

 

 

TYPE OF CONSULTATION:  Cardiology.

 

REASON FOR CONSULTATION:  Preoperative evaluation prior to colectomy.

 

REQUESTING PHYSICIAN:  Wolf Dexter MD

 

HISTORY OF PRESENT ILLNESS:  Mr. Barba is a 58-year-old male with a history of diabetes mellitus 
who initially presented on 03/01/2019 with complaints of abdominal pain, diarrhea, rectal bleeding.  
The patient initially went to outside hospital at [facility], where the patient had initi
al evaluation with hypertension, dyslipidemia, diabetes mellitus on insulin, coronary artery disease 
on Plavix, ongoing tobacco usage, who initially presented with complaints of abdominal pain, loose st
ools, rectal bleeding.  The patient initially had presented to outside hospital at [place], where he 
had a temperature of 98.1, blood pressure 97/57, pulse 133, respiratory rate 20, satting 98%.  The [facility]'s labs notable for sodium 138, potassium not listed, chloride 97, creatinine 1.71, hemoglobin 9
.2.  The patient had an abdominopelvic CT done and reveals possible underlying mass and adjacent tiny
 lymph nodes.  The patient was thereafter transferred to [facility] due to insuranc
e reasons.  Since arrival at [facility], the patient has been counseled by the GI s
frederic and underwent an endoscopy, findings of mass in the right-sided colon and underwent biopsy wi
th biopsy returning positive for adenocarcinoma well differentiated, is being seen by the hematology/
oncology services and has been consulted by surgery with plans for surgical resection of the patient'
s mass.  Given the patient's multiple cardiac risk factors and possible placement of a prior stent, a
 cardiology consult was requested.

 

PAST MEDICAL HISTORY:  As above in HPI with per chart biopsy, the patient appeared to have most recen
t catheterization in 06/2017 and possibly a stent placed in 09/2016.

 

MEDICATIONS CURRENTLY IN HOSPITAL:

1.  Insulin.

2.  Dilaudid.

3.  Pepcid 20 mg p.o. b.i.d.

4.  Tylenol p.r.n.

5.  Norco p.r.n.

 

ALLERGIES:  NO KNOWN DRUG ALLERGIES.

 

SOCIAL HISTORY:  Ongoing tobacco usage.  No EtOH or illicit drug use.

 

FAMILY HISTORY:  No history of sudden cardiac death or early CAD.

 

REVIEW OF SYSTEMS:  As above in HPI.

CONSTITUTIONAL:  No fevers, chills.

PULMONARY:  No current shortness of breath.

CARDIOVASCULAR:  No current chest pain.

GASTROINTESTINAL:  No vomiting.  Abdominal pain.

GENITOURINARY:  No hematuria.

MUSCULOSKELETAL:  Degenerative joint disease.

PSYCHIATRIC:  No documented psych history.

NEUROLOGIC:  No documented history of CVA.

 

PHYSICAL EXAMINATION

VITAL SIGNS:  Temperature 98.1, blood pressure 106/60, pulse 94, respiratory rate 16, satting 98%.

GENERAL:  The patient is alert, awake, complaining of abdominal pain.

NECK:  JVP approximately is 8 to 9 cm of water.

CHEST:  Fair air movement throughout.

HEART:  Regular rate and rhythm.  Normal S1, S2, I/VI systolic murmur, nondisplaced PMI.

ABDOMEN:  Positive bowel sounds, soft, diffuse tenderness to palpation.

EXTREMITIES:  No significant pitting edema, 1+ pulses bilateral posterior tibial.

 

LABORATORY DATA:  Most recent BMP from 03/05/2019:  Sodium 139, potassium 4.3, creatinine 1.27.  In a
ddition from 03/05/2019:  White blood cell count of 8.6, hemoglobin 8.2, platelet count of 333.

 

IMAGING STUDIES:  Chest CT from 03/07/2019 revealing noncalcified nodular in right apex of the lungs,
 possibly ____ from previously described ____ in distal colon described on abdominal CT.  A venous ul
trasound revealed thrombosed of left basilic vein and left upper arm, antecubital fossa and bone scan
 revealed negative whole body bone scan, no evidence of neoplastic disease.

 

ELECTROCARDIOGRAM:  There were no electrocardiograms for my review at this time.

 

IMPRESSION:

1.  Preoperative evaluation in a patient prior to colectomy that has multiple cardiac risk factors an
d possible prior stent apparently placed approximately 2 to 3 years prior.

2.  Hypotension, borderline.

3.  History of possible prior stent placements 2 to 3 years prior per chart biopsy.

4.  Dyslipidemia.

5.  Adenocarcinoma of the colon, newly diagnosed.

6.  Abdominal pain secondary to adenocarcinoma of the colon.

7.  Diabetes mellitus.

8.  Anemia.

9.  Renal failure insufficiency.

 

RECOMMENDATIONS:

1.  At this time, we would check baseline EKG now and repeat EKG in morning to assess for any abnorma
lities and thereafter changes.

2.  Send troponins q.6 x2 to assure the patient has not had any recent coronary syndromes in anticipa
tion of upcoming surgery.

3.  Check 2D echo for this patient's ejection fraction, wall motion, rule any major abnormalities.

4.  We are going to attempt to obtain the patient's cath report from [facility] most rece
ntly to further assess the patient's current anatomy and we will consider stress test in the morning 
to further assess possibility of any obstructive coronary artery disease in this patient with a histo
ry of prior possible stents and multiple cardiac risk factors and ongoing tobacco usage.

 

Thank you for allowing me to take part in the care of this patient.  I will continue to follow him ve
ry closely with you with further recommendations will be made as the patient progresses through his [facility] clinical course.

 

 

Dictated By: SHERINE OVIEDO/JAROD

DD:    03/10/2019 18:03:49

DT:    03/10/2019 18:54:53

Conf#: [id number]

DID#:  [id number]

CC: LINDA MACE MD; WOLF DEXTER MD; MIREILLE JACK;*EndCC*

## 2019-03-10 NOTE — CONS
Assessment/Plan


Assessment/Plan


Hospital Course (Demo Recall)


1.  Transverse colon adenocarcinoma on CT distal however on colonoscopy and CT 


from [place] proximal.  The patient was tattooed.  Shotty lymph nodes.  Previous


surgeon unable to coordinate surgery therefore a secondary consult was obtained.


 Patient with history of ex lap due to bowel obstruction will make this more 


difficult operation.


-Medical/cardiac optimization


-Surgical resection will be scheduled


2.  40 pound weight loss secondary to above


3.  Diabetes mellitus type 2


-Nutrition medication optimization


4.  Hypertension


-Nutrition medication optimization


5.  Coronary artery disease with history of Plavix and aspirin


-Cardiac evaluation and optimization


6.  Iron deficiency anemia secondary to above


-Iron transfusion per hematology


-As above





Thank you very much for consulting me in this patient's care,





Consultation Date/Type/Reason


Admit Date/Time


Mar 1, 2019 at 20:38


Date of Consultation:  Mar 10, 2019


Type of Consult


Minimally invasive general surgical (initial consultant surgeon unavailable)


Reason for Consultation


Colon cancer


Requesting Provider:  HANNAH DOWNING MD


Date/Time of Note


DATE: 3/10/19 


TIME: 16:08





Hx of Present Illness


Mauricio Barba is a 57yo male with multiple comorbidities who had initially 


presented to [facility] for anemia and positive guaiac and 


occasional diarrhea he also has occasional pain that began about 2 months ago.  


He has lost 40 pounds, unintentionally over the past 5 months.  At [facility] patient had a CT scan abdomen/pelvis without contrast in at the hepatic


flexure of the colon and transverse colon, there is a focal wall thickening and 


adjacent fat stranding. There are several adjacent tiny lymph nodes which are 


subcentimeter.  Findings appear unchanged compared with 1/30/19.  The small 


bowel loops are nondilated.  Patient was transferred to [facility] for 


further evaluation due to insurance capitation.  Denies nausea/vomiting, 


hematemesis, hematochezia, dysuria, headache, visual or neurologic changes.  No 


chest pain or shortness of breath.  No cough.  Patient was seen by surgeon on 


call however they are unable to coordinate time and secondary consult is 


obtained.





CT scan March 6 identified:


1. Irregular bowel wall thickening / mass with adjacent stranding of the distal 


transverse colon, compatible with given history of colon cancer.


2. Shotty periaortic lymph nodes. The largest is an 11 mm node with normal fatty


hilum, suggestive of reactive etiology. However, attention on follow-up is 


recommended.


3. Small nonspecific peripherally sclerotic lesion in the left iliac bone 


measuring 6 mm. Consider further evaluation with nuclear medicine bone scan 


versus attention on follow-up.





CT chest:


4 mm noncalcified nodule in the right apex, which is nonspecific. Follow-up 


chest CT as per oncologic protocol is recommended. 


Partial visualization of previously described irregular circumferential mass of 


the distal transverse colon, extending to the splenic flexure. There is 


extension into the gastrohepatic ligament effacing the fat planes with the 


greater curvature of the stomach.


Cholelithiasis.





CEA negative


   


Colonoscopy 3/4/19:


Mass in the right colonproximal ascending colon


Unable to transversepartial bowel obstruction


Colon mass likely malignancy.  Noted in the proximal transverse colon/


Tattoo placed for surgical evaluation/follow-up


   


Path:


Adenocarcinoma, well-differentiated, with focal surface ulceration. 


One fragment shows changes suggestive of residual tubulovillous adenoma with 


high grade


12 point review of system is otherwise negative unless addressed in chart





Past Medical History


DM, type 2


CAD


History of bowel obstruction questionable bezoar


Arthritis lower extremity


History of smoking


Hypertension


Splenic disorder


Recent Plavix and Aspirin use


Iron deficiency anemia


Hemoccult positive


Medications





Current Medications


IV Flush (NS 3 ml) 3 ml PER PROTOCOL IV ;  Start 3/2/19 at 00:00


Ondansetron HCl (Zofran Inj) 4 mg Q6H  PRN IV NAUSEA/VOMITING;  Start 3/2/19 at 


00:00


Acetaminophen (Tylenol Tab) 650 mg Q6H  PRN PO .PAIN 1-3 OR TEMP Last 


administered on 3/2/19at 01:03; Admin Dose 650 MG;  Start 3/2/19 at 00:00


Acetaminophen/ Hydrocodone Bitart (Norco (5/325)) 1 tab Q6H  PRN PO .PAIN 4-6;  


Start 3/2/19 at 00:00


Zolpidem Tartrate (Ambien) 10 mg QHS  PRN PO .INSOMNIA Last administered on 


3/10/19at 00:14; Admin Dose 10 MG;  Start 3/2/19 at 00:00


Albuterol/ Ipratropium (Duoneb) 3 ml Q2H RESP THERAPY  PRN HHN SHORTNESS OF 


BREATH;  Start 3/2/19 at 00:00


Miscellaneous Information 1 ea NOTE XX ;  Start 3/2/19 at 00:00


Glucose (Glutose) 15 gm Q15M  PRN PO DECREASED GLUCOSE;  Start 3/2/19 at 00:00


Glucose (Glutose) 22.5 gm Q15M  PRN PO DECREASED GLUCOSE;  Start 3/2/19 at 00:00


Dextrose (D50w Syringe) 25 ml Q15M  PRN IV DECREASED GLUCOSE;  Start 3/2/19 at 


00:00


Dextrose (D50w Syringe) 50 ml Q15M  PRN IV DECREASED GLUCOSE;  Start 3/2/19 at 


00:00


Glucagon (Glucagen) 1 mg Q15M  PRN IM DECREASED GLUCOSE;  Start 3/2/19 at 00:00


Glucose (Glutose) 15 gm Q15M  PRN BUCCAL DECREASED GLUCOSE;  Start 3/2/19 at 


00:00


Insulin Aspart (Novolog Insulin Pen) NOVOLOG *MODERATE* ALGORITHM WITH MEALS  


BEDTIME SC  Last administered on 3/10/19at 12:48; Admin Dose 2 UNIT;  Start 


3/3/19 at 13:00


Hydromorphone HCl (Dilaudid) 1 mg Q6H  PRN IV SEVERE PAIN LEVEL 7-10 Last 


administered on 3/10/19at 11:08; Admin Dose 1 MG;  Start 3/4/19 at 19:00


Famotidine (Pepcid) 20 mg BID PO  Last administered on 3/10/19at 08:22; Admin 


Dose 20 MG;  Start 3/5/19 at 21:00


Hydromorphone HCl (Dilaudid) 1 mg Q6H  PRN IM SEVERE PAIN LEVEL 7-10 Last 


administered on 3/8/19at 21:15; Admin Dose 1 MG;  Start 3/8/19 at 21:00


Insulin Aspart (Novolog Insulin Pen) 8 unit WITH  MEALS SC  Last administered on


3/10/19at 12:49; Admin Dose 8 UNIT;  Start 3/9/19 at 18:00


Insulin Glargine (Lantus) 25 units DAILY@0800 SC ;  Start 3/11/19 at 08:00


Allergies:  


Coded Allergies:  


     No Known Allergy (Unverified , 3/1/19)





Past Surgical History


Exploratory laparotomy for bowel obstruction 12 years ago in [facility]





Family History


Significant Family History:  no pertinent family hx





Social History


Alcohol Use:  rarely


Smoking Status:  Current every day smoker


Drug Use:  none





Exam/Review of Systems


Exam


Vitals





Vital Signs


  Date      Temp  Pulse  Resp  B/P (MAP)   Pulse Ox  O2          O2 Flow    FiO2


Time                                                 Delivery    Rate


   3/10/19  98.1     94    16      106/60        99


     13:57                           (75)


   3/10/19                                           Room Air


     02:00








Intake and Output





3/9/19


3/9/19


3/10/19





1414:59


22:59


06:59





IntakeIntake Total


1100 ml


720 ml





BalanceBalance


1100 ml


720 ml











Constitutional:  alert, oriented; 


   No distress


Psych:  nl mood/affect; 


   No anxiety


Head:  normocephalic, atraumatic


Eyes:  nl conjunctiva, EOMI, PERRL; 


   No icteric


ENMT:  nl external ears & nose, mucosa pink and moist


Neck:  non-tender; 


   No jvd


Respiratory:  normal air movement; 


   No congested cough, No labored breathing


Cardiovascular:  regular rate and rhythm; 


   No edema


Gastrointestinal:  soft, non-tender; 


   No distended, No rebound or guarding


Genitourinary - Male:  nl scrotum


Musculoskeletal:  nl extremities to inspection, nl gait and stance; 


   No joint tenderness


Extremities:  normal pulses; 


   No calf tenderness, No edema


Neurological:  nl mental status, nl speech, nl strength


Skin:  nl turgor; 


   No rash or lesions


Lymph:  nl lymph nodes





Results


Results 24hrs





Laboratory Tests


   Test
            3/9/19
18:00  3/9/19
20:32  3/10/19
08:13  3/10/19
12:32


   Bedside Glucose         170           146            167            150








Medications


Medication





Current Medications


IV Flush (NS 3 ml) 3 ml PER PROTOCOL IV ;  Start 3/2/19 at 00:00


Ondansetron HCl (Zofran Inj) 4 mg Q6H  PRN IV NAUSEA/VOMITING;  Start 3/2/19 at 


00:00


Acetaminophen (Tylenol Tab) 650 mg Q6H  PRN PO .PAIN 1-3 OR TEMP Last 


administered on 3/2/19at 01:03; Admin Dose 650 MG;  Start 3/2/19 at 00:00


Acetaminophen/ Hydrocodone Bitart (Norco (5/325)) 1 tab Q6H  PRN PO .PAIN 4-6;  


Start 3/2/19 at 00:00


Zolpidem Tartrate (Ambien) 10 mg QHS  PRN PO .INSOMNIA Last administered on 


3/10/19at 00:14; Admin Dose 10 MG;  Start 3/2/19 at 00:00


Albuterol/ Ipratropium (Duoneb) 3 ml Q2H RESP THERAPY  PRN HHN SHORTNESS OF 


BREATH;  Start 3/2/19 at 00:00


Miscellaneous Information 1 ea NOTE XX ;  Start 3/2/19 at 00:00


Glucose (Glutose) 15 gm Q15M  PRN PO DECREASED GLUCOSE;  Start 3/2/19 at 00:00


Glucose (Glutose) 22.5 gm Q15M  PRN PO DECREASED GLUCOSE;  Start 3/2/19 at 00:00


Dextrose (D50w Syringe) 25 ml Q15M  PRN IV DECREASED GLUCOSE;  Start 3/2/19 at 


00:00


Dextrose (D50w Syringe) 50 ml Q15M  PRN IV DECREASED GLUCOSE;  Start 3/2/19 at 


00:00


Glucagon (Glucagen) 1 mg Q15M  PRN IM DECREASED GLUCOSE;  Start 3/2/19 at 00:00


Glucose (Glutose) 15 gm Q15M  PRN BUCCAL DECREASED GLUCOSE;  Start 3/2/19 at 


00:00


Insulin Aspart (Novolog Insulin Pen) NOVOLOG *MODERATE* ALGORITHM WITH MEALS  


BEDTIME SC  Last administered on 3/10/19at 12:48; Admin Dose 2 UNIT;  Start 


3/3/19 at 13:00


Hydromorphone HCl (Dilaudid) 1 mg Q6H  PRN IV SEVERE PAIN LEVEL 7-10 Last 


administered on 3/10/19at 11:08; Admin Dose 1 MG;  Start 3/4/19 at 19:00


Famotidine (Pepcid) 20 mg BID PO  Last administered on 3/10/19at 08:22; Admin 


Dose 20 MG;  Start 3/5/19 at 21:00


Hydromorphone HCl (Dilaudid) 1 mg Q6H  PRN IM SEVERE PAIN LEVEL 7-10 Last 


administered on 3/8/19at 21:15; Admin Dose 1 MG;  Start 3/8/19 at 21:00


Insulin Aspart (Novolog Insulin Pen) 8 unit WITH  MEALS SC  Last administered on


3/10/19at 12:49; Admin Dose 8 UNIT;  Start 3/9/19 at 18:00


Insulin Glargine (Lantus) 25 units DAILY@0800 SC ;  Start 3/11/19 at 08:00











LINDA MACE MD              Mar 10, 2019 16:10

## 2019-03-11 VITALS — HEART RATE: 85 BPM | SYSTOLIC BLOOD PRESSURE: 109 MMHG | RESPIRATION RATE: 18 BRPM | DIASTOLIC BLOOD PRESSURE: 63 MMHG

## 2019-03-11 VITALS — RESPIRATION RATE: 16 BRPM | HEART RATE: 87 BPM | DIASTOLIC BLOOD PRESSURE: 62 MMHG | SYSTOLIC BLOOD PRESSURE: 98 MMHG

## 2019-03-11 VITALS — SYSTOLIC BLOOD PRESSURE: 144 MMHG | HEART RATE: 67 BPM | RESPIRATION RATE: 19 BRPM | DIASTOLIC BLOOD PRESSURE: 63 MMHG

## 2019-03-11 VITALS — RESPIRATION RATE: 16 BRPM | DIASTOLIC BLOOD PRESSURE: 53 MMHG | HEART RATE: 72 BPM | SYSTOLIC BLOOD PRESSURE: 92 MMHG

## 2019-03-11 VITALS — SYSTOLIC BLOOD PRESSURE: 92 MMHG | RESPIRATION RATE: 18 BRPM | HEART RATE: 81 BPM | DIASTOLIC BLOOD PRESSURE: 50 MMHG

## 2019-03-11 LAB
ADD MAN DIFF?: NO
ANION GAP: 10 (ref 5–13)
BASOPHIL #: 0 10^3/UL (ref 0–0.1)
BASOPHILS %: 0.3 % (ref 0–2)
BLOOD UREA NITROGEN: 14 MG/DL (ref 7–20)
CALCIUM: 9.4 MG/DL (ref 8.4–10.2)
CARBON DIOXIDE: 26 MMOL/L (ref 21–31)
CHLORIDE: 102 MMOL/L (ref 97–110)
CHOL/HDL RATIO: 6.4 RATIO
CHOLESTEROL: 141 MG/DL (ref 100–200)
CREATININE: 1.14 MG/DL (ref 0.61–1.24)
EOSINOPHILS #: 0.2 10^3/UL (ref 0–0.5)
EOSINOPHILS %: 2.5 % (ref 0–7)
GLUCOSE: 217 MG/DL (ref 70–220)
HDL CHOLESTEROL: 22 MG/DL (ref 30–78)
HEMATOCRIT: 27.5 % (ref 42–52)
HEMOGLOBIN: 8.5 G/DL (ref 14–18)
IMMATURE GRANS #M: 0.04 10^3/UL (ref 0–0.03)
IMMATURE GRANS % (M): 0.6 % (ref 0–0.43)
LDL CHOLESTEROL,CALCULATED: 85 MG/DL
LYMPHOCYTES #: 2 10^3/UL (ref 0.8–2.9)
LYMPHOCYTES %: 28.7 % (ref 15–51)
MEAN CORPUSCULAR HEMOGLOBIN: 24.6 PG (ref 29–33)
MEAN CORPUSCULAR HGB CONC: 30.9 G/DL (ref 32–37)
MEAN CORPUSCULAR VOLUME: 79.7 FL (ref 82–101)
MEAN PLATELET VOLUME: 10.2 FL (ref 7.4–10.4)
MONOCYTE #: 0.5 10^3/UL (ref 0.3–0.9)
MONOCYTES %: 7.6 % (ref 0–11)
NEUTROPHIL #: 4.3 10^3/UL (ref 1.6–7.5)
NEUTROPHILS %: 60.3 % (ref 39–77)
NUCLEATED RED BLOOD CELLS #: 0 10^3/UL (ref 0–0)
NUCLEATED RED BLOOD CELLS%: 0 /100WBC (ref 0–0)
PLATELET COUNT: 338 10^3/UL (ref 140–415)
POTASSIUM: 4.6 MMOL/L (ref 3.5–5.1)
RED BLOOD COUNT: 3.45 10^6/UL (ref 4.7–6.1)
RED CELL DISTRIBUTION WIDTH: 16.5 % (ref 11.5–14.5)
SODIUM: 138 MMOL/L (ref 135–144)
TRIGLYCERIDES: 171 MG/DL (ref 0–149)
TROPONIN-I: < 0.012 NG/ML (ref 0–0.12)
TROPONIN-I: < 0.012 NG/ML (ref 0–0.12)
WHITE BLOOD COUNT: 7.1 10^3/UL (ref 4.8–10.8)

## 2019-03-11 RX ADMIN — ZOLPIDEM TARTRATE 1 MG: 5 TABLET, FILM COATED ORAL at 00:08

## 2019-03-11 RX ADMIN — INSULIN ASPART 1 UNIT: 100 INJECTION, SOLUTION INTRAVENOUS; SUBCUTANEOUS at 17:58

## 2019-03-11 RX ADMIN — FAMOTIDINE SCH MG: 20 TABLET ORAL at 20:39

## 2019-03-11 RX ADMIN — HYDROMORPHONE HYDROCHLORIDE 1 MG: 1 INJECTION, SOLUTION INTRAMUSCULAR; INTRAVENOUS; SUBCUTANEOUS at 22:34

## 2019-03-11 RX ADMIN — FAMOTIDINE 1 MG: 20 TABLET ORAL at 20:39

## 2019-03-11 RX ADMIN — HYDROMORPHONE HYDROCHLORIDE 1 MG: 1 INJECTION, SOLUTION INTRAMUSCULAR; INTRAVENOUS; SUBCUTANEOUS at 18:03

## 2019-03-11 RX ADMIN — INSULIN GLARGINE SCH UNITS: 100 INJECTION, SOLUTION SUBCUTANEOUS at 08:22

## 2019-03-11 RX ADMIN — INSULIN ASPART 1 UNIT: 100 INJECTION, SOLUTION INTRAVENOUS; SUBCUTANEOUS at 20:41

## 2019-03-11 RX ADMIN — HYDROMORPHONE HYDROCHLORIDE 1 MG: 1 INJECTION, SOLUTION INTRAMUSCULAR; INTRAVENOUS; SUBCUTANEOUS at 00:08

## 2019-03-11 RX ADMIN — POLYETHYLENE GLYCOL 3350, SODIUM SULFATE ANHYDROUS, SODIUM BICARBONATE, SODIUM CHLORIDE, POTASSIUM CHLORIDE 1 ML: 236; 22.74; 6.74; 5.86; 2.97 POWDER, FOR SOLUTION ORAL at 17:58

## 2019-03-11 RX ADMIN — HYDROMORPHONE HYDROCHLORIDE PRN MG: 1 INJECTION, SOLUTION INTRAMUSCULAR; INTRAVENOUS; SUBCUTANEOUS at 22:34

## 2019-03-11 RX ADMIN — INSULIN GLARGINE 1 UNITS: 100 INJECTION, SOLUTION SUBCUTANEOUS at 08:22

## 2019-03-11 RX ADMIN — FAMOTIDINE SCH MG: 20 TABLET ORAL at 08:17

## 2019-03-11 RX ADMIN — HYDROMORPHONE HYDROCHLORIDE PRN MG: 1 INJECTION, SOLUTION INTRAMUSCULAR; INTRAVENOUS; SUBCUTANEOUS at 18:03

## 2019-03-11 RX ADMIN — HYDROMORPHONE HYDROCHLORIDE PRN MG: 1 INJECTION, SOLUTION INTRAMUSCULAR; INTRAVENOUS; SUBCUTANEOUS at 00:08

## 2019-03-11 RX ADMIN — INSULIN ASPART 1 UNIT: 100 INJECTION, SOLUTION INTRAVENOUS; SUBCUTANEOUS at 13:32

## 2019-03-11 RX ADMIN — REGADENOSON 1 MG: 0.08 INJECTION, SOLUTION INTRAVENOUS at 12:16

## 2019-03-11 RX ADMIN — HYDROCODONE BITARTRATE AND ACETAMINOPHEN 1 TAB: 5; 325 TABLET ORAL at 16:35

## 2019-03-11 RX ADMIN — HYDROMORPHONE HYDROCHLORIDE PRN MG: 1 INJECTION, SOLUTION INTRAMUSCULAR; INTRAVENOUS; SUBCUTANEOUS at 13:38

## 2019-03-11 RX ADMIN — ZOLPIDEM TARTRATE PRN MG: 5 TABLET, FILM COATED ORAL at 00:08

## 2019-03-11 RX ADMIN — INSULIN ASPART 1 UNIT: 100 INJECTION, SOLUTION INTRAVENOUS; SUBCUTANEOUS at 08:20

## 2019-03-11 RX ADMIN — INSULIN ASPART 1 UNIT: 100 INJECTION, SOLUTION INTRAVENOUS; SUBCUTANEOUS at 13:34

## 2019-03-11 RX ADMIN — HYDROMORPHONE HYDROCHLORIDE 1 MG: 1 INJECTION, SOLUTION INTRAMUSCULAR; INTRAVENOUS; SUBCUTANEOUS at 13:38

## 2019-03-11 RX ADMIN — INSULIN ASPART 1 UNIT: 100 INJECTION, SOLUTION INTRAVENOUS; SUBCUTANEOUS at 08:00

## 2019-03-11 RX ADMIN — FAMOTIDINE 1 MG: 20 TABLET ORAL at 08:17

## 2019-03-11 NOTE — PN
Date/Time of Note


Date/Time of Note


DATE: 3/11/19 


TIME: 16:49





Assessment/Plan


Lines/Catheters


IV Catheter Type (from [facility]):  Saline Lock


Simpson in Place (from [facility]):  No





Assessment/Plan


Chief Complaint/Hosp Course


1.  Transverse colon adenocarcinoma on CT distal however on colonoscopy and CT 


from [place] proximal.  The mass was tattooed.  Shotty lymph nodes.  Previous 


surgeon unable to coordinate surgery therefore a secondary consult was obtained.


 Patient with history of ex lap due to bowel obstruction will make this more 


difficult operation.


-Medical/cardiac optimization in process


-Surgical resection tomorrow afternoon if patient cleared to proceed


-Gennaro today


2.  40 pound weight loss secondary to above


3.  Diabetes mellitus type 2


-Nutrition medication optimization


4.  Hypertension


-Nutrition medication optimization


5.  Coronary artery disease with history of Plavix and aspirin


-Cardiac evaluation and optimization


6.  Iron deficiency anemia secondary to above


-Iron transfusion per hematology


-As above





Thank you,





Subjective


24 Hr Interval Summary


Min pain.  Difficult bowel functions.  No cp/sob.  No cough.  No significant 


bloating.  No ha/visual or neuro changes.  No dysuria.  No cough.  No sz.  


Cardiac w/u in process.





Exam/Review of Systems


Vital Signs


Vitals





Vital Signs


  Date      Temp  Pulse  Resp  B/P (MAP)   Pulse Ox  O2          O2 Flow    FiO2


Time                                                 Delivery    Rate


   3/11/19  98.4     87    16  98/62 (74)       100


     13:51


   3/10/19                                           Room Air


     02:00








Intake and Output





3/10/19


3/10/19


3/11/19





1515:00


23:00


07:00





IntakeIntake Total


1160 ml


320 ml


800 ml





BalanceBalance


1160 ml


320 ml


800 ml














Exam


Free Text/Dictation


Constitutional:  alert, oriented; 


   No distress


Psych:  nl mood/affect; 


   No anxiety


Head:  normocephalic, atraumatic


Eyes:  nl conjunctiva, EOMI, PERRL; 


   No icteric


ENMT:  nl external ears & nose, mucosa pink and moist


Neck:  non-tender; 


   No jvd


Respiratory:  normal air movement; 


   No congested cough, No labored breathing


Cardiovascular:  regular rate and rhythm; 


   No edema


Gastrointestinal:  soft, non-tender; 


   No distended, No rebound or guarding


Genitourinary - Male:  nl scrotum


Musculoskeletal:  nl extremities to inspection, nl gait and stance; 


   No joint tenderness


Extremities:  normal pulses; 


   No calf tenderness, No edema


Neurological:  nl mental status, nl speech, nl strength


Skin:  nl turgor; 


   No rash or lesions


Lymph:  nl lymph nodes





Results


Result Diagram:  


3/11/19 0509                                                                    


           3/11/19 0509














LINDA MACE MD              Mar 11, 2019 16:51

## 2019-03-11 NOTE — PN
Date/Time of Note


Date/Time of Note


DATE: 3/11/19 


TIME: 15:33





Assessment/Plan


VTE Prophylaxis


Risk score (from Ns)>0 risk:  1


SCD applied (from [facility]):  Yes


Pharmacological prophylaxis:  NA/contraindicated


Pharm contraindication:  surgical contra





Lines/Catheters


IV Catheter Type (from [facility]):  Saline Lock


Urinary Cath still in place:  No





Assessment/Plan


Hospital Course


57 yo male with DM who presented with hematochezia.  Workup has revealed colon 


adenocarcinoma





Colon adenocarcinoma:


- Bone scan negative for mets


- s/p colonoscopy with path showing adenocarcinoma


- Dr Mcintyre following


- General surgery Dr Dugan has agreed to take the case (Dr Bullard out of town)


- Patient has no home to go to, planning to return to [facility]





Iron deficiency anemia:


- IV iron





Diabetes


- Basal/bolus insulin





CKD II:


- stable





Prophylaxis: SCDs





DC planning: Pending surgical management plan of colon cancer. He has moved out 


of his home and planning to fly to [place] when discharged.  Patient requires 


surgery prior to DC


Result Diagram:  


3/11/19 0509                                                                    


           3/11/19 0509





Results 24hrs





Laboratory Tests


Test
                 3/10/19
17:40  3/10/19
18:22  3/10/19
20:53  3/11/19
01:01


Bedside Glucose               206                            76


Troponin I                           < 0.012                       < 0.012


Test
                 3/11/19
05:09  3/11/19
07:47  3/11/19
13:26  



White Blood Count             7.1


Red Blood Count             3.45  L


Hemoglobin                   8.5  L


Hematocrit                  27.5  L


Mean Corpuscular            79.7  L


Volume


Mean Corpuscular            24.6  L


Hemoglobin


Mean Corpuscular           30.9  L
  
              
              



Hemoglobin
Concent


Red Cell                    16.5  H


Distribution Width


Platelet Count                338


Mean Platelet Volume         10.2


Immature                   0.600  H


Granulocytes %


Neutrophils %                60.3


Lymphocytes %                28.7


Monocytes %                   7.6


Eosinophils %                 2.5


Basophils %                   0.3


Nucleated Red Blood           0.0


Cells %


Immature                   0.040  H


Granulocytes #


Neutrophils #                 4.3


Lymphocytes #                 2.0


Monocytes #                   0.5


Eosinophils #                 0.2


Basophils #                   0.0


Nucleated Red Blood           0.0


Cells #


Sodium Level                  138


Potassium Level               4.6


Chloride Level                102


Carbon Dioxide Level           26


Anion Gap                      10


Blood Urea Nitrogen            14


Creatinine                   1.14


Est Glomerular        > 60  
        
              
              



Filtrat Rate
mL/min


Glucose Level                 217


Calcium Level                 9.4


Troponin I            < 0.012


Triglycerides Level          171  H


Cholesterol Level             141


LDL Cholesterol,               85


Calculated


HDL Cholesterol               22  L


Cholesterol/HDL               6.4


Ratio


Bedside Glucose                              192            117








Subjective


24 Hr Interval Summary


Constitutional:  no complaints





Exam/Review of Systems


Exam


Vitals





Vital Signs


  Date      Temp  Pulse  Resp  B/P (MAP)   Pulse Ox  O2          O2 Flow    FiO2


Time                                                 Delivery    Rate


   3/11/19  98.4     87    16  98/62 (74)       100


     13:51


   3/10/19                                           Room Air


     02:00








Intake and Output





3/10/19


3/10/19


3/11/19





1515:00


23:00


07:00





IntakeIntake Total


1160 ml


320 ml


800 ml





BalanceBalance


1160 ml


320 ml


800 ml











Constitutional:  alert, oriented


Respiratory:  clear to auscultation


Cardiovascular:  regular rate and rhythm


Gastrointestinal:  soft; 


   No distended


Musculoskeletal:  nl extremities to inspection





Results


Results 24hrs





Laboratory Tests


Test
                 3/10/19
17:40  3/10/19
18:22  3/10/19
20:53  3/11/19
01:01


Bedside Glucose               206                            76


Troponin I                           < 0.012                       < 0.012


Test
                 3/11/19
05:09  3/11/19
07:47  3/11/19
13:26  



White Blood Count             7.1


Red Blood Count             3.45  L


Hemoglobin                   8.5  L


Hematocrit                  27.5  L


Mean Corpuscular            79.7  L


Volume


Mean Corpuscular            24.6  L


Hemoglobin


Mean Corpuscular           30.9  L
  
              
              



Hemoglobin
Concent


Red Cell                    16.5  H


Distribution Width


Platelet Count                338


Mean Platelet Volume         10.2


Immature                   0.600  H


Granulocytes %


Neutrophils %                60.3


Lymphocytes %                28.7


Monocytes %                   7.6


Eosinophils %                 2.5


Basophils %                   0.3


Nucleated Red Blood           0.0


Cells %


Immature                   0.040  H


Granulocytes #


Neutrophils #                 4.3


Lymphocytes #                 2.0


Monocytes #                   0.5


Eosinophils #                 0.2


Basophils #                   0.0


Nucleated Red Blood           0.0


Cells #


Sodium Level                  138


Potassium Level               4.6


Chloride Level                102


Carbon Dioxide Level           26


Anion Gap                      10


Blood Urea Nitrogen            14


Creatinine                   1.14


Est Glomerular        > 60  
        
              
              



Filtrat Rate
mL/min


Glucose Level                 217


Calcium Level                 9.4


Troponin I            < 0.012


Triglycerides Level          171  H


Cholesterol Level             141


LDL Cholesterol,               85


Calculated


HDL Cholesterol               22  L


Cholesterol/HDL               6.4


Ratio


Bedside Glucose                              192            117








Medications


Medication





Current Medications


IV Flush (NS 3 ml) 3 ml PER PROTOCOL IV ;  Start 3/2/19 at 00:00


Ondansetron HCl (Zofran Inj) 4 mg Q6H  PRN IV NAUSEA/VOMITING;  Start 3/2/19 at 


00:00


Acetaminophen (Tylenol Tab) 650 mg Q6H  PRN PO .PAIN 1-3 OR TEMP Last 


administered on 3/2/19at 01:03; Admin Dose 650 MG;  Start 3/2/19 at 00:00


Acetaminophen/ Hydrocodone Bitart (Norco (5/325)) 1 tab Q6H  PRN PO .PAIN 4-6;  


Start 3/2/19 at 00:00


Zolpidem Tartrate (Ambien) 10 mg QHS  PRN PO .INSOMNIA Last administered on 


3/11/19at 00:08; Admin Dose 10 MG;  Start 3/2/19 at 00:00


Albuterol/ Ipratropium (Duoneb) 3 ml Q2H RESP THERAPY  PRN HHN SHORTNESS OF 


BREATH;  Start 3/2/19 at 00:00


Miscellaneous Information 1 ea NOTE XX ;  Start 3/2/19 at 00:00


Glucose (Glutose) 15 gm Q15M  PRN PO DECREASED GLUCOSE;  Start 3/2/19 at 00:00


Glucose (Glutose) 22.5 gm Q15M  PRN PO DECREASED GLUCOSE;  Start 3/2/19 at 00:00


Dextrose (D50w Syringe) 25 ml Q15M  PRN IV DECREASED GLUCOSE;  Start 3/2/19 at 


00:00


Dextrose (D50w Syringe) 50 ml Q15M  PRN IV DECREASED GLUCOSE;  Start 3/2/19 at 


00:00


Glucagon (Glucagen) 1 mg Q15M  PRN IM DECREASED GLUCOSE;  Start 3/2/19 at 00:00


Glucose (Glutose) 15 gm Q15M  PRN BUCCAL DECREASED GLUCOSE;  Start 3/2/19 at 


00:00


Insulin Aspart (Novolog Insulin Pen) NOVOLOG *MODERATE* ALGORITHM WITH MEALS  


BEDTIME SC  Last administered on 3/11/19at 08:20; Admin Dose 4 UNIT;  Start 


3/3/19 at 13:00


Hydromorphone HCl (Dilaudid) 1 mg Q6H  PRN IV SEVERE PAIN LEVEL 7-10 Last 


administered on 3/11/19at 13:38; Admin Dose 1 MG;  Start 3/4/19 at 19:00


Famotidine (Pepcid) 20 mg BID PO  Last administered on 3/10/19at 20:54; Admin 


Dose 20 MG;  Start 3/5/19 at 21:00


Hydromorphone HCl (Dilaudid) 1 mg Q6H  PRN IM SEVERE PAIN LEVEL 7-10 Last 


administered on 3/8/19at 21:15; Admin Dose 1 MG;  Start 3/8/19 at 21:00


Insulin Aspart (Novolog Insulin Pen) 8 unit WITH  MEALS SC  Last administered on


3/11/19at 13:34; Admin Dose 8 UNIT;  Start 3/9/19 at 18:00


Insulin Glargine (Lantus) 25 units DAILY@0800 SC  Last administered on 3/11/19at


08:22; Admin Dose 25 UNITS;  Start 3/11/19 at 08:00











FELICIA MELENDEZ                  Mar 11, 2019 15:34

## 2019-03-11 NOTE — RADRPT
Echocardiogram Report

 

 

Patient Name: MILADIS ZARAGOZAatient ID: [id number]

: 1960 (58y 6m)Study Date: 2019 10:55:56 AM

Gender: MAccession #: [id number]

Tech: DANY Gaxiola [facility]                       Location: [facility]         

Ref.Physician: SHERINE GUTHRIE            Height(Cm):            

 

BSA: Weight(Kg):

Quality: AdequateAccount #:

 

 

Procedures:

Echocardiographic Report:

Transthoracic echocardiogram with complete 2D, M-Mode, and doppler 

examination.

 

Indications:

Pre-op.

 

 

Measurements:

2D/M Mode                                   Doppler                                               

Measurement    Value    Normal Range        Measurement      Value    Normal Range                

LVIDd 2D       4.6      [ 4.2 - 5.8 ] cm    AV Peak Derrick      1.5      [ 100.0 - 170.0 ] cm/sec    

LVIDs 2D       2.6      [ 2.5 - 4.0 ] cm    AV Peak PG       9.0      [ 2.0 - 9.0 ] mmHg          

LVPWd 2D       1.3      [ 0.6 - 1.0 ] cm    LVOT Peak Derrick    1.1      [ 70.0 - 110.0 ] cm/sec     

IVSd 2D        1.2      [ 0.6 - 1.0 ] cm    LVOT Peak PG     5.0      [ 2.0 - 6.0 ] mmHg          

IVS/LVPW 2D    0.9      ratio               MV E Peak Derrick    0.8      [ 60.0 - 130.0 ] cm/sec     

AoR Diam 2D    3.3      [ 2.6 - 3.4 ] cm    MV A Peak Derrick    1.0      [ 100.0 - 120.0 ] cm/sec    

LA/Ao 2D       1        ratio               MV E/A           0.8      [ 0.8 - 1.5 ] ratio         

LA Dimen 2D    3.2      [ 3.0 - 4.0 ] cm    MV Decel Time    229      [ 104 - 258 ] msec          

                                            Lat E` Derrick       0.1      [ 10.0 - 15.0 ] cm/sec      

                                            MV E/A           0.8      [ 0.8 - 1.5 ] ratio         

                                            TR Peak Derrick      1.9      [ 100.0 - 280.0 ] cm/sec    

                                            TR Peak PG       14.0     mmHg                        

                                            RVSP             17.0     [ 10.0 - 36.0 ] mmHg        

                                            RA Pressure      3.0      mmHg                        

 

Findings:

Left Ventricle:

Normal left ventricular systolic function. Normal left ventricular 

cavity size. Mild concentric left ventricular hypertrophy. Ejection 

fraction is visually estimated at 55-60 %. Tissue Doppler/Mitral Doppler 

indices are consistent with impaired relaxation (Stage I diastolic 

dysfunction).

Right Ventricle:

Normal right ventricular size. Normal right ventricular systolic 

function.

Left Atrium:

The left atrium is normal in size.

Right Atrium:

The right atrium is normal in size.

Mitral Valve:

Mitral valve leaflets appear mildly thickened. Mild mitral annular 

calcification. Trace mitral regurgitation.

Aortic Valve:

Normal appearance of the aortic valve. No significant aortic stenosis or 

insufficiency.

Tricuspid Valve:

Normal appearance of the tricuspid valve. Estimated peak PA systolic 

pressure 17 mmHg. There is trace tricuspid regurgitation.

Pulmonic Valve:

Normal pulmonic valve appearance.

Pericardium:

Normal pericardium with no significant pericardial effusion.

Aorta:

Normal aortic root.

IVC:

Normal size and normal respiratory collapse consistent with normal right 

atrial pressure.

 

 

Conclusions:

 Normal left ventricular systolic function. Normal left ventricular 

cavity size. Mild concentric left ventricular hypertrophy. Ejection 

fraction is visually estimated at 55-60 %. Tissue Doppler/Mitral Doppler 

indices are consistent with impaired relaxation (Stage I diastolic 

dysfunction).

 Mitral valve leaflets appear mildly thickened. Mild mitral annular 

calcification. Trace mitral regurgitation.

 Normal appearance of the tricuspid valve. Estimated peak PA systolic 

pressure 17 mmHg. There is trace tricuspid regurgitation.

 

Electronically Signed By:

 

Sherine Guthrie

2019 17:23:27 PDT

## 2019-03-11 NOTE — CONS
Assessment/Plan


Assessment/Plan


Hospital Course (Demo Recall)


IMPRESSION:


1.  Preoperative evaluation in a patient prior to colectomy that has multiple 


cardiac risk factors and possible prior stent apparently placed approximately 2 


to 3 years prior.-neg trop x 2


2.  Hypotension, borderline.-ongoing today


3.  History of possible prior stent placements 2 to 3 years prior per chart 


biopsy.


4.  Dyslipidemia.


5.  Adenocarcinoma of the colon, newly diagnosed.


6.  Abdominal pain secondary to adenocarcinoma of the colon.


7.  Diabetes mellitus.


8.  Anemia.


9.  Renal failure insufficiency.





Recc:


-Preop lexiscan today


-Contiue all othr supportive/medical care





Consultation Date/Type/Reason


Admit Date/Time


Mar 1, 2019 at 20:38


Initial Consult Date


3/10/19


Type of Consult


Cardiology


Reason for Consultation


preop


Requesting Provider:  HANNAH DOWNING MD


Date/Time of Note


DATE: 3/11/19 


TIME: 12:10





Exam/Review of Systems


Vital Signs


Vitals





Vital Signs


  Date      Temp  Pulse  Resp  B/P (MAP)   Pulse Ox  O2          O2 Flow    FiO2


Time                                                 Delivery    Rate


   3/11/19  98.5     72    16  92/53 (66)        99


     07:23


   3/10/19                                           Room Air


     02:00








Intake and Output





3/10/19


3/10/19


3/11/19





1414:59


22:59


06:59





IntakeIntake Total


920 ml


560 ml


800 ml





BalanceBalance


920 ml


560 ml


800 ml














Exam


Exam


Review of Systems:


CONSTITUTIONAL:  No fevers, chills.


PULMONARY:  No sob


CARDIOVASCULAR: No chest pain/palpitations


GASTROINTESTINAL:  No nausea/vomiting.


GENITOURINARY:  No hematuria/dysuria.


MUSCULOSKELETAL:  No myagias/arthalgias.


PSYCHIATRIC:  The patient denies depression.


NEUROLOGIC:   No weakness


Psych:  no complaints


Head:  normocephalic


ENMT:  mucosa pink and moist


Neck:  supple, jvd


Respiratory:  diminished breath sounds


Cardiovascular:  regular rate and rhythm


Gastrointestinal:  soft, non-tender


Musculoskeletal:  muscle tone (normal)


Extremities:  edema (none)


Neurological:  other (No focal deficits)





Labs


Result Diagram:  


3/11/19 0509                                                                    


           3/11/19 0509





Results 24hrs





Laboratory Tests


Test
                 3/10/19
12:32  3/10/19
17:40  3/10/19
18:22  3/10/19
20:53


Bedside Glucose               150            206                            76


Troponin I                                          < 0.012


Test
                 3/11/19
01:01  3/11/19
05:09  3/11/19
07:47  



Troponin I            < 0.012        < 0.012


White Blood Count                            7.1


Red Blood Count                            3.45  L


Hemoglobin                                  8.5  L


Hematocrit                                 27.5  L


Mean Corpuscular                           79.7  L


Volume


Mean Corpuscular                           24.6  L


Hemoglobin


Mean Corpuscular      
                   30.9  L
  
              



Hemoglobin
Concent


Red Cell                                   16.5  H


Distribution Width


Platelet Count                               338


Mean Platelet Volume                        10.2


Immature                                  0.600  H


Granulocytes %


Neutrophils %                               60.3


Lymphocytes %                               28.7


Monocytes %                                  7.6


Eosinophils %                                2.5


Basophils %                                  0.3


Nucleated Red Blood                          0.0


Cells %


Immature                                  0.040  H


Granulocytes #


Neutrophils #                                4.3


Lymphocytes #                                2.0


Monocytes #                                  0.5


Eosinophils #                                0.2


Basophils #                                  0.0


Nucleated Red Blood                          0.0


Cells #


Sodium Level                                 138


Potassium Level                              4.6


Chloride Level                               102


Carbon Dioxide Level                          26


Anion Gap                                     10


Blood Urea Nitrogen                           14


Creatinine                                  1.14


Est Glomerular        
              > 60  
        
              



Filtrat Rate
mL/min


Glucose Level                                217


Calcium Level                                9.4


Triglycerides Level                         171  H


Cholesterol Level                            141


LDL Cholesterol,                              85


Calculated


HDL Cholesterol                              22  L


Cholesterol/HDL                              6.4


Ratio


Bedside Glucose                                             192








Medications


Medications





Current Medications


IV Flush (NS 3 ml) 3 ml PER PROTOCOL IV ;  Start 3/2/19 at 00:00


Ondansetron HCl (Zofran Inj) 4 mg Q6H  PRN IV NAUSEA/VOMITING;  Start 3/2/19 at 


00:00


Acetaminophen (Tylenol Tab) 650 mg Q6H  PRN PO .PAIN 1-3 OR TEMP Last 


administered on 3/2/19at 01:03; Admin Dose 650 MG;  Start 3/2/19 at 00:00


Acetaminophen/ Hydrocodone Bitart (Norco (5/325)) 1 tab Q6H  PRN PO .PAIN 4-6;  


Start 3/2/19 at 00:00


Zolpidem Tartrate (Ambien) 10 mg QHS  PRN PO .INSOMNIA Last administered on 


3/11/19at 00:08; Admin Dose 10 MG;  Start 3/2/19 at 00:00


Albuterol/ Ipratropium (Duoneb) 3 ml Q2H RESP THERAPY  PRN HHN SHORTNESS OF 


BREATH;  Start 3/2/19 at 00:00


Miscellaneous Information 1 ea NOTE XX ;  Start 3/2/19 at 00:00


Glucose (Glutose) 15 gm Q15M  PRN PO DECREASED GLUCOSE;  Start 3/2/19 at 00:00


Glucose (Glutose) 22.5 gm Q15M  PRN PO DECREASED GLUCOSE;  Start 3/2/19 at 00:00


Dextrose (D50w Syringe) 25 ml Q15M  PRN IV DECREASED GLUCOSE;  Start 3/2/19 at 0


0:00


Dextrose (D50w Syringe) 50 ml Q15M  PRN IV DECREASED GLUCOSE;  Start 3/2/19 at 


00:00


Glucagon (Glucagen) 1 mg Q15M  PRN IM DECREASED GLUCOSE;  Start 3/2/19 at 00:00


Glucose (Glutose) 15 gm Q15M  PRN BUCCAL DECREASED GLUCOSE;  Start 3/2/19 at 


00:00


Insulin Aspart (Novolog Insulin Pen) NOVOLOG *MODERATE* ALGORITHM WITH MEALS  


BEDTIME SC  Last administered on 3/11/19at 08:20; Admin Dose 4 UNIT;  Start 


3/3/19 at 13:00


Hydromorphone HCl (Dilaudid) 1 mg Q6H  PRN IV SEVERE PAIN LEVEL 7-10 Last adm


inistered on 3/11/19at 00:08; Admin Dose 1 MG;  Start 3/4/19 at 19:00


Famotidine (Pepcid) 20 mg BID PO  Last administered on 3/10/19at 20:54; Admin 


Dose 20 MG;  Start 3/5/19 at 21:00


Hydromorphone HCl (Dilaudid) 1 mg Q6H  PRN IM SEVERE PAIN LEVEL 7-10 Last 


administered on 3/8/19at 21:15; Admin Dose 1 MG;  Start 3/8/19 at 21:00


Insulin Aspart (Novolog Insulin Pen) 8 unit WITH  MEALS SC  Last administered on


3/10/19at 18:18; Admin Dose 8 UNIT;  Start 3/9/19 at 18:00


Insulin Glargine (Lantus) 25 units DAILY@0800 SC  Last administered on 3/11/19at


08:22; Admin Dose 25 UNITS;  Start 3/11/19 at 08:00











SHERINE GOLDSMITH 11, 2019 12:12

## 2019-03-11 NOTE — PREAC
Date/Time of Note


Date/Time of Note


DATE: 3/11/19 


TIME: 17:34





Anesthesia Eval and Record


Evaluation


Time Pre-Procedure Interview


DATE: 3/11/19 


TIME: 17:34


Age


58


Sex


male


NPO:  8 hrs


Preoperative diagnosis


COLON CA


Planned procedure


LAPAROSCOPIC PARTIAL COLECTMY





Past Medical History


Past Medical History:  Includes


Cardio:  HTN, Dyslipidemia, CAD, PTCA/Stent


Endo:  Diabetes


Pulm:  Smoking Hx





Surgery & Anesthesia Issues


No known issue





Meds


Anticoagulation:  No


Beta Blocker within 24 hr:  No


Reason Beta Blocker not given:  Pt. not on B-Blocker





Current Medications


IV Flush (NS 3 ml) 3 ml PER PROTOCOL IV ;  Start 3/2/19 at 00:00


Ondansetron HCl (Zofran Inj) 4 mg Q6H  PRN IV NAUSEA/VOMITING;  Start 3/2/19 at 


00:00


Acetaminophen (Tylenol Tab) 650 mg Q6H  PRN PO .PAIN 1-3 OR TEMP Last administ


ered on 3/2/19at 01:03; Admin Dose 650 MG;  Start 3/2/19 at 00:00


Acetaminophen/ Hydrocodone Bitart (Norco (5/325)) 1 tab Q6H  PRN PO .PAIN 4-6;  


Start 3/2/19 at 00:00


Zolpidem Tartrate (Ambien) 10 mg QHS  PRN PO .INSOMNIA Last administered on 


3/11/19at 00:08; Admin Dose 10 MG;  Start 3/2/19 at 00:00


Albuterol/ Ipratropium (Duoneb) 3 ml Q2H RESP THERAPY  PRN HHN SHORTNESS OF 


BREATH;  Start 3/2/19 at 00:00


Miscellaneous Information 1 ea NOTE XX ;  Start 3/2/19 at 00:00


Glucose (Glutose) 15 gm Q15M  PRN PO DECREASED GLUCOSE;  Start 3/2/19 at 00:00


Glucose (Glutose) 22.5 gm Q15M  PRN PO DECREASED GLUCOSE;  Start 3/2/19 at 00:00


Dextrose (D50w Syringe) 25 ml Q15M  PRN IV DECREASED GLUCOSE;  Start 3/2/19 at 


00:00


Dextrose (D50w Syringe) 50 ml Q15M  PRN IV DECREASED GLUCOSE;  Start 3/2/19 at 


00:00


Glucagon (Glucagen) 1 mg Q15M  PRN IM DECREASED GLUCOSE;  Start 3/2/19 at 00:00


Glucose (Glutose) 15 gm Q15M  PRN BUCCAL DECREASED GLUCOSE;  Start 3/2/19 at 


00:00


Insulin Aspart (Novolog Insulin Pen) NOVOLOG *MODERATE* ALGORITHM WITH MEALS  


BEDTIME SC  Last administered on 3/11/19at 08:20; Admin Dose 4 UNIT;  Start 


3/3/19 at 13:00


Famotidine (Pepcid) 20 mg BID PO  Last administered on 3/10/19at 20:54; Admin 


Dose 20 MG;  Start 3/5/19 at 21:00


Insulin Aspart (Novolog Insulin Pen) 8 unit WITH  MEALS SC  Last administered on


3/11/19at 13:34; Admin Dose 8 UNIT;  Start 3/9/19 at 18:00


Insulin Glargine (Lantus) 25 units DAILY@0800 SC  Last administered on 3/11/19at


08:22; Admin Dose 25 UNITS;  Start 3/11/19 at 08:00


Hydromorphone HCl (Dilaudid) 1 mg Q4H  PRN IV SEVERE PAIN LEVEL 7-10;  Start 3


/11/19 at 17:00


Meds reviewed:  Yes





Allergies


Coded Allergies:  


     No Known Allergy (Unverified , 3/1/19)


Allergies Reviewed:  Yes





Labs/Studies


Labs Reviewed:  Reviewed by anesthesiologist


Result Diagram:  


3/11/19 0509                                                                    


           3/11/19 0509





Laboratory Tests


3/11/19 05:09








Pregnancy test:  N/A


Studies:  ECG (SR), Other ( No Lexiscan-induced ST or T-wave changes from 


baseline abnormalities or diagnostic cardiac ischemia.)





Pre-procedure Exam


Last vitals





Vital Signs


  Date      Temp  Pulse  Resp  B/P (MAP)   Pulse Ox  O2          O2 Flow    FiO2


Time                                                 Delivery    Rate


   3/11/19  98.4     87    16  98/62 (74)       100


     13:51


   3/10/19                                           Room Air


     02:00





Airway:  Adequate mouth opening


Mallampati:  Mallampati II


Teeth:  Normal


Lung:  Normal


Heart:  Normal





ASA Physical Status


ASA physical status:  3


Emergency:  None





Planned Anesthetic


General/MAC:  ETT





Pre-operative Attestations


Prior to commencing anesthesia and surgery, the patient was re-evaluated, there 


was verification of:


*The patient's identity


*The results of appropriate recent lab work and preoperative vital signs


*The above evaluation not changing prior to induction


*Anesthetic plan, risk benefits, alternative and complications discussed with 


patient/family; questions answered; patient/family understands, accepts and 


wishes to proceed.











REBEKAH NAVA                  Mar 11, 2019 17:34

## 2019-03-11 NOTE — CARRPT
DATE OF PROCEDURE:  03/11/2019

 

 

REASON FOR STRESS TESTING:  Preoperative.

 

BASELINE VITAL SIGNS AND ELECTROCARDIOGRAM:  Pulse 71, blood pressure 90/60.  Electrocardiogram was n
ormal sinus rhythm, rate of 71, normal axis and intervals with T-wave inversion isolated to lead aVL.


 

PROCEDURE:  The patient underwent standard Lexiscan infusion protocol over 10 seconds followed by rad
iotracer.  The patient's test was stopped at the completion of protocol.  Maximal achieved blood pres
sure during the test 92/57.  Maximum heart rate during the test 104.

 

ELECTROCARDIOGRAM FINDINGS:  The patient does not have any new Lexiscan-induced ST or T-wave changes 
from baseline abnormalities.  No documented PVCs.

 

SYMPTOMS:  The patient had no complaints of chest pain or shortness of breath during the stress testi
ng.

 

IMPRESSION:

1.  No Lexiscan-induced ST or T-wave changes from baseline abnormalities or diagnostic cardiac ischem
ia.

2.  No complaints of chest pain or shortness of breath during the stress test.

3.  No documented premature ventricular contractions during the stress test.

4.  Report of nuclear images to follow in separate dictation.

 

 

Dictated By: SHERINE OVIEDO/JAROD

DD:    03/11/2019 12:20:48

DT:    03/11/2019 16:47:54

Conf#: [id number]

DID#:  [id number]

CC: MIREILLE JACK; HANNAH DOWNING MD;*[facility]*

## 2019-03-11 NOTE — CONS
Assessment/Plan


Assessment/Plan


Hospital Course (Demo Recall)


57 yo with newly diagnosed colon adenoca


-CT chest shows 4 mm noncalcified nodule in the right apex, which is 


nonspecific. 


-CT AP: Irregular bowel wall thickening / mass with adjacent stranding of the 


distal transverse colon, compatible with given history of colon cancer. Shotty 


periaortic lymph nodes. The largest is an 11 mm node with normal fatty hilum, 


suggestive of reactive etiology.  Small nonspecific peripherally sclerotic 


lesion in the left iliac bone measuring 6 mm.


-bone scan negative for mets


-await final assessment from surgery on when surgery will be scheduled. Dr Dugan is now seeing the patient and planning underway for surgery


-cont IV iron for iron deficiency anemia


-pt undergoing cardiac clearance w/u prior to surgery





Consultation Date/Type/Reason


Admit Date/Time


Mar 1, 2019 at 20:38


Initial Consult Date


3/6/19


Requesting Provider:  HANNAH DOWNING MD


Date/Time of Note


DATE: 3/11/19 


TIME: 14:05





24 HR Interval Summary


Constitutional:  improved





Exam/Review of Systems


Exam


Vitals





Vital Signs


  Date      Temp  Pulse  Resp  B/P (MAP)   Pulse Ox  O2          O2 Flow    FiO2


Time                                                 Delivery    Rate


   3/11/19  98.4     87    16  98/62 (74)       100


     13:51


   3/10/19                                           Room Air


     02:00








Intake and Output





3/10/19


3/10/19


3/11/19





1414:59


22:59


06:59





IntakeIntake Total


920 ml


560 ml


800 ml





BalanceBalance


920 ml


560 ml


800 ml











Constitutional:  alert, oriented, well developed


Psych:  no complaints, nl mood/affect


Head:  normocephalic, atraumatic


Eyes:  nl conjunctiva, EOMI, nl lids, nl sclera, PERRL


Respiratory:  normal air movement


Musculoskeletal:  nl extremities to inspection





Results


Result Diagram:  


3/11/19 0509                                                                    


           3/11/19 0509





Results 24hrs





Laboratory Tests


Test
                 3/10/19
17:40  3/10/19
18:22  3/10/19
20:53  3/11/19
01:01


Bedside Glucose               206                            76


Troponin I                           < 0.012                       < 0.012


Test
                 3/11/19
05:09  3/11/19
07:47  3/11/19
13:26  



White Blood Count             7.1


Red Blood Count             3.45  L


Hemoglobin                   8.5  L


Hematocrit                  27.5  L


Mean Corpuscular            79.7  L


Volume


Mean Corpuscular            24.6  L


Hemoglobin


Mean Corpuscular           30.9  L
  
              
              



Hemoglobin
Concent


Red Cell                    16.5  H


Distribution Width


Platelet Count                338


Mean Platelet Volume         10.2


Immature                   0.600  H


Granulocytes %


Neutrophils %                60.3


Lymphocytes %                28.7


Monocytes %                   7.6


Eosinophils %                 2.5


Basophils %                   0.3


Nucleated Red Blood           0.0


Cells %


Immature                   0.040  H


Granulocytes #


Neutrophils #                 4.3


Lymphocytes #                 2.0


Monocytes #                   0.5


Eosinophils #                 0.2


Basophils #                   0.0


Nucleated Red Blood           0.0


Cells #


Sodium Level                  138


Potassium Level               4.6


Chloride Level                102


Carbon Dioxide Level           26


Anion Gap                      10


Blood Urea Nitrogen            14


Creatinine                   1.14


Est Glomerular        > 60  
        
              
              



Filtrat Rate
mL/min


Glucose Level                 217


Calcium Level                 9.4


Troponin I            < 0.012


Triglycerides Level          171  H


Cholesterol Level             141


LDL Cholesterol,               85


Calculated


HDL Cholesterol               22  L


Cholesterol/HDL               6.4


Ratio


Bedside Glucose                              192            117








Medications


Medication





Current Medications


IV Flush (NS 3 ml) 3 ml PER PROTOCOL IV ;  Start 3/2/19 at 00:00


Ondansetron HCl (Zofran Inj) 4 mg Q6H  PRN IV NAUSEA/VOMITING;  Start 3/2/19 at 


00:00


Acetaminophen (Tylenol Tab) 650 mg Q6H  PRN PO .PAIN 1-3 OR TEMP Last 


administered on 3/2/19at 01:03; Admin Dose 650 MG;  Start 3/2/19 at 00:00


Acetaminophen/ Hydrocodone Bitart (Norco (5/325)) 1 tab Q6H  PRN PO .PAIN 4-6;  


Start 3/2/19 at 00:00


Zolpidem Tartrate (Ambien) 10 mg QHS  PRN PO .INSOMNIA Last administered on 


3/11/19at 00:08; Admin Dose 10 MG;  Start 3/2/19 at 00:00


Albuterol/ Ipratropium (Duoneb) 3 ml Q2H RESP THERAPY  PRN HHN SHORTNESS OF 


BREATH;  Start 3/2/19 at 00:00


Miscellaneous Information 1 ea NOTE XX ;  Start 3/2/19 at 00:00


Glucose (Glutose) 15 gm Q15M  PRN PO DECREASED GLUCOSE;  Start 3/2/19 at 00:00


Glucose (Glutose) 22.5 gm Q15M  PRN PO DECREASED GLUCOSE;  Start 3/2/19 at 00:00


Dextrose (D50w Syringe) 25 ml Q15M  PRN IV DECREASED GLUCOSE;  Start 3/2/19 at 


00:00


Dextrose (D50w Syringe) 50 ml Q15M  PRN IV DECREASED GLUCOSE;  Start 3/2/19 at 


00:00


Glucagon (Glucagen) 1 mg Q15M  PRN IM DECREASED GLUCOSE;  Start 3/2/19 at 00:00


Glucose (Glutose) 15 gm Q15M  PRN BUCCAL DECREASED GLUCOSE;  Start 3/2/19 at 


00:00


Insulin Aspart (Novolog Insulin Pen) NOVOLOG *MODERATE* ALGORITHM WITH MEALS  


BEDTIME SC  Last administered on 3/11/19at 08:20; Admin Dose 4 UNIT;  Start 


3/3/19 at 13:00


Hydromorphone HCl (Dilaudid) 1 mg Q6H  PRN IV SEVERE PAIN LEVEL 7-10 Last 


administered on 3/11/19at 13:38; Admin Dose 1 MG;  Start 3/4/19 at 19:00


Famotidine (Pepcid) 20 mg BID PO  Last administered on 3/10/19at 20:54; Admin 


Dose 20 MG;  Start 3/5/19 at 21:00


Hydromorphone HCl (Dilaudid) 1 mg Q6H  PRN IM SEVERE PAIN LEVEL 7-10 Last 


administered on 3/8/19at 21:15; Admin Dose 1 MG;  Start 3/8/19 at 21:00


Insulin Aspart (Novolog Insulin Pen) 8 unit WITH  MEALS SC  Last administered on


3/11/19at 13:34; Admin Dose 8 UNIT;  Start 3/9/19 at 18:00


Insulin Glargine (Lantus) 25 units DAILY@0800 SC  Last administered on 3/11/19at


08:22; Admin Dose 25 UNITS;  Start 3/11/19 at 08:00











LUÍS LOZANO 11, 2019 14:05

## 2019-03-11 NOTE — RADRPT
Vent Rate: 77 bpm

RR Interval: 0 msec

VA Interval: 158 msec

QRS Duration: 72 msec

QT Interval: 372 msec

QTC Interval: 420 msec

P-R-T Axis: 50 - 54 - 61 degrees

 

 Normal sinus rhythm

 Normal ECG

 

Electronically Signed By: Erik Guthrie

## 2019-03-12 VITALS — HEART RATE: 86 BPM | DIASTOLIC BLOOD PRESSURE: 75 MMHG | SYSTOLIC BLOOD PRESSURE: 131 MMHG | RESPIRATION RATE: 20 BRPM

## 2019-03-12 VITALS — SYSTOLIC BLOOD PRESSURE: 100 MMHG | HEART RATE: 71 BPM | RESPIRATION RATE: 18 BRPM | DIASTOLIC BLOOD PRESSURE: 55 MMHG

## 2019-03-12 VITALS — RESPIRATION RATE: 11 BRPM | SYSTOLIC BLOOD PRESSURE: 113 MMHG | DIASTOLIC BLOOD PRESSURE: 66 MMHG | HEART RATE: 90 BPM

## 2019-03-12 VITALS — DIASTOLIC BLOOD PRESSURE: 76 MMHG | SYSTOLIC BLOOD PRESSURE: 119 MMHG | RESPIRATION RATE: 19 BRPM | HEART RATE: 90 BPM

## 2019-03-12 VITALS — SYSTOLIC BLOOD PRESSURE: 123 MMHG | RESPIRATION RATE: 17 BRPM | DIASTOLIC BLOOD PRESSURE: 76 MMHG | HEART RATE: 92 BPM

## 2019-03-12 VITALS — HEART RATE: 90 BPM | RESPIRATION RATE: 20 BRPM | DIASTOLIC BLOOD PRESSURE: 77 MMHG | SYSTOLIC BLOOD PRESSURE: 120 MMHG

## 2019-03-12 VITALS — HEART RATE: 82 BPM | SYSTOLIC BLOOD PRESSURE: 107 MMHG | RESPIRATION RATE: 22 BRPM | DIASTOLIC BLOOD PRESSURE: 60 MMHG

## 2019-03-12 VITALS — DIASTOLIC BLOOD PRESSURE: 75 MMHG | RESPIRATION RATE: 19 BRPM | SYSTOLIC BLOOD PRESSURE: 129 MMHG | HEART RATE: 98 BPM

## 2019-03-12 VITALS — RESPIRATION RATE: 20 BRPM | DIASTOLIC BLOOD PRESSURE: 75 MMHG | HEART RATE: 94 BPM | SYSTOLIC BLOOD PRESSURE: 133 MMHG

## 2019-03-12 VITALS — HEART RATE: 95 BPM | RESPIRATION RATE: 19 BRPM | DIASTOLIC BLOOD PRESSURE: 70 MMHG | SYSTOLIC BLOOD PRESSURE: 119 MMHG

## 2019-03-12 VITALS — RESPIRATION RATE: 19 BRPM | SYSTOLIC BLOOD PRESSURE: 123 MMHG | HEART RATE: 88 BPM | DIASTOLIC BLOOD PRESSURE: 60 MMHG

## 2019-03-12 VITALS — HEART RATE: 90 BPM | DIASTOLIC BLOOD PRESSURE: 73 MMHG | SYSTOLIC BLOOD PRESSURE: 129 MMHG | RESPIRATION RATE: 22 BRPM

## 2019-03-12 VITALS — RESPIRATION RATE: 21 BRPM | DIASTOLIC BLOOD PRESSURE: 79 MMHG | HEART RATE: 84 BPM | SYSTOLIC BLOOD PRESSURE: 131 MMHG

## 2019-03-12 VITALS — HEART RATE: 84 BPM | RESPIRATION RATE: 21 BRPM | DIASTOLIC BLOOD PRESSURE: 77 MMHG | SYSTOLIC BLOOD PRESSURE: 134 MMHG

## 2019-03-12 VITALS — DIASTOLIC BLOOD PRESSURE: 67 MMHG | HEART RATE: 90 BPM | SYSTOLIC BLOOD PRESSURE: 129 MMHG | RESPIRATION RATE: 18 BRPM

## 2019-03-12 VITALS — SYSTOLIC BLOOD PRESSURE: 129 MMHG | RESPIRATION RATE: 19 BRPM | DIASTOLIC BLOOD PRESSURE: 79 MMHG | HEART RATE: 94 BPM

## 2019-03-12 VITALS — SYSTOLIC BLOOD PRESSURE: 109 MMHG | DIASTOLIC BLOOD PRESSURE: 60 MMHG | HEART RATE: 82 BPM | RESPIRATION RATE: 17 BRPM

## 2019-03-12 VITALS — DIASTOLIC BLOOD PRESSURE: 70 MMHG | SYSTOLIC BLOOD PRESSURE: 136 MMHG | HEART RATE: 105 BPM | RESPIRATION RATE: 19 BRPM

## 2019-03-12 VITALS — DIASTOLIC BLOOD PRESSURE: 60 MMHG | RESPIRATION RATE: 18 BRPM | SYSTOLIC BLOOD PRESSURE: 101 MMHG | HEART RATE: 80 BPM

## 2019-03-12 VITALS — SYSTOLIC BLOOD PRESSURE: 131 MMHG | RESPIRATION RATE: 13 BRPM | DIASTOLIC BLOOD PRESSURE: 73 MMHG | HEART RATE: 90 BPM

## 2019-03-12 VITALS — RESPIRATION RATE: 19 BRPM | HEART RATE: 90 BPM | DIASTOLIC BLOOD PRESSURE: 73 MMHG | SYSTOLIC BLOOD PRESSURE: 133 MMHG

## 2019-03-12 LAB
ARTERIAL BASE EXCESS: -5.3 MMOL/L (ref -3–3)
ARTERIAL BLOOD GAS OXYGEN SAT: 98.9 MMHG (ref 95–98)
ARTERIAL COHB: 0.2 % (ref 0–3)
ARTERIAL FRACTION OF OXYHGB: 98.5 % (ref 93–99)
ARTERIAL HCO3: 20.8 MMOL/L (ref 22–26)
ARTERIAL METHB: 0.2 % (ref 0–1.5)
ARTERIAL PCO2: 42.5 MMHG (ref 35–45)
FIO2: 100 %
IMMEDIATE SPIN CROSSMATCH: 1 4
INR: 1.04
MODE: (no result)
O2 A-A PPRESDIFF RESPIRATORY: 474.9 MMHG (ref 7–24)
PARTIAL THROMBOPLASTIN TIME: 38.2 SEC (ref 23–35)
PLATELET COUNT: 339 10^3/UL (ref 140–415)
PROTIME: 13.7 SEC (ref 11.9–14.9)
PT RATIO: 1.1
THROMBIN TIME: 14.2 SEC (ref 13.8–19.1)

## 2019-03-12 PROCEDURE — 0DNE0ZZ RELEASE LARGE INTESTINE, OPEN APPROACH: ICD-10-PCS

## 2019-03-12 PROCEDURE — 3E0R3BZ INTRODUCTION OF ANESTHETIC AGENT INTO SPINAL CANAL, PERCUTANEOUS APPROACH: ICD-10-PCS

## 2019-03-12 PROCEDURE — 30233N1 TRANSFUSION OF NONAUTOLOGOUS RED BLOOD CELLS INTO PERIPHERAL VEIN, PERCUTANEOUS APPROACH: ICD-10-PCS

## 2019-03-12 PROCEDURE — 0DN84ZZ RELEASE SMALL INTESTINE, PERCUTANEOUS ENDOSCOPIC APPROACH: ICD-10-PCS

## 2019-03-12 PROCEDURE — 0DTL0ZZ RESECTION OF TRANSVERSE COLON, OPEN APPROACH: ICD-10-PCS

## 2019-03-12 PROCEDURE — 00HU33Z INSERTION OF INFUSION DEVICE INTO SPINAL CANAL, PERCUTANEOUS APPROACH: ICD-10-PCS

## 2019-03-12 PROCEDURE — 0DUE0KZ SUPPLEMENT LARGE INTESTINE WITH NONAUTOLOGOUS TISSUE SUBSTITUTE, OPEN APPROACH: ICD-10-PCS

## 2019-03-12 PROCEDURE — 0WQF0ZZ REPAIR ABDOMINAL WALL, OPEN APPROACH: ICD-10-PCS

## 2019-03-12 PROCEDURE — 0DNU0ZZ RELEASE OMENTUM, OPEN APPROACH: ICD-10-PCS

## 2019-03-12 PROCEDURE — 0DN80ZZ RELEASE SMALL INTESTINE, OPEN APPROACH: ICD-10-PCS

## 2019-03-12 RX ADMIN — INSULIN ASPART 1 UNIT: 100 INJECTION, SOLUTION INTRAVENOUS; SUBCUTANEOUS at 11:54

## 2019-03-12 RX ADMIN — INSULIN GLARGINE SCH UNITS: 100 INJECTION, SOLUTION SUBCUTANEOUS at 08:00

## 2019-03-12 RX ADMIN — FAMOTIDINE 1 MG: 20 TABLET ORAL at 07:47

## 2019-03-12 RX ADMIN — INSULIN ASPART 1 UNIT: 100 INJECTION, SOLUTION INTRAVENOUS; SUBCUTANEOUS at 10:59

## 2019-03-12 RX ADMIN — INSULIN ASPART 1 UNIT: 100 INJECTION, SOLUTION INTRAVENOUS; SUBCUTANEOUS at 17:00

## 2019-03-12 RX ADMIN — LIDOCAINE HYDROCHLORIDE 1 ML: 10 INJECTION, SOLUTION EPIDURAL; INFILTRATION; INTRACAUDAL; PERINEURAL at 17:01

## 2019-03-12 RX ADMIN — Medication SCH MLS/HR: at 18:30

## 2019-03-12 RX ADMIN — METRONIDAZOLE 1 MLS/HR: 500 SOLUTION INTRAVENOUS at 18:30

## 2019-03-12 RX ADMIN — ZOLPIDEM TARTRATE 1 MG: 5 TABLET, FILM COATED ORAL at 01:14

## 2019-03-12 RX ADMIN — INSULIN GLARGINE 1 UNITS: 100 INJECTION, SOLUTION SUBCUTANEOUS at 08:00

## 2019-03-12 RX ADMIN — THIAMINE HYDROCHLORIDE 1 MLS/HR: 100 INJECTION, SOLUTION INTRAMUSCULAR; INTRAVENOUS at 20:00

## 2019-03-12 RX ADMIN — HYDROMORPHONE HYDROCHLORIDE PRN MG: 1 INJECTION, SOLUTION INTRAMUSCULAR; INTRAVENOUS; SUBCUTANEOUS at 14:16

## 2019-03-12 RX ADMIN — FAMOTIDINE SCH MG: 20 TABLET ORAL at 07:47

## 2019-03-12 RX ADMIN — HYDROMORPHONE HYDROCHLORIDE 1 MG: 1 INJECTION, SOLUTION INTRAMUSCULAR; INTRAVENOUS; SUBCUTANEOUS at 09:30

## 2019-03-12 RX ADMIN — INSULIN ASPART 1 UNIT: 100 INJECTION, SOLUTION INTRAVENOUS; SUBCUTANEOUS at 08:20

## 2019-03-12 RX ADMIN — FOLIC ACID SCH MLS/HR: 5 INJECTION, SOLUTION INTRAMUSCULAR; INTRAVENOUS; SUBCUTANEOUS at 10:13

## 2019-03-12 RX ADMIN — CEFAZOLIN 1 GM: 1 INJECTION, POWDER, FOR SOLUTION INTRAMUSCULAR; INTRAVENOUS at 16:06

## 2019-03-12 RX ADMIN — FAMOTIDINE 1 MG: 20 TABLET ORAL at 21:00

## 2019-03-12 RX ADMIN — FENTANYL CIT 0.2 MG/100ML-ROPIV 0.2%-NACL 0.9% EPIDURAL INJ SCH ML: 2/0.2 SOLUTION at 23:07

## 2019-03-12 RX ADMIN — THIAMINE HYDROCHLORIDE 1 MLS/HR: 100 INJECTION, SOLUTION INTRAMUSCULAR; INTRAVENOUS at 10:13

## 2019-03-12 RX ADMIN — ZOLPIDEM TARTRATE PRN MG: 5 TABLET, FILM COATED ORAL at 01:14

## 2019-03-12 RX ADMIN — FENTANYL CIT 0.2 MG/100ML-ROPIV 0.2%-NACL 0.9% EPIDURAL INJ 1 ML: 2/0.2 SOLUTION at 23:07

## 2019-03-12 RX ADMIN — INSULIN ASPART 1 UNIT: 100 INJECTION, SOLUTION INTRAVENOUS; SUBCUTANEOUS at 21:00

## 2019-03-12 RX ADMIN — FAMOTIDINE SCH MG: 20 TABLET ORAL at 21:00

## 2019-03-12 RX ADMIN — CEFAZOLIN SODIUM 1 MLS/HR: 2 SOLUTION INTRAVENOUS at 18:30

## 2019-03-12 RX ADMIN — INSULIN ASPART 1 UNIT: 100 INJECTION, SOLUTION INTRAVENOUS; SUBCUTANEOUS at 08:00

## 2019-03-12 RX ADMIN — FOLIC ACID SCH MLS/HR: 5 INJECTION, SOLUTION INTRAMUSCULAR; INTRAVENOUS; SUBCUTANEOUS at 20:00

## 2019-03-12 RX ADMIN — HYDROMORPHONE HYDROCHLORIDE 1 MG: 1 INJECTION, SOLUTION INTRAMUSCULAR; INTRAVENOUS; SUBCUTANEOUS at 14:16

## 2019-03-12 RX ADMIN — HYDROMORPHONE HYDROCHLORIDE PRN MG: 1 INJECTION, SOLUTION INTRAMUSCULAR; INTRAVENOUS; SUBCUTANEOUS at 09:30

## 2019-03-12 RX ADMIN — BUPIVACAINE HYDROCHLORIDE AND EPINEPHRINE BITARTRATE 1 ML: 5; .005 INJECTION, SOLUTION EPIDURAL; INTRACAUDAL; PERINEURAL at 17:01

## 2019-03-12 NOTE — CONS
Assessment/Plan


Assessment/Plan


Hospital Course (Demo Recall)


57 yo with newly diagnosed colon adenoca


-CT chest shows 4 mm noncalcified nodule in the right apex, which is 


nonspecific. 


-CT AP: Irregular bowel wall thickening / mass with adjacent stranding of the 


distal transverse colon, compatible with given history of colon cancer. Shotty 


periaortic lymph nodes. The largest is an 11 mm node with normal fatty hilum, 


suggestive of reactive etiology.  Small nonspecific peripherally sclerotic 


lesion in the left iliac bone measuring 6 mm.


-bone scan negative for mets


-cont IV iron for iron deficiency anemia


-pt undergoing cardiac clearance w/u prior to surgery


-further oncologic recommendations will be based on surgical path results





Consultation Date/Type/Reason


Admit Date/Time


Mar 1, 2019 at 20:38


Initial Consult Date


3/10/19


Type of Consult


oncology


Reason for Consultation


colon cancer


Requesting Provider:  HANNAH DOWNING MD


Date/Time of Note


DATE: 3/12/19 


TIME: 14:33





24 HR Interval Summary


Free Text/Dictation


surgery scheduled for today





Exam/Review of Systems


Exam


Vitals





Vital Signs


  Date      Temp  Pulse  Resp  B/P (MAP)   Pulse Ox  O2          O2 Flow    FiO2


Time                                                 Delivery    Rate


   3/12/19  99.2     71    18      100/55        98


     07:30                           (70)


   3/10/19                                           Room Air


     02:00








Intake and Output





3/11/19


3/11/19


3/12/19





1515:00


23:00


07:00





IntakeIntake Total


440 ml


360 ml





BalanceBalance


440 ml


360 ml











Constitutional:  alert, oriented


Psych:  no complaints


Head:  normocephalic


Eyes:  nl conjunctiva


ENMT:  nl external ears & nose


Neck:  supple


Respiratory:  clear to auscultation


Cardiovascular:  regular rate and rhythm


Gastrointestinal:  soft


Musculoskeletal:  nl extremities to inspection


Extremities:  normal pulses





Results


Result Diagram:  


3/11/19 0509                                                                    


           3/11/19 0509





Results 24hrs





Laboratory Tests


  Test
            3/11/19
17:50  3/11/19
20:40  3/12/19
07:57  3/12/19
11:53


  Bedside Glucose          199            145            122            156








Medications


Medication





Current Medications


IV Flush (NS 3 ml) 3 ml PER PROTOCOL IV ;  Start 3/2/19 at 00:00


Ondansetron HCl (Zofran Inj) 4 mg Q6H  PRN IV NAUSEA/VOMITING;  Start 3/2/19 at 


00:00


Acetaminophen (Tylenol Tab) 650 mg Q6H  PRN PO .PAIN 1-3 OR TEMP Last 


administered on 3/2/19at 01:03; Admin Dose 650 MG;  Start 3/2/19 at 00:00


Acetaminophen/ Hydrocodone Bitart (Norco (5/325)) 1 tab Q6H  PRN PO .PAIN 4-6;  


Start 3/2/19 at 00:00


Zolpidem Tartrate (Ambien) 10 mg QHS  PRN PO .INSOMNIA Last administered on 


3/12/19at 01:14; Admin Dose 10 MG;  Start 3/2/19 at 00:00


Albuterol/ Ipratropium (Duoneb) 3 ml Q2H RESP THERAPY  PRN HHN SHORTNESS OF 


BREATH;  Start 3/2/19 at 00:00


Miscellaneous Information 1 ea NOTE XX ;  Start 3/2/19 at 00:00


Glucose (Glutose) 15 gm Q15M  PRN PO DECREASED GLUCOSE;  Start 3/2/19 at 00:00


Glucose (Glutose) 22.5 gm Q15M  PRN PO DECREASED GLUCOSE;  Start 3/2/19 at 00:00


Dextrose (D50w Syringe) 25 ml Q15M  PRN IV DECREASED GLUCOSE;  Start 3/2/19 at 


00:00


Dextrose (D50w Syringe) 50 ml Q15M  PRN IV DECREASED GLUCOSE;  Start 3/2/19 at 


00:00


Glucagon (Glucagen) 1 mg Q15M  PRN IM DECREASED GLUCOSE;  Start 3/2/19 at 00:00


Glucose (Glutose) 15 gm Q15M  PRN BUCCAL DECREASED GLUCOSE;  Start 3/2/19 at 


00:00


Famotidine (Pepcid) 20 mg BID PO  Last administered on 3/11/19at 20:39; Admin 


Dose 20 MG;  Start 3/5/19 at 21:00


Insulin Aspart (Novolog Insulin Pen) 8 unit WITH  MEALS SC  Last administered on


3/11/19at 17:58; Admin Dose 8 UNIT;  Start 3/9/19 at 18:00


Insulin Glargine (Lantus) 25 units DAILY@0800 SC  Last administered on 3/11/19at


08:22; Admin Dose 25 UNITS;  Start 3/11/19 at 08:00


Hydromorphone HCl (Dilaudid) 1 mg Q4H  PRN IV SEVERE PAIN LEVEL 7-10 Last 


administered on 3/12/19at 14:16; Admin Dose 1 MG;  Start 3/11/19 at 17:00


Insulin Aspart (Novolog Insulin Pen) NOVOLOG *MODERATE* ALGORITHM Q4 SC ;  Start


3/12/19 at 09:00


Sodium Chloride 1,000 ml @  100 mls/hr Q10H IV  Last administered on 3/12/19at 


10:13; Admin Dose 100 MLS/HR;  Start 3/12/19 at 10:00











ALONDRA LEVY M.D.              Mar 12, 2019 14:36

## 2019-03-12 NOTE — PN
Date/Time of Note


Date/Time of Note


DATE: 3/12/19 


TIME: 13:32





Assessment/Plan


VTE Prophylaxis


Risk score (from Nsg)>0 risk:  1


SCD applied (from Nsg):  Yes


Pharmacological prophylaxis:  NA/contraindicated


Pharm contraindication:  surgical contra





Lines/Catheters


IV Catheter Type (from Nrsg):  Peripheral IV


Urinary Cath still in place:  No





Assessment/Plan


Hospital Course


59 yo male with DM who presented with hematochezia.  Workup has revealed colon 


adenocarcinoma





Colon adenocarcinoma:


- Bone scan negative for mets


- s/p colonoscopy with path showing adenocarcinoma


- Dr Mcintyre following


- General surgery Dr Dugan has agreed to take the case (Dr Bullard out of 


town), surgery planned for today





Iron deficiency anemia:


- IV iron





Diabetes


- Basal/bolus insulin





CKD II:


- stable





Prophylaxis: SCDs





DC planning: Surgery today


Result Diagram:  


3/11/19 0509                                                                    


           3/11/19 0509





Results 24hrs





Laboratory Tests


  Test
            3/11/19
17:50  3/11/19
20:40  3/12/19
07:57  3/12/19
11:53


  Bedside Glucose          199            145            122            156








Subjective


24 Hr Interval Summary


Constitutional:  no complaints





Exam/Review of Systems


Exam


Vitals





Vital Signs


  Date      Temp  Pulse  Resp  B/P (MAP)   Pulse Ox  O2          O2 Flow    FiO2


Time                                                 Delivery    Rate


   3/12/19  99.2     71    18      100/55        98


     07:30                           (70)


   3/10/19                                           Room Air


     02:00








Intake and Output





3/11/19


3/11/19


3/12/19





1515:00


23:00


07:00





IntakeIntake Total


440 ml


360 ml





BalanceBalance


440 ml


360 ml











Constitutional:  alert, oriented


Respiratory:  clear to auscultation


Cardiovascular:  regular rate and rhythm


Gastrointestinal:  soft; 


   No distended


Musculoskeletal:  nl extremities to inspection





Results


Results 24hrs





Laboratory Tests


  Test
            3/11/19
17:50  3/11/19
20:40  3/12/19
07:57  3/12/19
11:53


  Bedside Glucose          199            145            122            156








Medications


Medication





Current Medications


IV Flush (NS 3 ml) 3 ml PER PROTOCOL IV ;  Start 3/2/19 at 00:00


Ondansetron HCl (Zofran Inj) 4 mg Q6H  PRN IV NAUSEA/VOMITING;  Start 3/2/19 at 


00:00


Acetaminophen (Tylenol Tab) 650 mg Q6H  PRN PO .PAIN 1-3 OR TEMP Last 


administered on 3/2/19at 01:03; Admin Dose 650 MG;  Start 3/2/19 at 00:00


Acetaminophen/ Hydrocodone Bitart (Norco (5/325)) 1 tab Q6H  PRN PO .PAIN 4-6;  


Start 3/2/19 at 00:00


Zolpidem Tartrate (Ambien) 10 mg QHS  PRN PO .INSOMNIA Last administered on 


3/12/19at 01:14; Admin Dose 10 MG;  Start 3/2/19 at 00:00


Albuterol/ Ipratropium (Duoneb) 3 ml Q2H RESP THERAPY  PRN HHN SHORTNESS OF 


BREATH;  Start 3/2/19 at 00:00


Miscellaneous Information 1 ea NOTE XX ;  Start 3/2/19 at 00:00


Glucose (Glutose) 15 gm Q15M  PRN PO DECREASED GLUCOSE;  Start 3/2/19 at 00:00


Glucose (Glutose) 22.5 gm Q15M  PRN PO DECREASED GLUCOSE;  Start 3/2/19 at 00:00


Dextrose (D50w Syringe) 25 ml Q15M  PRN IV DECREASED GLUCOSE;  Start 3/2/19 at 


00:00


Dextrose (D50w Syringe) 50 ml Q15M  PRN IV DECREASED GLUCOSE;  Start 3/2/19 at 


00:00


Glucagon (Glucagen) 1 mg Q15M  PRN IM DECREASED GLUCOSE;  Start 3/2/19 at 00:00


Glucose (Glutose) 15 gm Q15M  PRN BUCCAL DECREASED GLUCOSE;  Start 3/2/19 at 


00:00


Famotidine (Pepcid) 20 mg BID PO  Last administered on 3/11/19at 20:39; Admin 


Dose 20 MG;  Start 3/5/19 at 21:00


Insulin Aspart (Novolog Insulin Pen) 8 unit WITH  MEALS SC  Last administered on


3/11/19at 17:58; Admin Dose 8 UNIT;  Start 3/9/19 at 18:00


Insulin Glargine (Lantus) 25 units DAILY@0800 SC  Last administered on 3/11/19at


08:22; Admin Dose 25 UNITS;  Start 3/11/19 at 08:00


Hydromorphone HCl (Dilaudid) 1 mg Q4H  PRN IV SEVERE PAIN LEVEL 7-10 Last 


administered on 3/12/19at 09:30; Admin Dose 1 MG;  Start 3/11/19 at 17:00


Insulin Aspart (Novolog Insulin Pen) NOVOLOG *MODERATE* ALGORITHM Q4 SC ;  Start


3/12/19 at 09:00


Sodium Chloride 1,000 ml @  100 mls/hr Q10H IV  Last administered on 3/12/19at 


10:13; Admin Dose 100 MLS/HR;  Start 3/12/19 at 10:00











FELICIA MELENDEZ                  Mar 12, 2019 13:34

## 2019-03-12 NOTE — PREAC
Date/Time of Note


Date/Time of Note


DATE: 3/12/19 


TIME: 15:22





Anesthesia Eval and Record


Evaluation


Time Pre-Procedure Interview


DATE: 3/12/19 


TIME: 15:22


Age


58


Sex


male


NPO:  8 hrs


Preoperative diagnosis


Colon Cancer


Planned procedure


Laparoscopic vs Open partial Colectomy





Past Medical History


Past Medical History:  Includes


Cardio:  Dyslipidemia


Endo:  Diabetes





Surgery & Anesthesia Issues


No known issue





Meds


Anticoagulation:  No


Beta Blocker within 24 hr:  No


Reason Beta Blocker not given:  Pt. not on B-Blocker





Current Medications


IV Flush (NS 3 ml) 3 ml PER PROTOCOL IV ;  Start 3/2/19 at 00:00


Ondansetron HCl (Zofran Inj) 4 mg Q6H  PRN IV NAUSEA/VOMITING;  Start 3/2/19 at 


00:00


Acetaminophen (Tylenol Tab) 650 mg Q6H  PRN PO .PAIN 1-3 OR TEMP Last 


administered on 3/2/19at 01:03; Admin Dose 650 MG;  Start 3/2/19 at 00:00


Acetaminophen/ Hydrocodone Bitart (Norco (5/325)) 1 tab Q6H  PRN PO .PAIN 4-6;  


Start 3/2/19 at 00:00


Zolpidem Tartrate (Ambien) 10 mg QHS  PRN PO .INSOMNIA Last administered on 


3/12/19at 01:14; Admin Dose 10 MG;  Start 3/2/19 at 00:00


Albuterol/ Ipratropium (Duoneb) 3 ml Q2H RESP THERAPY  PRN HHN SHORTNESS OF 


BREATH;  Start 3/2/19 at 00:00


Miscellaneous Information 1 ea NOTE XX ;  Start 3/2/19 at 00:00


Glucose (Glutose) 15 gm Q15M  PRN PO DECREASED GLUCOSE;  Start 3/2/19 at 00:00


Glucose (Glutose) 22.5 gm Q15M  PRN PO DECREASED GLUCOSE;  Start 3/2/19 at 00:00


Dextrose (D50w Syringe) 25 ml Q15M  PRN IV DECREASED GLUCOSE;  Start 3/2/19 at 


00:00


Dextrose (D50w Syringe) 50 ml Q15M  PRN IV DECREASED GLUCOSE;  Start 3/2/19 at 


00:00


Glucagon (Glucagen) 1 mg Q15M  PRN IM DECREASED GLUCOSE;  Start 3/2/19 at 00:00


Glucose (Glutose) 15 gm Q15M  PRN BUCCAL DECREASED GLUCOSE;  Start 3/2/19 at 


00:00


Famotidine (Pepcid) 20 mg BID PO  Last administered on 3/11/19at 20:39; Admin 


Dose 20 MG;  Start 3/5/19 at 21:00


Insulin Aspart (Novolog Insulin Pen) 8 unit WITH  MEALS SC  Last administered on


3/11/19at 17:58; Admin Dose 8 UNIT;  Start 3/9/19 at 18:00


Insulin Glargine (Lantus) 25 units DAILY@0800 SC  Last administered on 3/11/19at


08:22; Admin Dose 25 UNITS;  Start 3/11/19 at 08:00


Hydromorphone HCl (Dilaudid) 1 mg Q4H  PRN IV SEVERE PAIN LEVEL 7-10 Last 


administered on 3/12/19at 14:16; Admin Dose 1 MG;  Start 3/11/19 at 17:00


Insulin Aspart (Novolog Insulin Pen) NOVOLOG *MODERATE* ALGORITHM Q4 SC ;  Start


3/12/19 at 09:00


Sodium Chloride 1,000 ml @  100 mls/hr Q10H IV  Last administered on 3/12/19at 


10:13; Admin Dose 100 MLS/HR;  Start 3/12/19 at 10:00


Meds reviewed:  Yes





Allergies


Coded Allergies:  


     No Known Allergy (Unverified , 3/1/19)


Allergies Reviewed:  Yes





Labs/Studies


Labs Reviewed:  Reviewed by anesthesiologist


Result Diagram:  


3/11/19 0509                                                                    


           3/11/19 0509





Pregnancy test:  N/A


Studies:  ECG (n/a), CXR (n/a)





Pre-procedure Exam


Last vitals





Vital Signs


  Date      Temp  Pulse  Resp  B/P (MAP)   Pulse Ox  O2          O2 Flow    FiO2


Time                                                 Delivery    Rate


   3/12/19  99.2     71    18      100/55        98


     07:30                           (70)


   3/10/19                                           Room Air


     02:00





Airway:  Adequate mouth opening, Adequate thyromental dist


Mallampati:  Mallampati II


Teeth:  Normal


Lung:  Normal


Heart:  Normal





ASA Physical Status


ASA physical status:  3


Emergency:  None





Planned Anesthetic


General/MAC:  ETT


Neuraxial:  Spinal, Epidural


Nerve block:  TAP (bilateral)





Planned Pain Management


Sub-arachniod narcotics, Single shot nerve block, Parenteral pain med





Pre-operative Attestations


Prior to commencing anesthesia and surgery, the patient was re-evaluated, there 


was verification of:


*The patient's identity


*The results of appropriate recent lab work and preoperative vital signs


*The above evaluation not changing prior to induction


*Anesthetic plan, risk benefits, alternative and complications discussed with 


patient/family; questions answered; patient/family understands, accepts and 


wishes to proceed.











KENNY CABELLO MD              Mar 12, 2019 15:25

## 2019-03-12 NOTE — PAC
Date/Time of Note


Date/Time of Note


DATE: 3/12/19 


TIME: 22:04





Post-Anesthesia Notes


Post-Anesthesia Note


Last documented vital signs





Vital Signs


  Date      Temp  Pulse  Resp  B/P (MAP)   Pulse Ox  O2          O2 Flow    FiO2


Time                                                 Delivery    Rate


   3/12/19  97.8     91    18      107/60       100  Face Mask         8 L


     21:57                           (79)


   3/12/19           71    18      100/55        98


     07:30                           (70)


   3/10/19                                           Room Air


     02:00





Activity:  WNL


Respiratory function:  WNL


Cardiovascular function:  WNL


Mental status:  Baseline


Pain reasonably controlled:  Yes


Hydration appropriate:  Yes


Nausea/Vomiting absent:  Yes











KENNY CABELLO MD              Mar 12, 2019 22:05

## 2019-03-12 NOTE — CONS
Consult Date/Type/Reason


Admit Date/Time


Mar 1, 2019 at 20:38


Initial Consult Date


3/10/19


Requesting Provider:  HANNAH DOWNING MD


Date/Time of Note


DATE: 3/12/19 


TIME: 09:38





Subjective


NO acute events - SPECT normal - surgery today at 3 pm. NO CP now. 





ROS: No fever, no chills, no nausea, no vomiting, no diarrhea/constipation


        No recent weight changes


        No chest pain, no PND, no orthopnea


        No dizziness, blurred vision


        No thirst, no heat or cold intolerance





Objective


Vitals





Vital Signs


  Date      Temp  Pulse  Resp  B/P (MAP)   Pulse Ox  O2          O2 Flow    FiO2


Time                                                 Delivery    Rate


   3/12/19  99.2     71    18      100/55        98


     07:30                           (70)


   3/10/19                                           Room Air


     02:00








Intake and Output





3/11/19


3/11/19


3/12/19





1515:00


23:00


07:00





IntakeIntake Total


440 ml


360 ml





BalanceBalance


440 ml


360 ml











Exam


General: WN/WD/NAD, AOx 3


HEENT: Unicetric/atraumatic/EOMI (follows commands)


NECK: JVD elevated, no thyromegaly


Lymph: no lymphadenopathy


HEART: regular with no S3, II/VI systolic murmur at apex


LUNGS: Coarse sounds


ABD: soft, NT, ND, +BS


: Intact


Neuro: non focal


SKIN: chronic changes


EXT: trace edema





Results/Medications


Result Diagram:  


3/11/19 0509                                                                    


           3/11/19 0509





Results 24 hrs





Laboratory Tests


  Test
            3/11/19
13:26  3/11/19
17:50  3/11/19
20:40  3/12/19
07:57


  Bedside Glucose          117            199            145            122





Medications





Current Medications


IV Flush (NS 3 ml) 3 ml PER PROTOCOL IV ;  Start 3/2/19 at 00:00


Ondansetron HCl (Zofran Inj) 4 mg Q6H  PRN IV NAUSEA/VOMITING;  Start 3/2/19 at 


00:00


Acetaminophen (Tylenol Tab) 650 mg Q6H  PRN PO .PAIN 1-3 OR TEMP Last 


administered on 3/2/19at 01:03; Admin Dose 650 MG;  Start 3/2/19 at 00:00


Acetaminophen/ Hydrocodone Bitart (Norco (5/325)) 1 tab Q6H  PRN PO .PAIN 4-6;  


Start 3/2/19 at 00:00


Zolpidem Tartrate (Ambien) 10 mg QHS  PRN PO .INSOMNIA Last administered on 


3/12/19at 01:14; Admin Dose 10 MG;  Start 3/2/19 at 00:00


Albuterol/ Ipratropium (Duoneb) 3 ml Q2H RESP THERAPY  PRN HHN SHORTNESS OF 


BREATH;  Start 3/2/19 at 00:00


Miscellaneous Information 1 ea NOTE XX ;  Start 3/2/19 at 00:00


Glucose (Glutose) 15 gm Q15M  PRN PO DECREASED GLUCOSE;  Start 3/2/19 at 00:00


Glucose (Glutose) 22.5 gm Q15M  PRN PO DECREASED GLUCOSE;  Start 3/2/19 at 00:00


Dextrose (D50w Syringe) 25 ml Q15M  PRN IV DECREASED GLUCOSE;  Start 3/2/19 at 


00:00


Dextrose (D50w Syringe) 50 ml Q15M  PRN IV DECREASED GLUCOSE;  Start 3/2/19 at 


00:00


Glucagon (Glucagen) 1 mg Q15M  PRN IM DECREASED GLUCOSE;  Start 3/2/19 at 00:00


Glucose (Glutose) 15 gm Q15M  PRN BUCCAL DECREASED GLUCOSE;  Start 3/2/19 at 


00:00


Famotidine (Pepcid) 20 mg BID PO  Last administered on 3/11/19at 20:39; Admin 


Dose 20 MG;  Start 3/5/19 at 21:00


Insulin Aspart (Novolog Insulin Pen) 8 unit WITH  MEALS SC  Last administered on


3/11/19at 17:58; Admin Dose 8 UNIT;  Start 3/9/19 at 18:00


Insulin Glargine (Lantus) 25 units DAILY@0800 SC  Last administered on 3/11/19at


08:22; Admin Dose 25 UNITS;  Start 3/11/19 at 08:00


Hydromorphone HCl (Dilaudid) 1 mg Q4H  PRN IV SEVERE PAIN LEVEL 7-10 Last 


administered on 3/12/19at 09:30; Admin Dose 1 MG;  Start 3/11/19 at 17:00


Insulin Aspart (Novolog Insulin Pen) NOVOLOG *MODERATE* ALGORITHM Q4 SC ;  Start


3/12/19 at 09:00


Imaging


1.  Preoperative evaluation in a patient prior to colectomy that has multiple 


cardiac risk factors and possible prior stent apparently placed approximately 2 


to 3 years prior.-neg trop x 2 - NEGATIVE STRESS TEST now 


2.  Hypotension, borderline.-ongoing today - better 


3.  History of possible prior stent placements 2 to 3 years prior per chart 


biopsy. On meds. 


4.  Dyslipidemia.


5.  Adenocarcinoma of the colon, newly diagnosed.


6.  Abdominal pain secondary to adenocarcinoma of the colon. SURGERY today. 


7.  Diabetes mellitus.


8.  Anemia.


9.  Renal failure insufficiency - cr better at 1.14.











MAGDALENO US MD                 Mar 12, 2019 09:39

## 2019-03-12 NOTE — PN
Date/Time of Note


Date/Time of Note


DATE: 3/12/19 


TIME: 12:03





Assessment/Plan


Lines/Catheters


IV Catheter Type (from [facility]):  Peripheral IV


Simpson in Place (from [facility]):  No





Assessment/Plan


Chief Complaint/Hosp Course


1.  Transverse colon adenocarcinoma on CT distal however on colonoscopy and CT 


from [place] proximal.  The mass was tattooed.  Shotty lymph nodes.  Previous 


surgeon unable to coordinate surgery therefore a secondary consult was obtained.


 Patient with history of ex lap due to bowel obstruction will make this more 


difficult operation.


-Medical/cardiac optimization in process


-Surgical resection today


2.  40 pound weight loss secondary to above


3.  Diabetes mellitus type 2


-Nutrition medication optimization


4.  Hypertension


-Nutrition medication optimization


5.  Coronary artery disease with history of Plavix and aspirin


-Cardiac evaluation and optimization


6.  Iron deficiency anemia secondary to above


-Iron transfusion per hematology


-As above





Thank you. Patient seen and examined in collaboration with Dr. Helder Dugan.





Subjective


24 Hr Interval Summary


No fevers, chills, sob, congested cough, cp, palpitations, ha, dizziness, 


n/v/d/dysuria.





Exam/Review of Systems


Vital Signs


Vitals





Vital Signs


  Date      Temp  Pulse  Resp  B/P (MAP)   Pulse Ox  O2          O2 Flow    FiO2


Time                                                 Delivery    Rate


   3/12/19  99.2     71    18      100/55        98


     07:30                           (70)


   3/10/19                                           Room Air


     02:00








Intake and Output





3/11/19


3/11/19


3/12/19





1515:00


23:00


07:00





IntakeIntake Total


440 ml


360 ml





BalanceBalance


440 ml


360 ml














Exam


Free Text/Dictation


Constitutional:  alert, oriented; 


   No distress


Psych:  nl mood/affect; 


   No anxiety


Head:  normocephalic, atraumatic


Eyes:  nl conjunctiva, EOMI, PERRL; 


   No icteric


ENMT:  nl external ears & nose, mucosa pink and moist


Neck:  non-tender; 


   No jvd


Respiratory:  normal air movement; 


   No congested cough, No labored breathing


Cardiovascular:  regular rate and rhythm; 


   No edema


Gastrointestinal:  soft, non-tender; 


   No distended, No rebound or guarding


Genitourinary - Male:  nl scrotum


Musculoskeletal:  nl extremities to inspection, nl gait and stance; 


   No joint tenderness


Extremities:  normal pulses; 


   No calf tenderness, No edema


Neurological:  nl mental status, nl speech, nl strength


Skin:  nl turgor; 


   No rash or lesions


Lymph:  nl lymph nodes





Results


Result Diagram:  


3/12/19 1540                                                                    


           3/11/19 0509














PATRICIA ASHRAF NP       Mar 12, 2019 12:09

## 2019-03-12 NOTE — OPR
Date/Time of Note


Date/Time of Note


DATE: 3/12/19 


TIME: 21:48





Operative Report


Procedure Date:  Mar 12, 2019


Preoperative Diagnosis


1.  Transverse colon adenocarcinoma


2.  Previous history of bowel resection of unknown etiology


Postoperative Diagnosis


1.  Transverse colon adenocarcinoma


2.  Significant upper abdominal adhesions


3.  Previous small bowel resection with primary anastomosis (antecolic over the 


cancer) and gastrojejunostomy


4.  Ventral incisional Swiss cheese type hernia


Operation/Procedure Performed


1.  Laparoscopic converted to open transverse colectomy


2.  Splenic mobilization


3.  Extensive lysis of adhesions


4.  Very difficult operation, modifier 22


5.  Implantation of biologic over the anastomosis below the gastrojejunostomy


6.  Revision of previous scar


7.  Primary repair of ventral incisional Swiss cheese type hernia


8.  Local anesthetic injection, [id number]


Surgeon


Linda Dugan MD


Assistant


Hailey Hernández NP


Anesthesia Type:  general (And local)


Anesthesiologist:  KENNY CABELLO MD


Estimated Blood Loss:  150 - 200 ml's


Transfusion


2 units packed red blood cells +500 mL's of 5% albumin +5 L of crystalloid


Specimen


Transverse colon with suture proximal


Ventral scar


Grafts/Implants


Burn matrix 10 x 15 cm xenograft biologic, ACell


Tubes/Drains


19 French Bennett


Complications


none


Pt Condition Post Procedure:  stable


Disposition:  PACU


Indications


Per notes.  Patient with new diagnosis of colon cancer found after anemia and 


colonoscopy and CT scan.  On-call surgeon unable to attend to patient and 


therefore I was asked to take care of the patient.  Different CTs and the 


colonoscopy had mentioned the lesion to be on different sides of the transverse 


colon.





Risks include but are not limited to bleeding, infection, abscess, seroma, leak,


damage to intestines or any intra-abdominal/intrapelvic structures, hernia 


formation, chronic pain, need for re-operations or further surgeries, MI, 


stroke, PE, DVT, pneumonia, organ failures, or even death.


Procedure Description


Patient was brought into the operating room, placed supine on the operating 


table, SCDs were placed, both arms were tucked, all pressure points were well-


padded, preoperative antibiotics administered, and after induction of 


anesthesia, patient was placed in the lithotomy. Simpson was inserted. Patient was


then prepped and draped in usual sterile fashion, and timeout was performed.





Incision was made in the right upper quadrant, and using an Optiview port and 5 


mm 0 scope abdomen was safely entered and insufflated to 15 mmHg with CO2. 


Laparoscopy was performed and no injuries were identified.  There were 


significant adhesions in the upper abdomen.  Under direct visualization 2 more 


fives were placed in the right lower and left lower quadrants.  No tattoo ink 


was identified.  No mass was identified.  However there was fullness in left 


upper quadrant.  Investigation identified small bowel resection in mid abdomen 


with primary anastomosis.  There was also significant adhesions of small bowel 


to the left upper quadrant to the liver and possible stomach.  The liver was 


plastered to the abdominal wall.





Local anesthetic injection was performed at all surgical sites initially.





Extensive lysis of adhesions was undertaken laparoscopically however I was not 


being very successful to identify the structures easily and decision was made to


convert to open.  





Midline incision was made and the previous scar was fully excised since it was 


large thickened and abnormal.  Abdomen was entered.





Further extensive lysis of adhesions were performed and I was able to identify 


that patient also had a gastrojejunostomy with a loop of bowel brought antecolic


and anastomosis to the stomach.  I was able to separate the omentum and small 


bowel from each other from the colon.  I made sure to preserve the 


gastrojejunostomy throughout the surgery.  I was able to separate the colon that


was underneath the gastrojejunostomy from the loop of jejunum on both sides.  


The colon was mobilized on both sides off of the wall along the left descending 


and right ascending colon.  Hepatic flexure was gently and meticulously 


mobilized with care taken not to injure the duodenum or any vasculature.  


Splenic mobilization was much more difficult due to the significant adhesions.  


This was the area where the mass was also identified within the colon.  Very 


meticulous tedious and gentle dissection was taken to separate the colon from 


the small bowel and from the spleen preserving all structures.  Posterior 


stomach was also plastered on the colon and the area of the mass and this was 


very gently shaved off using blunt dissection.  Several of the short gastrics 


had to be taken using LigaSure to be able to free up enough space and identified


the structures.  Once again this dissection was very tedious difficult and not 


long.





After fully identifying all structures and fully mobilizing the transverse colon


left and right colon with the flexures.  Mass was identified to be large about 


half to two thirds of the way of the transverse colon.  Decision was made to 


proceed with transverse colectomy.  The mesentery was divided using LigaSure 


middle colic was taken using LigaSure as well.  Colon was transected and distal 


ascending and mid descending and the specimen was fully mobilized and 


exteriorized.  Suture was placed proximally.  There was redundant sigmoid colon 


which was mobilized to be able to bring the left colon retrocrural gastr


ojejunostomy and to do a staple side-to-side colonic anastomosis.  Stay sutures 


were placed posteriorly.  Crotch suture was placed.  Anastomosis was made with a


75 blue load stapler.  Open end was closed with a TA blue 60 followed by a 


3-year-old Lembert silk sutures.





Possible internal hernia defect at this site were closed with silk sutures 


however to further secure the area and help with healing 10 x 15 cm xenograft 


biologic was placed over the anastomosis and posterior to the gastrojejunostomy.


 FloSeal was placed in left upper and right upper quadrants and there was 


complete hemostasis.





Abdomen was irrigated with 5 L of warm water to clear suctioning fluid.  There 


was no omentum left.  Part of omentum was also excised using LigaSure and sent 


to pathology.  Abdominal wall fascia was closed with #1 looped PDS sutures and 


also the hernia was were repaired primarily.  Wound was thoroughly irrigated and


interrupted 2-0 Vicryl subcutaneous sutures were placed to close off dead space.


 Skin staples were applied.  Betadine was applied.  Dressing was placed.





All counts were correct and the end of the operation 2. Patient was extubated 


and transferred to recovery room in stable condition.





Copies To:


CC:   SHERINE GOLDSMITH; MAGDALENO US MD; HELLEN HUNG MD; HANNAH DOWNING MD; FELICIA MELENDEZ; LUÍS LOZANO; ALONDRA LEVY M.D. ;











LINDA DUGAN MD              Mar 12, 2019 22:09

## 2019-03-13 VITALS — RESPIRATION RATE: 19 BRPM | DIASTOLIC BLOOD PRESSURE: 61 MMHG | SYSTOLIC BLOOD PRESSURE: 104 MMHG | HEART RATE: 92 BPM

## 2019-03-13 VITALS — SYSTOLIC BLOOD PRESSURE: 104 MMHG | HEART RATE: 95 BPM | DIASTOLIC BLOOD PRESSURE: 65 MMHG | RESPIRATION RATE: 18 BRPM

## 2019-03-13 VITALS — DIASTOLIC BLOOD PRESSURE: 58 MMHG | HEART RATE: 83 BPM | RESPIRATION RATE: 18 BRPM | SYSTOLIC BLOOD PRESSURE: 90 MMHG

## 2019-03-13 VITALS — HEART RATE: 95 BPM | SYSTOLIC BLOOD PRESSURE: 104 MMHG | RESPIRATION RATE: 19 BRPM | DIASTOLIC BLOOD PRESSURE: 63 MMHG

## 2019-03-13 VITALS — DIASTOLIC BLOOD PRESSURE: 56 MMHG | RESPIRATION RATE: 16 BRPM | HEART RATE: 79 BPM | SYSTOLIC BLOOD PRESSURE: 93 MMHG

## 2019-03-13 VITALS — SYSTOLIC BLOOD PRESSURE: 95 MMHG | DIASTOLIC BLOOD PRESSURE: 58 MMHG | HEART RATE: 78 BPM | RESPIRATION RATE: 18 BRPM

## 2019-03-13 VITALS — HEART RATE: 97 BPM | SYSTOLIC BLOOD PRESSURE: 108 MMHG | DIASTOLIC BLOOD PRESSURE: 64 MMHG | RESPIRATION RATE: 18 BRPM

## 2019-03-13 VITALS — RESPIRATION RATE: 19 BRPM | DIASTOLIC BLOOD PRESSURE: 63 MMHG | HEART RATE: 93 BPM | SYSTOLIC BLOOD PRESSURE: 99 MMHG

## 2019-03-13 LAB
ADD MAN DIFF?: NO
ADD UMIC: NO
ANION GAP: 12 (ref 5–13)
BASOPHIL #: 0 10^3/UL (ref 0–0.1)
BASOPHILS %: 0.1 % (ref 0–2)
BLOOD UREA NITROGEN: 13 MG/DL (ref 7–20)
CALCIUM: 8.2 MG/DL (ref 8.4–10.2)
CARBON DIOXIDE: 21 MMOL/L (ref 21–31)
CHLORIDE: 103 MMOL/L (ref 97–110)
CREATININE: 1.01 MG/DL (ref 0.61–1.24)
EOSINOPHILS #: 0 10^3/UL (ref 0–0.5)
EOSINOPHILS %: 0 % (ref 0–7)
GLUCOSE: 266 MG/DL (ref 70–220)
HEMATOCRIT: 32.9 % (ref 42–52)
HEMOGLOBIN: 10.1 G/DL (ref 14–18)
IMMATURE GRANS #M: 0.05 10^3/UL (ref 0–0.03)
IMMATURE GRANS % (M): 0.5 % (ref 0–0.43)
LYMPHOCYTES #: 0.7 10^3/UL (ref 0.8–2.9)
LYMPHOCYTES %: 6.1 % (ref 15–51)
MEAN CORPUSCULAR HEMOGLOBIN: 24.9 PG (ref 29–33)
MEAN CORPUSCULAR HGB CONC: 30.7 G/DL (ref 32–37)
MEAN CORPUSCULAR VOLUME: 81 FL (ref 82–101)
MEAN PLATELET VOLUME: 10.3 FL (ref 7.4–10.4)
MONOCYTE #: 0.3 10^3/UL (ref 0.3–0.9)
MONOCYTES %: 2.3 % (ref 0–11)
NEUTROPHIL #: 9.7 10^3/UL (ref 1.6–7.5)
NEUTROPHILS %: 91 % (ref 39–77)
NUCLEATED RED BLOOD CELLS #: 0 10^3/UL (ref 0–0)
NUCLEATED RED BLOOD CELLS%: 0 /100WBC (ref 0–0)
PLATELET COUNT: 313 10^3/UL (ref 140–415)
POTASSIUM: 4.8 MMOL/L (ref 3.5–5.1)
RED BLOOD COUNT: 4.06 10^6/UL (ref 4.7–6.1)
RED CELL DISTRIBUTION WIDTH: 16.5 % (ref 11.5–14.5)
SODIUM: 136 MMOL/L (ref 135–144)
UR ASCORBIC ACID: NEGATIVE MG/DL
UR BILIRUBIN (DIP): NEGATIVE MG/DL
UR BLOOD (DIP): NEGATIVE MG/DL
UR CLARITY: CLEAR
UR COLOR: (no result)
UR GLUCOSE (DIP): (no result) MG/DL
UR KETONES (DIP): NEGATIVE MG/DL
UR LEUKOCYTE ESTERASE (DIP): NEGATIVE LEU/UL
UR NITRITE (DIP): NEGATIVE MG/DL
UR PH (DIP): 6 (ref 5–9)
UR SPECIFIC GRAVITY (DIP): 1.01 (ref 1–1.03)
UR TOTAL PROTEIN (DIP): NEGATIVE MG/DL
UR UROBILINOGEN (DIP): NEGATIVE MG/DL
WHITE BLOOD COUNT: 10.7 10^3/UL (ref 4.8–10.8)

## 2019-03-13 RX ADMIN — FENTANYL CIT 0.2 MG/100ML-ROPIV 0.2%-NACL 0.9% EPIDURAL INJ SCH ML: 2/0.2 SOLUTION at 10:03

## 2019-03-13 RX ADMIN — DEXAMETHASONE SODIUM PHOSPHATE PRN MG: 10 INJECTION, SOLUTION INTRAMUSCULAR; INTRAVENOUS at 01:31

## 2019-03-13 RX ADMIN — ONDANSETRON HYDROCHLORIDE 1 MG: 2 INJECTION, SOLUTION INTRAMUSCULAR; INTRAVENOUS at 01:31

## 2019-03-13 RX ADMIN — INSULIN ASPART 1 UNIT: 100 INJECTION, SOLUTION INTRAVENOUS; SUBCUTANEOUS at 09:46

## 2019-03-13 RX ADMIN — FAMOTIDINE 1 MG: 10 INJECTION, SOLUTION INTRAVENOUS at 09:43

## 2019-03-13 RX ADMIN — FOLIC ACID SCH MLS/HR: 5 INJECTION, SOLUTION INTRAMUSCULAR; INTRAVENOUS; SUBCUTANEOUS at 00:38

## 2019-03-13 RX ADMIN — INSULIN ASPART 1 UNIT: 100 INJECTION, SOLUTION INTRAVENOUS; SUBCUTANEOUS at 11:40

## 2019-03-13 RX ADMIN — INSULIN ASPART 1 UNIT: 100 INJECTION, SOLUTION INTRAVENOUS; SUBCUTANEOUS at 21:00

## 2019-03-13 RX ADMIN — INSULIN ASPART 1 UNIT: 100 INJECTION, SOLUTION INTRAVENOUS; SUBCUTANEOUS at 17:02

## 2019-03-13 RX ADMIN — METRONIDAZOLE 1 MLS/HR: 500 SOLUTION INTRAVENOUS at 01:42

## 2019-03-13 RX ADMIN — THIAMINE HYDROCHLORIDE 1 MLS/HR: 100 INJECTION, SOLUTION INTRAMUSCULAR; INTRAVENOUS at 00:38

## 2019-03-13 RX ADMIN — PHENOL PRN SPRAY: 1.5 LIQUID ORAL at 19:36

## 2019-03-13 RX ADMIN — CEFAZOLIN SODIUM 1 MLS/HR: 2 SOLUTION INTRAVENOUS at 10:03

## 2019-03-13 RX ADMIN — DEXAMETHASONE SODIUM PHOSPHATE PRN MG: 10 INJECTION, SOLUTION INTRAMUSCULAR; INTRAVENOUS at 18:58

## 2019-03-13 RX ADMIN — Medication SCH MLS/HR: at 01:42

## 2019-03-13 RX ADMIN — HYDROMORPHONE HYDROCHLORIDE 1 MG: 1 INJECTION, SOLUTION INTRAMUSCULAR; INTRAVENOUS; SUBCUTANEOUS at 22:23

## 2019-03-13 RX ADMIN — FENTANYL CIT 0.2 MG/100ML-ROPIV 0.2%-NACL 0.9% EPIDURAL INJ 1 ML: 2/0.2 SOLUTION at 10:03

## 2019-03-13 RX ADMIN — DIPHENHYDRAMINE HYDROCHLORIDE SCH MG: 50 INJECTION, SOLUTION INTRAMUSCULAR; INTRAVENOUS at 09:43

## 2019-03-13 RX ADMIN — THIAMINE HYDROCHLORIDE 1 MLS/HR: 100 INJECTION, SOLUTION INTRAMUSCULAR; INTRAVENOUS at 12:28

## 2019-03-13 RX ADMIN — CEFAZOLIN SODIUM 1 MLS/HR: 2 SOLUTION INTRAVENOUS at 01:09

## 2019-03-13 RX ADMIN — INSULIN ASPART 1 UNIT: 100 INJECTION, SOLUTION INTRAVENOUS; SUBCUTANEOUS at 09:43

## 2019-03-13 RX ADMIN — INSULIN ASPART 1 UNIT: 100 INJECTION, SOLUTION INTRAVENOUS; SUBCUTANEOUS at 05:34

## 2019-03-13 RX ADMIN — FOLIC ACID SCH MLS/HR: 5 INJECTION, SOLUTION INTRAMUSCULAR; INTRAVENOUS; SUBCUTANEOUS at 12:28

## 2019-03-13 RX ADMIN — INSULIN ASPART 1 UNIT: 100 INJECTION, SOLUTION INTRAVENOUS; SUBCUTANEOUS at 01:08

## 2019-03-13 RX ADMIN — FENTANYL CIT 0.2 MG/100ML-ROPIV 0.2%-NACL 0.9% EPIDURAL INJ 1 ML: 2/0.2 SOLUTION at 21:36

## 2019-03-13 RX ADMIN — CELECOXIB SCH MG: 200 CAPSULE ORAL at 09:00

## 2019-03-13 RX ADMIN — METRONIDAZOLE 1 MLS/HR: 500 SOLUTION INTRAVENOUS at 12:25

## 2019-03-13 RX ADMIN — INSULIN GLARGINE 1 UNITS: 100 INJECTION, SOLUTION SUBCUTANEOUS at 09:47

## 2019-03-13 RX ADMIN — ONDANSETRON HYDROCHLORIDE 1 MG: 2 INJECTION, SOLUTION INTRAMUSCULAR; INTRAVENOUS at 18:58

## 2019-03-13 RX ADMIN — FENTANYL CIT 0.2 MG/100ML-ROPIV 0.2%-NACL 0.9% EPIDURAL INJ SCH ML: 2/0.2 SOLUTION at 21:36

## 2019-03-13 RX ADMIN — INSULIN ASPART 1 UNIT: 100 INJECTION, SOLUTION INTRAVENOUS; SUBCUTANEOUS at 17:16

## 2019-03-13 RX ADMIN — PHENOL 1 SPRAY: 1.4 SPRAY ORAL at 19:36

## 2019-03-13 RX ADMIN — HYDROMORPHONE HYDROCHLORIDE PRN MG: 1 INJECTION, SOLUTION INTRAMUSCULAR; INTRAVENOUS; SUBCUTANEOUS at 22:23

## 2019-03-13 RX ADMIN — CELECOXIB 1 MG: 200 CAPSULE ORAL at 09:00

## 2019-03-13 RX ADMIN — Medication SCH MLS/HR: at 12:25

## 2019-03-13 RX ADMIN — INSULIN ASPART 1 UNIT: 100 INJECTION, SOLUTION INTRAVENOUS; SUBCUTANEOUS at 13:08

## 2019-03-13 RX ADMIN — INSULIN GLARGINE SCH UNITS: 100 INJECTION, SOLUTION SUBCUTANEOUS at 09:47

## 2019-03-13 NOTE — OPPN
Date/Time of Note


Date/Time of Note


DATE: 3/13/19 


TIME: 10:44





Anesthesia Follow up


Anesthesia Follow up


Last documented vital signs





Vital Signs


  Date      Temp  Pulse  Resp  B/P (MAP)   Pulse Ox  O2          O2 Flow    FiO2


Time                                                 Delivery    Rate


   3/13/19  97.8     83    18  90/58 (69)        97  Room Air


     07:43


   3/12/19                                                             2.0


     22:45





Respiratory function:  WNL


Cardiovascular function:  WNL


Comments


POD#1


Patient is a 58 yr old with Hs of recently Dx AdenoCA of Transverse Colon, went 


under GETA yesterday for Laparoscopic to Open Transverse Colectomy and Lysis of 


Adhesion.


Low Thoracic Epidural Catheter placed for post op pain management as well as 


Bilateral TAP block was done at the end of the surgery.


Patient has a Hx of DM, HTN but otherwise stable. He moves all extremity and no 


weakness reported, His vital signs have been stable, he is afebrile and his pain


is well controlled.


Epidural infusion is running at 7cc/hr. 


Plan to continue the Epidural infusion at the same rate. 


Patient can be ambulated, pls hold the infusion an hour prior to ambulation and 


restart after patient is back to bed.











KENNY CABELLO MD              Mar 13, 2019 10:50

## 2019-03-13 NOTE — CONS
Assessment/Plan


Assessment/Plan


Hospital Course (Demo Recall)


59 yo with newly diagnosed colon adenoca


-CT chest shows 4 mm noncalcified nodule in the right apex, which is 


nonspecific. 


-CT AP: Irregular bowel wall thickening / mass with adjacent stranding of the 


distal transverse colon, compatible with given history of colon cancer. Shotty 


periaortic lymph nodes. The largest is an 11 mm node with normal fatty hilum, 


suggestive of reactive etiology.  Small nonspecific peripherally sclerotic 


lesion in the left iliac bone measuring 6 mm.


-bone scan negative for mets


-he is s/p surgery: per notes this revealed previous small bowel resection with 


primary anastomosis (antecolic over the cancer) and gastrojejunostomy; Ventral 


incisional Swiss cheese type hernia: s/p   Laparoscopic converted to open 


transverse colectomy, Revision of previous scar, Primary repair of ventral 


incisional Swiss cheese type hernia 3/12/19


-once we have pathology and stage, can determine if pt will need adjuvant chemo





Consultation Date/Type/Reason


Admit Date/Time


Mar 1, 2019 at 20:38


Initial Consult Date


3/6/19


Requesting Provider:  HANNAH DOWNING MD


Date/Time of Note


DATE: 3/13/19 


TIME: 16:42





24 HR Interval Summary


Free Text/Dictation


pt s/p surgery


no complaints at present





Exam/Review of Systems


Exam


Vitals





Vital Signs


  Date      Temp  Pulse  Resp  B/P (MAP)   Pulse Ox  O2          O2 Flow    FiO2


Time                                                 Delivery    Rate


   3/13/19  97.8     79    16  93/56 (68)        98  Room Air


     13:19


   3/12/19                                                             2.0


     22:45








Intake and Output





3/12/19


3/12/19


3/13/19





1515:00


23:00


07:00





IntakeIntake Total


6900 ml


1450 ml





OutputOutput Total


950 ml


25 ml





BalanceBalance


5950 ml


1425 ml











Constitutional:  alert, oriented, well developed


Psych:  no complaints, nl mood/affect


Head:  normocephalic, atraumatic


Eyes:  nl conjunctiva, EOMI, nl lids, nl sclera, PERRL


ENMT:  other (NGT)


Respiratory:  normal air movement


Gastrointestinal:  surgical scars


Extremities:  normal pulses


Neurological:  CNS II-XII intact, nl mental status, nl speech, nl strength





Results


Result Diagram:  


3/13/19 0427                                                                    


           3/13/19 0427





Results 24hrs





Laboratory Tests


Test
                 3/12/19
18:51  3/12/19
22:27  3/12/19
22:30  3/13/19
00:42


Blood Gas          Blood arterial
   
              
              



Specimen Source



Arterial Blood     3/12/2019
7:02:5  
              
              



Date Drawn
                    3 PM


Arterial Blood pH         7.307  L
  
              
              



(Temp
corrected)


Arterial Blood              42.5  
  
              
              



pCO2


(Temp
correct)


Arterial Blood            195.6  H
  
              
              



pO2


(Temp
corrected)


Arterial Blood              20.8  L


HCO3


Arterial Blood              -5.3  L


Base Excess


Arterial Blood             98.9  H
  
              
              



Oxygen
Saturation


Zach Test         N/A


Arterial Blood     A-Line  
         
              
              



Gas Puncture
Site


Arterial                     0.2  
  
              
              



Blood
Carboxyhemo


globin


Arterial Blood                0.2


Methemoglobin


Blood Gas A-a O2           474.9  H


Differential


Oxyhemoglobin                98.5


Percent


Blood Gas                    37.0


Temperature


Blood Gas          VENT - AC


Modality


FiO2                        100.0


Blood Gas          [facility]   
              
              



Notified Whom



Blood Gas          3/12/2019
7:18:0  
              
              



Notified Time
                 4 PM


Bedside Glucose                              176                          241  H


Urine Color                                         STRAW


Urine Clarity                                       CLEAR


Urine pH                                                    6.0


Urine Specific                                            1.008


Gravity


Urine Ketones                                       NEGATIVE


Urine Nitrite                                       NEGATIVE


Urine Bilirubin                                     NEGATIVE


Urine                                               NEGATIVE


Urobilinogen


Urine Leukocyte                                     NEGATIVE


Esterase


Urine Hemoglobin                                    NEGATIVE


Urine Glucose                                               1+  H


Urine Total                                         NEGATIVE


Protein


Test
                 3/13/19
04:27  3/13/19
05:30  3/13/19
09:26  3/13/19
13:00


White Blood Count           10.7  #


Red Blood Count             4.06  L


Hemoglobin                  10.1  L


Hematocrit                  32.9  L


Mean Corpuscular            81.0  L


Volume


Mean Corpuscular            24.9  L


Hemoglobin


Mean Corpuscular           30.7  L
  
              
              



Hemoglobin
Concen


t


Red Cell                    16.5  H


Distribution


Width


Platelet Count                313


Mean Platelet                10.3


Volume


Immature                   0.500  H


Granulocytes %


Neutrophils %               91.0  H


Lymphocytes %                6.1  L


Monocytes %                   2.3


Eosinophils %                 0.0


Basophils %                   0.1


Nucleated Red                 0.0


Blood Cells %


Immature                   0.050  H


Granulocytes #


Neutrophils #                9.7  H


Lymphocytes #                0.7  L


Monocytes #                   0.3


Eosinophils #                 0.0


Basophils #                   0.0


Nucleated Red                 0.0


Blood Cells #


Sodium Level                  136


Potassium Level               4.8


Chloride Level                103


Carbon Dioxide                 21


Level


Anion Gap                      12


Blood Urea                     13


Nitrogen


Creatinine                   1.01


Est Glomerular     > 60  
           
              
              



Filtrat


Rate
mL/min


Glucose Level                266  H


Calcium Level                8.2  L


Bedside Glucose                             271  H         270  H         228  H








Medications


Medication





Current Medications


IV Flush (NS 3 ml) 3 ml PER PROTOCOL IV ;  Start 3/2/19 at 00:00


Zolpidem Tartrate (Ambien) 10 mg QHS  PRN PO .INSOMNIA Last administered on 


3/12/19at 01:14; Admin Dose 10 MG;  Start 3/2/19 at 00:00


Albuterol/ Ipratropium (Duoneb) 3 ml Q2H RESP THERAPY  PRN HHN SHORTNESS OF 


BREATH;  Start 3/2/19 at 00:00


Miscellaneous Information 1 ea NOTE XX ;  Start 3/2/19 at 00:00


Glucose (Glutose) 15 gm Q15M  PRN PO DECREASED GLUCOSE;  Start 3/2/19 at 00:00


Glucose (Glutose) 22.5 gm Q15M  PRN PO DECREASED GLUCOSE;  Start 3/2/19 at 00:00


Dextrose (D50w Syringe) 25 ml Q15M  PRN IV DECREASED GLUCOSE;  Start 3/2/19 at 


00:00


Dextrose (D50w Syringe) 50 ml Q15M  PRN IV DECREASED GLUCOSE;  Start 3/2/19 at 


00:00


Glucagon (Glucagen) 1 mg Q15M  PRN IM DECREASED GLUCOSE;  Start 3/2/19 at 00:00


Glucose (Glutose) 15 gm Q15M  PRN BUCCAL DECREASED GLUCOSE;  Start 3/2/19 at 


00:00


Insulin Aspart (Novolog Insulin Pen) 8 unit WITH  MEALS SC  Last administered on


3/11/19at 17:58; Admin Dose 8 UNIT;  Start 3/9/19 at 18:00


Insulin Glargine (Lantus) 25 units DAILY@0800 SC  Last administered on 3/13/19at


09:47; Admin Dose 25 UNITS;  Start 3/11/19 at 08:00


Hydromorphone HCl (Dilaudid) 1 mg Q4H  PRN IV SEVERE PAIN LEVEL 7-10 Last 


administered on 3/12/19at 14:16; Admin Dose 1 MG;  Start 3/11/19 at 17:00


Insulin Aspart (Novolog Insulin Pen) NOVOLOG *MODERATE* ALGORITHM Q4 SC  Last 


administered on 3/13/19at 13:08; Admin Dose 6 UNIT;  Start 3/12/19 at 09:00


Sodium Chloride 1,000 ml @  100 mls/hr Q10H IV  Last administered on 3/13/19at 


12:28; Admin Dose 100 MLS/HR;  Start 3/12/19 at 10:00


Cefazolin Sodium/ Dextrose 50 ml @  100 mls/hr Q8H IVPB  Last administered on 


3/13/19at 10:03; Admin Dose 100 MLS/HR;  Start 3/12/19 at 18:30;  Stop 3/13/19 


at 18:29


Metronidazole 100 ml @  100 mls/hr Q8H IVPB  Last administered on 3/13/19at 


12:25; Admin Dose 100 MLS/HR;  Start 3/12/19 at 18:30;  Stop 3/13/19 at 18:29


Morphine Sulfate (morphine) 2 mg Q2H  PRN IV breakthrough pain;  Start 3/12/19 


at 18:30


Acetaminophen/ Hydrocodone Bitart (Norco (5/325)) 1 tab Q6H  PRN PO PAIN LEVEL 


6-10;  Start 3/12/19 at 18:30


Acetaminophen (Tylenol Tab) 650 mg Q6H  PRN PO MILD PAIN(1-3)OR ELEVATED TEMP;  


Start 3/12/19 at 18:30


Ibuprofen (Motrin) 600 mg Q6H  PRN PO PAIN LEVEL 1-5;  Start 3/12/19 at 18:30


Ondansetron HCl (Zofran Inj) 4 mg Q6H  PRN IV NAUSEA AND/OR VOMITING Last 


administered on 3/13/19at 01:31; Admin Dose 4 MG;  Start 3/12/19 at 18:30


Hydromorphone HCl (Dilaudid) 0.2 mg Q2H  PRN IV .PAIN 1-5;  Start 3/12/19 at 


19:00;  Stop 3/13/19 at 19:37


Hydromorphone HCl (Dilaudid) 0.4 mg Q2H  PRN IV .PAIN 6-10;  Start 3/12/19 at 


19:00;  Stop 3/13/19 at 19:37


Morphine Sulfate (morphine) 2 mg Q2H  PRN IV .PAIN 1-5;  Start 3/12/19 at 19:00;


 Stop 3/13/19 at 19:37


Morphine Sulfate (morphine) 4 mg Q2H  PRN IV .PAIN 6-10;  Start 3/12/19 at 


19:00;  Stop 3/13/19 at 19:37


Diphenhydramine HCl (Benadryl) 25 mg Q4H  PRN IV .PRURITUS;  Start 3/12/19 at 


19:00;  Stop 3/13/19 at 19:37


Nalbuphine HCl (Nubain) 10 mg Q4H  PRN IV .PRURITUS;  Start 3/12/19 at 19:00;  


Stop 3/13/19 at 19:37


Ondansetron HCl (Zofran Inj) 4 mg Q6H  PRN IV .NAUSEA/VOMITING;  Start 3/12/19 


at 19:00;  Stop 3/13/19 at 19:37


Naloxone HCl (Narcan) 0.2 mg Q2M  PRN IV .RESP RATE;  Start 3/12/19 at 19:00;  


Stop 3/13/19 at 19:37


Fentanyl/ Ropivacaine 100 ml CONT EPIDURAL EPI  Last administered on 3/13/19at 


10:03; Admin Dose 100 ML;  Start 3/12/19 at 19:00


Hydromorphone HCl (Dilaudid) 0.2 mg Q2H  PRN IV .PAIN 1-5;  Start 3/12/19 at 


21:00;  Stop 3/13/19 at 22:22


Hydromorphone HCl (Dilaudid) 0.4 mg Q2H  PRN IV .PAIN 6-10;  Start 3/12/19 at 


21:00;  Stop 3/13/19 at 22:22


Ketorolac Tromethamine (Toradol) 15 mg Q6H  PRN IV .PAIN 6-10;  Start 3/12/19 at


21:00;  Stop 3/13/19 at 21:00


Celecoxib (Celebrex) 200 mg DAILY PO ;  Start 3/13/19 at 09:00;  Stop 3/15/19 at


09:01


Acetaminophen (Tylenol Tab) 500 mg Q4H  PRN PO .PAIN 1-3;  Start 3/12/19 at 21:0


0;  Stop 3/13/19 at 22:22


Acetaminophen/ Hydrocodone Bitart (Norco (5/325)) 1 tab Q4H  PRN PO .PAIN 4-6;  


Start 3/12/19 at 21:00;  Stop 3/13/19 at 22:22


Trimethobenzamide HCl (Tigan) 200 mg Q6H  PRN IM .NAUSEA/VOMITING;  Start 


3/12/19 at 21:00;  Stop 3/13/19 at 22:22


Zolpidem Tartrate (Ambien) 5 mg HS MAY REPEAT X 1  PRN PO .INSOMNIA;  Start 


3/12/19 at 21:00;  Stop 3/13/19 at 22:22


Miscellaneous Information (* Miscellaneous Pharmacy Order) DURAMORPH: 5 MG 


EPIDU... GIVEN NEURAXIAL XX ;  Start 3/12/19 at 21:00


Famotidine (Pepcid Iv) 20 mg DAILY IV  Last administered on 3/13/19at 09:43; 


Admin Dose 20 MG;  Start 3/13/19 at 09:00











LUÍS LOZANO                Mar 13, 2019 16:44

## 2019-03-13 NOTE — PN
Date/Time of Note


Date/Time of Note


DATE: 3/13/19 


TIME: 08:10





Assessment/Plan


Lines/Catheters


IV Catheter Type (from Nrs):  Saline Lock


Amaya in Place (from Nrs):  Yes





Assessment/Plan


Chief Complaint/Hosp Course


1.  Transverse colon adenocarcinoma,  Previous small bowel resection with 


primary anastomosis (antecolic over the cancer) and gastrojejunostomy; Ventral 


incisional Swiss cheese type hernia: s/p   Laparoscopic converted to open 


transverse colectomy, Revision of previous scar, Primary repair of ventral 


incisional Swiss cheese type hernia 3/12/19


-IS


-ambulate once off epidural


-ice pack to abdominal wall


-npo and ngt to liws until bowel function


-incision site local care


-follow path


-adrien drain


2.  40 pound weight loss secondary to above


3.  Diabetes mellitus type 2


-Nutrition medication optimization


4.  Hypertension


-Nutrition medication optimization


5.  Coronary artery disease with history of Plavix and aspirin


-Cardiac evaluation and optimization


6.  Iron deficiency anemia secondary to above


-Iron transfusion per hematology


-As above





Thank you. Patient seen and examined in collaboration with Dr. Helder Dugan.





Subjective


24 Hr Interval Summary


S/p transverse colectomy yesterday. Feels ok. Pain/discomfort tolerable. 


Currently on epidural. No fevers, chills, sob, congested cough, cp, 


palpitations, ha, dizziness, n/v/d/dysuria.





Exam/Review of Systems


Vital Signs


Vitals





Vital Signs


  Date      Temp  Pulse  Resp  B/P (MAP)   Pulse Ox  O2          O2 Flow    FiO2


Time                                                 Delivery    Rate


   3/13/19  97.8     83    18  90/58 (69)        97  Room Air


     07:43


   3/12/19                                                             2.0


     22:45








Intake and Output





3/12/19


3/12/19


3/13/19





1515:00


23:00


07:00





IntakeIntake Total


6900 ml


1450 ml





OutputOutput Total


950 ml


25 ml





BalanceBalance


5950 ml


1425 ml














Exam


Free Text/Dictation


Constitutional:  alert, oriented; 


   No distress


Psych:  nl mood/affect; 


   No anxiety


Head:  normocephalic, atraumatic


Eyes:  nl conjunctiva, EOMI, PERRL; 


   No icteric


ENMT:  nl external ears & nose, mucosa pink and moist, ngt


Neck:  non-tender; 


   No jvd


Respiratory:  normal air movement; 


   No congested cough, No labored breathing


Cardiovascular:  regular rate and rhythm; 


   No edema


Gastrointestinal:  soft, tender (tanner-incisional: midline staple line: dry, no 


erythema, scant drainage | ADRIEN: serosang)


   No distended, No rebound or guarding


Genitourinary - Male:  nl scrotum, amaya


Musculoskeletal:  nl extremities to inspection, nl gait and stance; 


   No joint tenderness


Extremities:  normal pulses; 


   No calf tenderness, No edema


Neurological:  nl mental status, nl speech, nl strength


Skin:  nl turgor; 


   No rash or lesions


Lymph:  nl lymph nodes





Results


Result Diagram:  


3/13/19 0427                                                                    


           3/13/19 0427














PATRICIA ASHRAF NP       Mar 13, 2019 08:15

## 2019-03-13 NOTE — PN
Date/Time of Note


Date/Time of Note


DATE: 3/13/19 


TIME: 19:05





Assessment/Plan


VTE Prophylaxis


Risk score (from [facility])>0 risk:  6


SCD applied (from [facility]):  Yes


Pharmacological prophylaxis:  NA/contraindicated


Pharm contraindication:  surgical contra





Lines/Catheters


IV Catheter Type (from [facility]):  Saline Lock





Assessment/Plan


Hospital Course


59 yo male with DM who presented with hematochezia.  Workup has revealed colon 


adenocarcinoma





Colon adenocarcinoma:


- Bone scan negative for mets


- s/p colonoscopy with path showing adenocarcinoma


- Dr Mcintyre following


-Status post Laparoscopic converted to open transverse colectomy postop day #1 


with Dr. Dugan





Iron deficiency anemia:


- IV iron





Diabetes


- Basal/bolus insulin





CKD II:


- stable





Prophylaxis: SCDs





DC planning: Patient is postop day #1, continue postop care, plan is for 


ultimate DC to home


Result Diagram:  


3/13/19 0427                                                                    


           3/13/19 0427





Results 24hrs





Laboratory Tests


Test
                 3/12/19
22:27  3/12/19
22:30  3/13/19
00:42  3/13/19
04:27


Bedside Glucose               176                          241  H


Urine Color                          STRAW


Urine Clarity                        CLEAR


Urine pH                                     6.0


Urine Specific                             1.008


Gravity


Urine Ketones                        NEGATIVE


Urine Nitrite                        NEGATIVE


Urine Bilirubin                      NEGATIVE


Urine Urobilinogen                   NEGATIVE


Urine Leukocyte                      NEGATIVE


Esterase


Urine Hemoglobin                     NEGATIVE


Urine Glucose                                1+  H


Urine Total Protein                  NEGATIVE


White Blood Count                                                        10.7  #


Red Blood Count                                                          4.06  L


Hemoglobin                                                               10.1  L


Hematocrit                                                               32.9  L


Mean Corpuscular                                                         81.0  L


Volume


Mean Corpuscular                                                         24.9  L


Hemoglobin


Mean Corpuscular      
              
              
                   30.7  L



Hemoglobin
Concent


Red Cell                                                                 16.5  H


Distribution Width


Platelet Count                                                             313


Mean Platelet Volume                                                      10.3


Immature                                                                0.500  H


Granulocytes %


Neutrophils %                                                            91.0  H


Lymphocytes %                                                             6.1  L


Monocytes %                                                                2.3


Eosinophils %                                                              0.0


Basophils %                                                                0.1


Nucleated Red Blood                                                        0.0


Cells %


Immature                                                                0.050  H


Granulocytes #


Neutrophils #                                                             9.7  H


Lymphocytes #                                                             0.7  L


Monocytes #                                                                0.3


Eosinophils #                                                              0.0


Basophils #                                                                0.0


Nucleated Red Blood                                                        0.0


Cells #


Sodium Level                                                               136


Potassium Level                                                            4.8


Chloride Level                                                             103


Carbon Dioxide Level                                                        21


Anion Gap                                                                   12


Blood Urea Nitrogen                                                         13


Creatinine                                                                1.01


Est Glomerular        
              
              
              > 60  



Filtrat Rate
mL/min


Glucose Level                                                             266  H


Calcium Level                                                             8.2  L


Test
                 3/13/19
05:30  3/13/19
09:26  3/13/19
13:00  3/13/19
16:54


Bedside Glucose              271  H         270  H         228  H          189








Subjective


24 Hr Interval Summary


Gastrointestinal:  pain





Exam/Review of Systems


Exam


Vitals





Vital Signs


  Date      Temp  Pulse  Resp  B/P (MAP)   Pulse Ox  O2          O2 Flow    FiO2


Time                                                 Delivery    Rate


   3/13/19                 14


     17:18


   3/13/19  97.8     79        93/56 (68)        98  Room Air


     13:19


   3/12/19                                                             2.0


     22:45








Intake and Output





3/12/19


3/12/19


3/13/19





1515:00


23:00


07:00





IntakeIntake Total


6900 ml


1450 ml





OutputOutput Total


950 ml


25 ml





BalanceBalance


5950 ml


1425 ml











Constitutional:  alert, oriented


Respiratory:  clear to auscultation


Cardiovascular:  regular rate and rhythm


Gastrointestinal:  soft; 


   No distended


Musculoskeletal:  nl extremities to inspection





Results


Results 24hrs





Laboratory Tests


Test
                 3/12/19
22:27  3/12/19
22:30  3/13/19
00:42  3/13/19
04:27


Bedside Glucose               176                          241  H


Urine Color                          STRAW


Urine Clarity                        CLEAR


Urine pH                                     6.0


Urine Specific                             1.008


Gravity


Urine Ketones                        NEGATIVE


Urine Nitrite                        NEGATIVE


Urine Bilirubin                      NEGATIVE


Urine Urobilinogen                   NEGATIVE


Urine Leukocyte                      NEGATIVE


Esterase


Urine Hemoglobin                     NEGATIVE


Urine Glucose                                1+  H


Urine Total Protein                  NEGATIVE


White Blood Count                                                        10.7  #


Red Blood Count                                                          4.06  L


Hemoglobin                                                               10.1  L


Hematocrit                                                               32.9  L


Mean Corpuscular                                                         81.0  L


Volume


Mean Corpuscular                                                         24.9  L


Hemoglobin


Mean Corpuscular      
              
              
                   30.7  L



Hemoglobin
Concent


Red Cell                                                                 16.5  H


Distribution Width


Platelet Count                                                             313


Mean Platelet Volume                                                      10.3


Immature                                                                0.500  H


Granulocytes %


Neutrophils %                                                            91.0  H


Lymphocytes %                                                             6.1  L


Monocytes %                                                                2.3


Eosinophils %                                                              0.0


Basophils %                                                                0.1


Nucleated Red Blood                                                        0.0


Cells %


Immature                                                                0.050  H


Granulocytes #


Neutrophils #                                                             9.7  H


Lymphocytes #                                                             0.7  L


Monocytes #                                                                0.3


Eosinophils #                                                              0.0


Basophils #                                                                0.0


Nucleated Red Blood                                                        0.0


Cells #


Sodium Level                                                               136


Potassium Level                                                            4.8


Chloride Level                                                             103


Carbon Dioxide Level                                                        21


Anion Gap                                                                   12


Blood Urea Nitrogen                                                         13


Creatinine                                                                1.01


Est Glomerular        
              
              
              > 60  



Filtrat Rate
mL/min


Glucose Level                                                             266  H


Calcium Level                                                             8.2  L


Test
                 3/13/19
05:30  3/13/19
09:26  3/13/19
13:00  3/13/19
16:54


Bedside Glucose              271  H         270  H         228  H          189








Medications


Medication





Current Medications


IV Flush (NS 3 ml) 3 ml PER PROTOCOL IV ;  Start 3/2/19 at 00:00


Zolpidem Tartrate (Ambien) 10 mg QHS  PRN PO .INSOMNIA Last administered on 


3/12/19at 01:14; Admin Dose 10 MG;  Start 3/2/19 at 00:00


Albuterol/ Ipratropium (Duoneb) 3 ml Q2H RESP THERAPY  PRN HHN SHORTNESS OF 


BREATH;  Start 3/2/19 at 00:00


Miscellaneous Information 1 ea NOTE XX ;  Start 3/2/19 at 00:00


Glucose (Glutose) 15 gm Q15M  PRN PO DECREASED GLUCOSE;  Start 3/2/19 at 00:00


Glucose (Glutose) 22.5 gm Q15M  PRN PO DECREASED GLUCOSE;  Start 3/2/19 at 00:00


Dextrose (D50w Syringe) 25 ml Q15M  PRN IV DECREASED GLUCOSE;  Start 3/2/19 at 


00:00


Dextrose (D50w Syringe) 50 ml Q15M  PRN IV DECREASED GLUCOSE;  Start 3/2/19 at 


00:00


Glucagon (Glucagen) 1 mg Q15M  PRN IM DECREASED GLUCOSE;  Start 3/2/19 at 00:00


Glucose (Glutose) 15 gm Q15M  PRN BUCCAL DECREASED GLUCOSE;  Start 3/2/19 at 


00:00


Insulin Aspart (Novolog Insulin Pen) 8 unit WITH  MEALS SC  Last administered on


3/11/19at 17:58; Admin Dose 8 UNIT;  Start 3/9/19 at 18:00


Insulin Glargine (Lantus) 25 units DAILY@0800 SC  Last administered on 3/13/19at


09:47; Admin Dose 25 UNITS;  Start 3/11/19 at 08:00


Hydromorphone HCl (Dilaudid) 1 mg Q4H  PRN IV SEVERE PAIN LEVEL 7-10 Last 


administered on 3/12/19at 14:16; Admin Dose 1 MG;  Start 3/11/19 at 17:00


Insulin Aspart (Novolog Insulin Pen) NOVOLOG *MODERATE* ALGORITHM Q4 SC  Last 


administered on 3/13/19at 17:02; Admin Dose 4 UNIT;  Start 3/12/19 at 09:00


Sodium Chloride 1,000 ml @  100 mls/hr Q10H IV  Last administered on 3/13/19at 


12:28; Admin Dose 100 MLS/HR;  Start 3/12/19 at 10:00


Morphine Sulfate (morphine) 2 mg Q2H  PRN IV breakthrough pain;  Start 3/12/19 


at 18:30


Acetaminophen/ Hydrocodone Bitart (Norco (5/325)) 1 tab Q6H  PRN PO PAIN LEVEL 


6-10;  Start 3/12/19 at 18:30


Acetaminophen (Tylenol Tab) 650 mg Q6H  PRN PO MILD PAIN(1-3)OR ELEVATED TEMP;  


Start 3/12/19 at 18:30


Ibuprofen (Motrin) 600 mg Q6H  PRN PO PAIN LEVEL 1-5;  Start 3/12/19 at 18:30


Ondansetron HCl (Zofran Inj) 4 mg Q6H  PRN IV NAUSEA AND/OR VOMITING Last admini


stered on 3/13/19at 18:58; Admin Dose 4 MG;  Start 3/12/19 at 18:30


Hydromorphone HCl (Dilaudid) 0.2 mg Q2H  PRN IV .PAIN 1-5;  Start 3/12/19 at 


19:00;  Stop 3/13/19 at 19:37


Hydromorphone HCl (Dilaudid) 0.4 mg Q2H  PRN IV .PAIN 6-10;  Start 3/12/19 at 


19:00;  Stop 3/13/19 at 19:37


Morphine Sulfate (morphine) 2 mg Q2H  PRN IV .PAIN 1-5;  Start 3/12/19 at 19:00;


 Stop 3/13/19 at 19:37


Morphine Sulfate (morphine) 4 mg Q2H  PRN IV .PAIN 6-10;  Start 3/12/19 at 


19:00;  Stop 3/13/19 at 19:37


Diphenhydramine HCl (Benadryl) 25 mg Q4H  PRN IV .PRURITUS;  Start 3/12/19 at 


19:00;  Stop 3/13/19 at 19:37


Nalbuphine HCl (Nubain) 10 mg Q4H  PRN IV .PRURITUS;  Start 3/12/19 at 19:00;  


Stop 3/13/19 at 19:37


Ondansetron HCl (Zofran Inj) 4 mg Q6H  PRN IV .NAUSEA/VOMITING;  Start 3/12/19 


at 19:00;  Stop 3/13/19 at 19:37


Naloxone HCl (Narcan) 0.2 mg Q2M  PRN IV .RESP RATE;  Start 3/12/19 at 19:00;  


Stop 3/13/19 at 19:37


Fentanyl/ Ropivacaine 100 ml CONT EPIDURAL EPI  Last administered on 3/13/19at 


10:03; Admin Dose 100 ML;  Start 3/12/19 at 19:00


Hydromorphone HCl (Dilaudid) 0.2 mg Q2H  PRN IV .PAIN 1-5;  Start 3/12/19 at 


21:00;  Stop 3/13/19 at 22:22


Hydromorphone HCl (Dilaudid) 0.4 mg Q2H  PRN IV .PAIN 6-10;  Start 3/12/19 at 


21:00;  Stop 3/13/19 at 22:22


Ketorolac Tromethamine (Toradol) 15 mg Q6H  PRN IV .PAIN 6-10;  Start 3/12/19 at


21:00;  Stop 3/13/19 at 21:00


Celecoxib (Celebrex) 200 mg DAILY PO ;  Start 3/13/19 at 09:00;  Stop 3/15/19 at


09:01


Acetaminophen (Tylenol Tab) 500 mg Q4H  PRN PO .PAIN 1-3;  Start 3/12/19 at 


21:00;  Stop 3/13/19 at 22:22


Acetaminophen/ Hydrocodone Bitart (Norco (5/325)) 1 tab Q4H  PRN PO .PAIN 4-6;  


Start 3/12/19 at 21:00;  Stop 3/13/19 at 22:22


Trimethobenzamide HCl (Tigan) 200 mg Q6H  PRN IM .NAUSEA/VOMITING;  Start 


3/12/19 at 21:00;  Stop 3/13/19 at 22:22


Zolpidem Tartrate (Ambien) 5 mg HS MAY REPEAT X 1  PRN PO .INSOMNIA;  Start 


3/12/19 at 21:00;  Stop 3/13/19 at 22:22


Miscellaneous Information (* Miscellaneous Pharmacy Order) DURAMORPH: 5 MG EPID


U... GIVEN NEURAXIAL XX ;  Start 3/12/19 at 21:00


Famotidine (Pepcid Iv) 20 mg DAILY IV  Last administered on 3/13/19at 09:43; 


Admin Dose 20 MG;  Start 3/13/19 at 09:00


Phenol (Chloraseptic Throat Spray) 2 spray Q2H  PRN MT SORE THROAT;  Start 


3/13/19 at 17:30











FELICIA MELENDEZ                  Mar 13, 2019 19:08

## 2019-03-13 NOTE — CONS
Assessment/Plan


Assessment/Plan


Hospital Course (Demo Recall)


IMPRESSION:


1.  Preoperative evaluation in a patient prior to colectomy that has multiple 


cardiac risk factors and possible prior stent apparently placed approximately 2 


to 3 years prior.-neg trop x 2. Now POD#1 s/p surgery


2.  Hypotension, borderline.-ongoing today


3.  History of possible prior stent placements 2 to 3 years prior per chart 


biopsy.


4.  Dyslipidemia.


5.  Adenocarcinoma of the colon, newly diagnosed.


6.  Abdominal pain secondary to adenocarcinoma of the colon.


7.  Diabetes mellitus.


8.  Anemia.


9.  Renal failure insufficiency.





Recc:


-Follow BP/volume status clsoely


-routine post-op care


-pain control


-Follow BS





Consultation Date/Type/Reason


Admit Date/Time


Mar 1, 2019 at 20:38


Initial Consult Date


3/10/19


Type of Consult


Cardiology


Reason for Consultation


HTN


Requesting Provider:  HANNAH DOWNING MD


Date/Time of Note


DATE: 3/13/19 


TIME: 14:45





Exam/Review of Systems


Vital Signs


Vitals





Vital Signs


  Date      Temp  Pulse  Resp  B/P (MAP)   Pulse Ox  O2          O2 Flow    FiO2


Time                                                 Delivery    Rate


   3/13/19  97.8     79    16  93/56 (68)        98  Room Air


     13:19


   3/12/19                                                             2.0


     22:45








Intake and Output





3/12/19


3/12/19


3/13/19





1515:00


23:00


07:00





IntakeIntake Total


6900 ml


1450 ml





OutputOutput Total


950 ml


25 ml





BalanceBalance


5950 ml


1425 ml














Exam


Exam


Review of Systems:


CONSTITUTIONAL:  No fevers, chills.


PULMONARY:  No sob


CARDIOVASCULAR: No chest pain/palpitations


GASTROINTESTINAL:  No nausea/vomiting.


GENITOURINARY:  No hematuria/dysuria.


MUSCULOSKELETAL:  No myagias/arthalgias.


PSYCHIATRIC:  The patient denies depression.


NEUROLOGIC:   No weakness


Constitutional:  alert


Psych:  no complaints


ENMT:  other (NGT in place)


Neck:  supple, jvd (9 cm water)


Respiratory:  diminished breath sounds (at bases/B)


Cardiovascular:  regular rate and rhythm


Gastrointestinal:  soft, surgical scars (midline incision well healing)


Musculoskeletal:  muscle tone (normal)


Extremities:  edema (none)


Neurological:  other (No focal deficits)





Labs


Result Diagram:  


3/13/19 0427                                                                    


           3/13/19 0427





Results 24hrs





Laboratory Tests


Test
              3/12/19
15:40     3/12/19
18:51  3/12/19
22:27  3/12/19
22:30


Platelet Count             339


Prothrombin Time          13.7


Prothrombin Time           1.1


Ratio


[employer]        1.04  
  
                 
              



Normalized
Ratio


Activated               38.2  H
  
                 
              



Partial
Thrombopl


ast Time


Thrombin Time             14.2


Blood Gas          
              Blood arterial
   
              



Specimen Source



Arterial Blood     
              3/12/2019
7:02:5  
              



Date Drawn
                                   3 PM


Arterial Blood pH  
                     7.307  L
  
              



(Temp
corrected)


Arterial Blood     
                       42.5  
  
              



pCO2


(Temp
correct)


Arterial Blood     
                     195.6  H
  
              



pO2


(Temp
corrected)


Arterial Blood                             20.8  L


HCO3


Arterial Blood                             -5.3  L


Base Excess


Arterial Blood     
                      98.9  H
  
              



Oxygen
Saturation


Zach Test                        N/A


Arterial Blood     
              A-Line  
         
              



Gas Puncture
Site


Arterial           
                        0.2  
  
              



Blood
Carboxyhemo


globin


Arterial Blood                               0.2


Methemoglobin


Blood Gas A-a O2                          474.9  H


Differential


Oxyhemoglobin                               98.5


Percent


Blood Gas                                   37.0


Temperature


Blood Gas                         VENT - AC


Modality


FiO2                                       100.0


Blood Gas          
              R
Kathy   
              



Notified Whom



Blood Gas          
              3/12/2019
7:18:0  
              



Notified Time
                                4 PM


Bedside Glucose                                             176


Urine Color                                                        STRAW


Urine Clarity                                                      CLEAR


Urine pH                                                                   6.0


Urine Specific                                                           1.008


Gravity


Urine Ketones                                                      NEGATIVE


Urine Nitrite                                                      NEGATIVE


Urine Bilirubin                                                    NEGATIVE


Urine                                                              NEGATIVE


Urobilinogen


Urine Leukocyte                                                    NEGATIVE


Esterase


Urine Hemoglobin                                                   NEGATIVE


Urine Glucose                                                              1+  H


Urine Total                                                        NEGATIVE


Protein


Test
              3/13/19
00:42     3/13/19
04:27  3/13/19
05:30  3/13/19
09:26


Bedside Glucose           241  H                           271  H         270  H


White Blood Count                          10.7  #


Red Blood Count                            4.06  L


Hemoglobin                                 10.1  L


Hematocrit                                 32.9  L


Mean Corpuscular                           81.0  L


Volume


Mean Corpuscular                           24.9  L


Hemoglobin


Mean Corpuscular   
                      30.7  L
  
              



Hemoglobin
Concen


t


Red Cell                                   16.5  H


Distribution


Width


Platelet Count                               313


Mean Platelet                               10.3


Volume


Immature                                  0.500  H


Granulocytes %


Neutrophils %                              91.0  H


Lymphocytes %                               6.1  L


Monocytes %                                  2.3


Eosinophils %                                0.0


Basophils %                                  0.1


Nucleated Red                                0.0


Blood Cells %


Immature                                  0.050  H


Granulocytes #


Neutrophils #                               9.7  H


Lymphocytes #                               0.7  L


Monocytes #                                  0.3


Eosinophils #                                0.0


Basophils #                                  0.0


Nucleated Red                                0.0


Blood Cells #


Sodium Level                                 136


Potassium Level                              4.8


Chloride Level                               103


Carbon Dioxide                                21


Level


Anion Gap                                     12


Blood Urea                                    13


Nitrogen


Creatinine                                  1.01


Est Glomerular     
              > 60  
           
              



Filtrat


Rate
mL/min


Glucose Level                               266  H


Calcium Level                               8.2  L


Test
              3/13/19
13:00  
                 
              



Bedside Glucose           228  H








Medications


Medications





Current Medications


IV Flush (NS 3 ml) 3 ml PER PROTOCOL IV ;  Start 3/2/19 at 00:00


Zolpidem Tartrate (Ambien) 10 mg QHS  PRN PO .INSOMNIA Last administered on 


3/12/19at 01:14; Admin Dose 10 MG;  Start 3/2/19 at 00:00


Albuterol/ Ipratropium (Duoneb) 3 ml Q2H RESP THERAPY  PRN HHN SHORTNESS OF 


BREATH;  Start 3/2/19 at 00:00


Miscellaneous Information 1 ea NOTE XX ;  Start 3/2/19 at 00:00


Glucose (Glutose) 15 gm Q15M  PRN PO DECREASED GLUCOSE;  Start 3/2/19 at 00:00


Glucose (Glutose) 22.5 gm Q15M  PRN PO DECREASED GLUCOSE;  Start 3/2/19 at 00:00


Dextrose (D50w Syringe) 25 ml Q15M  PRN IV DECREASED GLUCOSE;  Start 3/2/19 at 


00:00


Dextrose (D50w Syringe) 50 ml Q15M  PRN IV DECREASED GLUCOSE;  Start 3/2/19 at 


00:00


Glucagon (Glucagen) 1 mg Q15M  PRN IM DECREASED GLUCOSE;  Start 3/2/19 at 00:00


Glucose (Glutose) 15 gm Q15M  PRN BUCCAL DECREASED GLUCOSE;  Start 3/2/19 at 


00:00


Insulin Aspart (Novolog Insulin Pen) 8 unit WITH  MEALS SC  Last administered on


3/11/19at 17:58; Admin Dose 8 UNIT;  Start 3/9/19 at 18:00


Insulin Glargine (Lantus) 25 units DAILY@0800 SC  Last administered on 3/13/19at


09:47; Admin Dose 25 UNITS;  Start 3/11/19 at 08:00


Hydromorphone HCl (Dilaudid) 1 mg Q4H  PRN IV SEVERE PAIN LEVEL 7-10 Last 


administered on 3/12/19at 14:16; Admin Dose 1 MG;  Start 3/11/19 at 17:00


Insulin Aspart (Novolog Insulin Pen) NOVOLOG *MODERATE* ALGORITHM Q4 SC  Last 


administered on 3/13/19at 13:08; Admin Dose 6 UNIT;  Start 3/12/19 at 09:00


Sodium Chloride 1,000 ml @  100 mls/hr Q10H IV  Last administered on 3/13/19at 


12:28; Admin Dose 100 MLS/HR;  Start 3/12/19 at 10:00


Cefazolin Sodium/ Dextrose 50 ml @  100 mls/hr Q8H IVPB  Last administered on 3/


13/19at 10:03; Admin Dose 100 MLS/HR;  Start 3/12/19 at 18:30;  Stop 3/13/19 at 


18:29


Metronidazole 100 ml @  100 mls/hr Q8H IVPB  Last administered on 3/13/19at 


12:25; Admin Dose 100 MLS/HR;  Start 3/12/19 at 18:30;  Stop 3/13/19 at 18:29


Morphine Sulfate (morphine) 2 mg Q2H  PRN IV breakthrough pain;  Start 3/12/19 


at 18:30


Acetaminophen/ Hydrocodone Bitart (Norco (5/325)) 1 tab Q6H  PRN PO PAIN LEVEL 


6-10;  Start 3/12/19 at 18:30


Acetaminophen (Tylenol Tab) 650 mg Q6H  PRN PO MILD PAIN(1-3)OR ELEVATED TEMP;  


Start 3/12/19 at 18:30


Ibuprofen (Motrin) 600 mg Q6H  PRN PO PAIN LEVEL 1-5;  Start 3/12/19 at 18:30


Ondansetron HCl (Zofran Inj) 4 mg Q6H  PRN IV NAUSEA AND/OR VOMITING Last 


administered on 3/13/19at 01:31; Admin Dose 4 MG;  Start 3/12/19 at 18:30


Hydromorphone HCl (Dilaudid) 0.2 mg Q2H  PRN IV .PAIN 1-5;  Start 3/12/19 at 


19:00;  Stop 3/13/19 at 19:37


Hydromorphone HCl (Dilaudid) 0.4 mg Q2H  PRN IV .PAIN 6-10;  Start 3/12/19 at 


19:00;  Stop 3/13/19 at 19:37


Morphine Sulfate (morphine) 2 mg Q2H  PRN IV .PAIN 1-5;  Start 3/12/19 at 19:00;


 Stop 3/13/19 at 19:37


Morphine Sulfate (morphine) 4 mg Q2H  PRN IV .PAIN 6-10;  Start 3/12/19 at 


19:00;  Stop 3/13/19 at 19:37


Diphenhydramine HCl (Benadryl) 25 mg Q4H  PRN IV .PRURITUS;  Start 3/12/19 at 


19:00;  Stop 3/13/19 at 19:37


Nalbuphine HCl (Nubain) 10 mg Q4H  PRN IV .PRURITUS;  Start 3/12/19 at 19:00;  


Stop 3/13/19 at 19:37


Ondansetron HCl (Zofran Inj) 4 mg Q6H  PRN IV .NAUSEA/VOMITING;  Start 3/12/19 


at 19:00;  Stop 3/13/19 at 19:37


Naloxone HCl (Narcan) 0.2 mg Q2M  PRN IV .RESP RATE;  Start 3/12/19 at 19:00;  


Stop 3/13/19 at 19:37


Fentanyl/ Ropivacaine 100 ml CONT EPIDURAL EPI  Last administered on 3/13/19at 


10:03; Admin Dose 100 ML;  Start 3/12/19 at 19:00


Hydromorphone HCl (Dilaudid) 0.2 mg Q2H  PRN IV .PAIN 1-5;  Start 3/12/19 at 


21:00;  Stop 3/13/19 at 22:22


Hydromorphone HCl (Dilaudid) 0.4 mg Q2H  PRN IV .PAIN 6-10;  Start 3/12/19 at 


21:00;  Stop 3/13/19 at 22:22


Ketorolac Tromethamine (Toradol) 15 mg Q6H  PRN IV .PAIN 6-10;  Start 3/12/19 at


21:00;  Stop 3/13/19 at 21:00


Celecoxib (Celebrex) 200 mg DAILY PO ;  Start 3/13/19 at 09:00;  Stop 3/15/19 at


09:01


Acetaminophen (Tylenol Tab) 500 mg Q4H  PRN PO .PAIN 1-3;  Start 3/12/19 at 


21:00;  Stop 3/13/19 at 22:22


Acetaminophen/ Hydrocodone Bitart (Norco (5/325)) 1 tab Q4H  PRN PO .PAIN 4-6;  


Start 3/12/19 at 21:00;  Stop 3/13/19 at 22:22


Trimethobenzamide HCl (Tigan) 200 mg Q6H  PRN IM .NAUSEA/VOMITING;  Start 


3/12/19 at 21:00;  Stop 3/13/19 at 22:22


Zolpidem Tartrate (Ambien) 5 mg HS MAY REPEAT X 1  PRN PO .INSOMNIA;  Start 


3/12/19 at 21:00;  Stop 3/13/19 at 22:22


Miscellaneous Information (* Miscellaneous Pharmacy Order) DURAMORPH: 5 MG 


EPIDU... GIVEN NEURAXIAL XX ;  Start 3/12/19 at 21:00


Famotidine (Pepcid Iv) 20 mg DAILY IV  Last administered on 3/13/19at 09:43; 


Admin Dose 20 MG;  Start 3/13/19 at 09:00











SHERINE GOLDSMITH               Mar 13, 2019 14:48

## 2019-03-14 VITALS — HEART RATE: 75 BPM | SYSTOLIC BLOOD PRESSURE: 100 MMHG | RESPIRATION RATE: 20 BRPM | DIASTOLIC BLOOD PRESSURE: 60 MMHG

## 2019-03-14 VITALS — RESPIRATION RATE: 20 BRPM | HEART RATE: 77 BPM | DIASTOLIC BLOOD PRESSURE: 71 MMHG | SYSTOLIC BLOOD PRESSURE: 112 MMHG

## 2019-03-14 VITALS — DIASTOLIC BLOOD PRESSURE: 76 MMHG | SYSTOLIC BLOOD PRESSURE: 122 MMHG | RESPIRATION RATE: 18 BRPM | HEART RATE: 76 BPM

## 2019-03-14 VITALS — SYSTOLIC BLOOD PRESSURE: 130 MMHG | DIASTOLIC BLOOD PRESSURE: 77 MMHG | HEART RATE: 78 BPM | RESPIRATION RATE: 20 BRPM

## 2019-03-14 RX ADMIN — INSULIN GLARGINE SCH UNITS: 100 INJECTION, SOLUTION SUBCUTANEOUS at 12:35

## 2019-03-14 RX ADMIN — HYDROMORPHONE HYDROCHLORIDE PRN MG: 1 INJECTION, SOLUTION INTRAMUSCULAR; INTRAVENOUS; SUBCUTANEOUS at 17:38

## 2019-03-14 RX ADMIN — HYDROMORPHONE HYDROCHLORIDE PRN MG: 1 INJECTION, SOLUTION INTRAMUSCULAR; INTRAVENOUS; SUBCUTANEOUS at 15:41

## 2019-03-14 RX ADMIN — INSULIN ASPART 1 UNIT: 100 INJECTION, SOLUTION INTRAVENOUS; SUBCUTANEOUS at 20:37

## 2019-03-14 RX ADMIN — DIPHENHYDRAMINE HYDROCHLORIDE SCH MG: 50 INJECTION, SOLUTION INTRAMUSCULAR; INTRAVENOUS at 08:18

## 2019-03-14 RX ADMIN — FENTANYL CIT 0.2 MG/100ML-ROPIV 0.2%-NACL 0.9% EPIDURAL INJ SCH ML: 2/0.2 SOLUTION at 21:17

## 2019-03-14 RX ADMIN — HYDROMORPHONE HYDROCHLORIDE 1 MG: 1 INJECTION, SOLUTION INTRAMUSCULAR; INTRAVENOUS; SUBCUTANEOUS at 17:38

## 2019-03-14 RX ADMIN — HYDROMORPHONE HYDROCHLORIDE PRN MG: 1 INJECTION, SOLUTION INTRAMUSCULAR; INTRAVENOUS; SUBCUTANEOUS at 05:09

## 2019-03-14 RX ADMIN — THIAMINE HYDROCHLORIDE 1 MLS/HR: 100 INJECTION, SOLUTION INTRAMUSCULAR; INTRAVENOUS at 00:36

## 2019-03-14 RX ADMIN — INSULIN ASPART 1 UNIT: 100 INJECTION, SOLUTION INTRAVENOUS; SUBCUTANEOUS at 11:40

## 2019-03-14 RX ADMIN — INSULIN ASPART 1 UNIT: 100 INJECTION, SOLUTION INTRAVENOUS; SUBCUTANEOUS at 05:00

## 2019-03-14 RX ADMIN — ONDANSETRON HYDROCHLORIDE 1 MG: 2 INJECTION, SOLUTION INTRAMUSCULAR; INTRAVENOUS at 12:08

## 2019-03-14 RX ADMIN — INSULIN GLARGINE 1 UNITS: 100 INJECTION, SOLUTION SUBCUTANEOUS at 12:35

## 2019-03-14 RX ADMIN — CELECOXIB 1 MG: 200 CAPSULE ORAL at 07:58

## 2019-03-14 RX ADMIN — INSULIN ASPART 1 UNIT: 100 INJECTION, SOLUTION INTRAVENOUS; SUBCUTANEOUS at 08:27

## 2019-03-14 RX ADMIN — HYDROMORPHONE HYDROCHLORIDE 1 MG: 1 INJECTION, SOLUTION INTRAMUSCULAR; INTRAVENOUS; SUBCUTANEOUS at 05:09

## 2019-03-14 RX ADMIN — INSULIN ASPART 1 UNIT: 100 INJECTION, SOLUTION INTRAVENOUS; SUBCUTANEOUS at 17:25

## 2019-03-14 RX ADMIN — INSULIN ASPART 1 UNIT: 100 INJECTION, SOLUTION INTRAVENOUS; SUBCUTANEOUS at 07:50

## 2019-03-14 RX ADMIN — THIAMINE HYDROCHLORIDE 1 MLS/HR: 100 INJECTION, SOLUTION INTRAMUSCULAR; INTRAVENOUS at 10:17

## 2019-03-14 RX ADMIN — HYDROMORPHONE HYDROCHLORIDE 1 MG: 1 INJECTION, SOLUTION INTRAMUSCULAR; INTRAVENOUS; SUBCUTANEOUS at 08:45

## 2019-03-14 RX ADMIN — INSULIN ASPART 1 UNIT: 100 INJECTION, SOLUTION INTRAVENOUS; SUBCUTANEOUS at 12:29

## 2019-03-14 RX ADMIN — HYDROMORPHONE HYDROCHLORIDE PRN MG: 1 INJECTION, SOLUTION INTRAMUSCULAR; INTRAVENOUS; SUBCUTANEOUS at 08:45

## 2019-03-14 RX ADMIN — FAMOTIDINE 1 MG: 10 INJECTION, SOLUTION INTRAVENOUS at 08:18

## 2019-03-14 RX ADMIN — HYDROMORPHONE HYDROCHLORIDE 1 MG: 1 INJECTION, SOLUTION INTRAMUSCULAR; INTRAVENOUS; SUBCUTANEOUS at 15:41

## 2019-03-14 RX ADMIN — FENTANYL CIT 0.2 MG/100ML-ROPIV 0.2%-NACL 0.9% EPIDURAL INJ 1 ML: 2/0.2 SOLUTION at 21:17

## 2019-03-14 RX ADMIN — FOLIC ACID SCH MLS/HR: 5 INJECTION, SOLUTION INTRAMUSCULAR; INTRAVENOUS; SUBCUTANEOUS at 00:36

## 2019-03-14 RX ADMIN — DEXAMETHASONE SODIUM PHOSPHATE PRN MG: 10 INJECTION, SOLUTION INTRAMUSCULAR; INTRAVENOUS at 12:08

## 2019-03-14 RX ADMIN — INSULIN ASPART 1 UNIT: 100 INJECTION, SOLUTION INTRAVENOUS; SUBCUTANEOUS at 00:36

## 2019-03-14 RX ADMIN — INSULIN ASPART 1 UNIT: 100 INJECTION, SOLUTION INTRAVENOUS; SUBCUTANEOUS at 17:00

## 2019-03-14 RX ADMIN — FOLIC ACID SCH MLS/HR: 5 INJECTION, SOLUTION INTRAMUSCULAR; INTRAVENOUS; SUBCUTANEOUS at 10:17

## 2019-03-14 RX ADMIN — FENTANYL CIT 0.2 MG/100ML-ROPIV 0.2%-NACL 0.9% EPIDURAL INJ SCH ML: 2/0.2 SOLUTION at 10:53

## 2019-03-14 RX ADMIN — FENTANYL CIT 0.2 MG/100ML-ROPIV 0.2%-NACL 0.9% EPIDURAL INJ 1 ML: 2/0.2 SOLUTION at 10:53

## 2019-03-14 RX ADMIN — CELECOXIB SCH MG: 200 CAPSULE ORAL at 07:58

## 2019-03-14 NOTE — PN
Date/Time of Note


Date/Time of Note


DATE: 3/14/19 


TIME: 13:55





Assessment/Plan


Lines/Catheters


IV Catheter Type (from Nrs):  Saline Lock


Amaya in Place (from Nrs):  Yes





Assessment/Plan


Chief Complaint/Hosp Course


1.  Transverse colon adenocarcinoma,  Previous small bowel resection with 


primary anastomosis (antecolic over the cancer) and gastrojejunostomy; Ventral 


incisional Swiss cheese type hernia: s/p   Laparoscopic converted to open 


transverse colectomy, Revision of previous scar, Primary repair of ventral 


incisional Swiss cheese type hernia 3/12/19; currently on epidural: No bowel 


function as of yet- possible ileus


-IS


-ambulate as able


-ice pack to abdominal wall


-continue npo and ngt to liws until bowel function


-incision site local care


-follow path


-adrien drain


-wean off epidural and start nonnarcotic analgesics as able


2.  40 pound weight loss secondary to above


3.  Diabetes mellitus type 2


-Nutrition medication optimization


4.  Hypertension


-Nutrition medication optimization


5.  Coronary artery disease with history of Plavix and aspirin


-Cardiac evaluation and optimization


6.  Iron deficiency anemia secondary to above


-Iron transfusion per hematology


-As above





Thank you. Patient seen and examined in collaboration with Dr. Helder Dugan.





Subjective


24 Hr Interval Summary


Severe abdominal pain overnight.  Given epidural bolus.  Epidural ongoing.  + NG


output.  No bowel function as of yet.  ADRIEN drain with large serosanguineous 


drainage.  No fevers, chills, sob, congested cough, cp, palpitations, ha, 


dizziness, nausea, vomiting, diarrhea, dysuria.





Exam/Review of Systems


Vital Signs


Vitals





Vital Signs


  Date      Temp  Pulse  Resp  B/P (MAP)   Pulse Ox  O2          O2 Flow    FiO2


Time                                                 Delivery    Rate


   3/14/19                 20


     09:00


   3/14/19  98.0     78            130/77        97  Room Air


     07:44                           (94)


   3/12/19                                                             2.0


     22:45








Intake and Output





3/13/19


3/13/19


3/14/19





1515:00


23:00


07:00





IntakeIntake Total


850 ml


500 ml


1350 ml





OutputOutput Total


900 ml


300 ml


2210 ml





BalanceBalance


-50 ml


200 ml


-860 ml














Exam


Free Text/Dictation


Constitutional:  alert, oriented; 


   No distress


Psych:  nl mood/affect; 


   No anxiety


Head:  normocephalic, atraumatic


Eyes:  nl conjunctiva, EOMI, PERRL; 


   No icteric


ENMT:  nl external ears & nose, mucosa pink and moist, ngt


Neck:  non-tender; 


   No jvd


Respiratory:  normal air movement; 


   No congested cough, No labored breathing


Cardiovascular:  regular rate and rhythm; 


   No edema


Gastrointestinal:  soft, tender (tanner-incisional: midline staple line: dry, no 


erythema, scant drainage | ADRIEN: serosang)


   No distended, No rebound or guarding


Genitourinary - Male:  nl scrotum, amaya


Musculoskeletal:  nl extremities to inspection, nl gait and stance; 


   No joint tenderness


Extremities:  normal pulses; 


   No calf tenderness, No edema


Neurological:  nl mental status, nl speech, nl strength


Skin:  nl turgor; 


   No rash or lesions


Lymph:  nl lymph nodes





Results


Result Diagram:  


3/13/19 0427                                                                    


           3/13/19 0427














PATRICIA ASHRAF NP       Mar 14, 2019 13:59

## 2019-03-14 NOTE — PN
Date/Time of Note


Date/Time of Note


DATE: 3/14/19 


TIME: 12:36





Assessment/Plan


VTE Prophylaxis


Risk score (from Nsg)>0 risk:  8


SCD applied (from Nsg):  Yes


Pharmacological prophylaxis:  heparin





Lines/Catheters


IV Catheter Type (from Nrsg):  Saline Lock


Urinary Cath still in place:  Yes


Reason Cath still needed:  urinary retention





Assessment/Plan


Hospital Course


59 yo male with DM who presented with hematochezia.  Workup has revealed colon 


adenocarcinoma.  No w s/p open hemicolectomy POD 2





Colon adenocarcinoma:


- Bone scan negative for mets


- s/p colonoscopy with path showing adenocarcinoma


- Dr Mcintyre following


- Hemicolectomy performed 3/12





Iron deficiency anemia:


- IV iron





Diabetes


- Basal/bolus insulin





CKD II:


- stable





Prophylaxis: SCDs





DC planning: Likely to home pending surgery clearance


Result Diagram:  


3/13/19 0427                                                                    


           3/13/19 0427





Results 24hrs





Laboratory Tests


  Test
            3/13/19
13:00  3/13/19
16:54  3/13/19
21:29  3/14/19
00:36


  Bedside Glucose         228  H          189            154            134


  Test
            3/14/19
05:21  3/14/19
08:16  
              



  Bedside Glucose          129            135








Subjective


24 Hr Interval Summary


Free Text/Dictation


Still with epidural infusion, pain controlled at this time


No BM or gas





Exam/Review of Systems


Exam


Vitals





Vital Signs


  Date      Temp  Pulse  Resp  B/P (MAP)   Pulse Ox  O2          O2 Flow    FiO2


Time                                                 Delivery    Rate


   3/14/19  98.0     78    20      130/77        97  Room Air


     07:44                           (94)


   3/12/19                                                             2.0


     22:45








Intake and Output





3/13/19


3/13/19


3/14/19





1515:00


23:00


07:00





IntakeIntake Total


850 ml


500 ml


1350 ml





OutputOutput Total


900 ml


300 ml


2210 ml





BalanceBalance


-50 ml


200 ml


-860 ml











Constitutional:  alert, oriented, well developed


Psych:  no complaints, nl mood/affect


Head:  normocephalic, atraumatic


Eyes:  nl conjunctiva, EOMI, nl lids, nl sclera, PERRL


ENMT:  nl external ears & nose, nl lips & teeth, nl nasal mucosa & septum


Neck:  supple, non-tender


Respiratory:  clear to auscultation, normal air movement


Cardiovascular:  regular rate and rhythm, nl pulses


Gastrointestinal:  soft, nl liver, spleen, non-tender


Musculoskeletal:  nl extremities to inspection, nl gait and stance


Extremities:  normal pulses


Neurological:  CNS II-XII intact, nl mental status, nl speech, nl strength


Skin:  nl turgor; 


   No rash or lesions


Lymph:  nl lymph nodes





Results


Results 24hrs





Laboratory Tests


  Test
            3/13/19
13:00  3/13/19
16:54  3/13/19
21:29  3/14/19
00:36


  Bedside Glucose         228  H          189            154            134


  Test
            3/14/19
05:21  3/14/19
08:16  
              



  Bedside Glucose          129            135








Medications


Medication





Current Medications


IV Flush (NS 3 ml) 3 ml PER PROTOCOL IV ;  Start 3/2/19 at 00:00


Zolpidem Tartrate (Ambien) 10 mg QHS  PRN PO .INSOMNIA Last administered on 


3/12/19at 01:14; Admin Dose 10 MG;  Start 3/2/19 at 00:00


Albuterol/ Ipratropium (Duoneb) 3 ml Q2H RESP THERAPY  PRN HHN SHORTNESS OF 


BREATH;  Start 3/2/19 at 00:00


Miscellaneous Information 1 ea NOTE XX ;  Start 3/2/19 at 00:00


Glucose (Glutose) 15 gm Q15M  PRN PO DECREASED GLUCOSE;  Start 3/2/19 at 00:00


Glucose (Glutose) 22.5 gm Q15M  PRN PO DECREASED GLUCOSE;  Start 3/2/19 at 00:00


Dextrose (D50w Syringe) 25 ml Q15M  PRN IV DECREASED GLUCOSE;  Start 3/2/19 at 


00:00


Dextrose (D50w Syringe) 50 ml Q15M  PRN IV DECREASED GLUCOSE;  Start 3/2/19 at 


00:00


Glucagon (Glucagen) 1 mg Q15M  PRN IM DECREASED GLUCOSE;  Start 3/2/19 at 00:00


Glucose (Glutose) 15 gm Q15M  PRN BUCCAL DECREASED GLUCOSE;  Start 3/2/19 at 


00:00


Insulin Aspart (Novolog Insulin Pen) 8 unit WITH  MEALS SC  Last administered on


3/11/19at 17:58; Admin Dose 8 UNIT;  Start 3/9/19 at 18:00


Insulin Glargine (Lantus) 25 units DAILY@0800 SC  Last administered on 3/13/19at


09:47; Admin Dose 25 UNITS;  Start 3/11/19 at 08:00


Hydromorphone HCl (Dilaudid) 1 mg Q4H  PRN IV SEVERE PAIN LEVEL 7-10 Last 


administered on 3/14/19at 05:09; Admin Dose 1 MG;  Start 3/11/19 at 17:00


Insulin Aspart (Novolog Insulin Pen) NOVOLOG *MODERATE* ALGORITHM Q4 SC  Last 


administered on 3/13/19at 17:02; Admin Dose 4 UNIT;  Start 3/12/19 at 09:00


Sodium Chloride 1,000 ml @  50 mls/hr Q20H IV  Last administered on 3/14/19at 


10:17; Admin Dose 100 MLS/HR;  Start 3/12/19 at 10:00


Morphine Sulfate (morphine) 2 mg Q2H  PRN IV breakthrough pain;  Start 3/12/19 


at 18:30


Acetaminophen/ Hydrocodone Bitart (Norco (5/325)) 1 tab Q6H  PRN PO PAIN LEVEL 


6-10;  Start 3/12/19 at 18:30


Acetaminophen (Tylenol Tab) 650 mg Q6H  PRN PO MILD PAIN(1-3)OR ELEVATED TEMP;  


Start 3/12/19 at 18:30


Ibuprofen (Motrin) 600 mg Q6H  PRN PO PAIN LEVEL 1-5;  Start 3/12/19 at 18:30


Ondansetron HCl (Zofran Inj) 4 mg Q6H  PRN IV NAUSEA AND/OR VOMITING Last 


administered on 3/14/19at 12:08; Admin Dose 4 MG;  Start 3/12/19 at 18:30


Fentanyl/ Ropivacaine 100 ml CONT EPIDURAL EPI  Last administered on 3/14/19at 


10:53; Admin Dose 100 ML;  Start 3/12/19 at 19:00


Celecoxib (Celebrex) 200 mg DAILY PO ;  Start 3/13/19 at 09:00;  Stop 3/15/19 at


09:01


Miscellaneous Information (* Miscellaneous Pharmacy Order) DURAMORPH: 5 MG 


EPIDU... GIVEN NEURAXIAL XX ;  Start 3/12/19 at 21:00


Famotidine (Pepcid Iv) 20 mg DAILY IV  Last administered on 3/14/19at 08:18; 


Admin Dose 20 MG;  Start 3/13/19 at 09:00


Phenol (Chloraseptic Throat Spray) 2 spray Q2H  PRN MT SORE THROAT Last 


administered on 3/13/19at 19:36; Admin Dose 2 SPRAY;  Start 3/13/19 at 17:30


Hydromorphone HCl (Dilaudid) 0.4 mg Q10MIN  PRN IV SEVERE PAIN LEVEL 7-10 Last 


administered on 3/14/19at 08:45; Admin Dose 0.4 MG;  Start 3/14/19 at 08:30


Naloxone HCl (Narcan) 0.2 mg Q2M  PRN IV .RESP RATE;  Start 3/14/19 at 10:30











HANNAH DOWNING MD          Mar 14, 2019 12:37

## 2019-03-14 NOTE — OPPN
Date/Time of Note


Date/Time of Note


DATE: 3/14/19 


TIME: 09:59





Anesthesia Follow up


Anesthesia Follow up


Last documented vital signs





Vital Signs


  Date      Temp  Pulse  Resp  B/P (MAP)   Pulse Ox  O2          O2 Flow    FiO2


Time                                                 Delivery    Rate


   3/14/19  98.0     78    20      130/77        97  Room Air


     07:44                           (94)


   3/12/19                                                             2.0


     22:45





Respiratory function:  WNL


Cardiovascular function:  WNL


Comments


POD#2


Patient is a 58 yr old with Hs of recently Dx AdenoCA of Transverse Colon, went 


under GETA yesterday for Laparoscopic to Open Transverse Colectomy and Lysis of 


Adhesion.


Low Thoracic Epidural Catheter placed for post op pain management as well as 


Bilateral TAP block was done at the end of the surgery.


Patient has a Hx of DM, HTN but otherwise stable. He moves all extremity and no 


weakness reported, His vital signs have been stable, he is afebrile. He started 


having Pain from middle of the night, he was given 12 CC of Epidural Bolus and 


the Epidural infusion was increased to 10cc/hr.


Will continue the Epidural infusion and will follow up.


Epidural Catheter is intact and dressing is clean.











KENNY CABELLO MD              Mar 14, 2019 10:02

## 2019-03-14 NOTE — CONS
Assessment/Plan


Assessment/Plan


Hospital Course (Demo Recall)


IMPRESSION:


1.  Preoperative evaluation in a patient prior to colectomy that has multiple 


cardiac risk factors and possible prior stent apparently placed approximately 2 


to 3 years prior.-neg trop x 2. Now POD#1 s/p surgery


2.  Hypotension, borderline.-ongoing today


3.  History of possible prior stent placements 2 to 3 years prior per chart 


biopsy.


4.  Dyslipidemia.


5.  Adenocarcinoma of the colon, newly diagnosed.


6.  Abdominal pain secondary to adenocarcinoma of the colon.


7.  Diabetes mellitus.


8.  Anemia.


9.  Renal failure insufficiency.





Recc:


-Follow BP/volume status clsoely


-routine post-op care


-pain control


-Follow BS


-Follow NGT output





Consultation Date/Type/Reason


Admit Date/Time


Mar 1, 2019 at 20:38


Initial Consult Date


3/10/19


Type of Consult


Cardiology


Reason for Consultation


preop


Requesting Provider:  HANNAH DOWNING MD


Date/Time of Note


DATE: 3/14/19 


TIME: 13:09





Exam/Review of Systems


Vital Signs


Vitals





Vital Signs


  Date      Temp  Pulse  Resp  B/P (MAP)   Pulse Ox  O2          O2 Flow    FiO2


Time                                                 Delivery    Rate


   3/14/19                 20


     09:00


   3/14/19  98.0     78            130/77        97  Room Air


     07:44                           (94)


   3/12/19                                                             2.0


     22:45








Intake and Output





3/13/19


3/13/19


3/14/19





1515:00


23:00


07:00





IntakeIntake Total


850 ml


500 ml


1350 ml





OutputOutput Total


900 ml


300 ml


2210 ml





BalanceBalance


-50 ml


200 ml


-860 ml














Exam


Exam


Review of Systems:


CONSTITUTIONAL:  No fevers, chills.


PULMONARY:  No sob


CARDIOVASCULAR: No chest pain/palpitations


GASTROINTESTINAL:  mild abd pain


GENITOURINARY:  No hematuria/dysuria.


MUSCULOSKELETAL:  No myagias/arthalgias.


PSYCHIATRIC:  The patient denies depression.


NEUROLOGIC:   No weakness


Constitutional:  alert


Psych:  no complaints


Head:  normocephalic


ENMT:  other (NG tube in place)


Neck:  supple, jvd (8 cm water)


Respiratory:  clear to auscultation


Cardiovascular:  regular rate and rhythm


Gastrointestinal:  soft, other (covered by dressing)


Musculoskeletal:  muscle tone (normal)


Extremities:  edema (none)


Neurological:  other (No focal deficits)





Labs


Result Diagram:  


3/13/19 0427                                                                    


           3/13/19 0427





Results 24hrs





Laboratory Tests


  Test
            3/13/19
16:54  3/13/19
21:29  3/14/19
00:36  3/14/19
05:21


  Bedside Glucose          189            154            134            129


  Test
            3/14/19
08:16  3/14/19
12:29  
              



  Bedside Glucose          135            140








Medications


Medications





Current Medications


IV Flush (NS 3 ml) 3 ml PER PROTOCOL IV ;  Start 3/2/19 at 00:00


Zolpidem Tartrate (Ambien) 10 mg QHS  PRN PO .INSOMNIA Last administered on 


3/12/19at 01:14; Admin Dose 10 MG;  Start 3/2/19 at 00:00


Albuterol/ Ipratropium (Duoneb) 3 ml Q2H RESP THERAPY  PRN HHN SHORTNESS OF 


BREATH;  Start 3/2/19 at 00:00


Miscellaneous Information 1 ea NOTE XX ;  Start 3/2/19 at 00:00


Glucose (Glutose) 15 gm Q15M  PRN PO DECREASED GLUCOSE;  Start 3/2/19 at 00:00


Glucose (Glutose) 22.5 gm Q15M  PRN PO DECREASED GLUCOSE;  Start 3/2/19 at 00:00


Dextrose (D50w Syringe) 25 ml Q15M  PRN IV DECREASED GLUCOSE;  Start 3/2/19 at 


00:00


Dextrose (D50w Syringe) 50 ml Q15M  PRN IV DECREASED GLUCOSE;  Start 3/2/19 at 


00:00


Glucagon (Glucagen) 1 mg Q15M  PRN IM DECREASED GLUCOSE;  Start 3/2/19 at 00:00


Glucose (Glutose) 15 gm Q15M  PRN BUCCAL DECREASED GLUCOSE;  Start 3/2/19 at 


00:00


Insulin Aspart (Novolog Insulin Pen) 8 unit WITH  MEALS SC  Last administered on


3/11/19at 17:58; Admin Dose 8 UNIT;  Start 3/9/19 at 18:00


Insulin Glargine (Lantus) 25 units DAILY@0800 SC  Last administered on 3/14/19at


12:35; Admin Dose 25 UNITS;  Start 3/11/19 at 08:00


Hydromorphone HCl (Dilaudid) 1 mg Q4H  PRN IV SEVERE PAIN LEVEL 7-10 Last 


administered on 3/14/19at 05:09; Admin Dose 1 MG;  Start 3/11/19 at 17:00


Insulin Aspart (Novolog Insulin Pen) NOVOLOG *MODERATE* ALGORITHM Q4 SC  Last 


administered on 3/13/19at 17:02; Admin Dose 4 UNIT;  Start 3/12/19 at 09:00


Sodium Chloride 1,000 ml @  50 mls/hr Q20H IV  Last administered on 3/14/19at 


10:17; Admin Dose 100 MLS/HR;  Start 3/12/19 at 10:00


Morphine Sulfate (morphine) 2 mg Q2H  PRN IV breakthrough pain;  Start 3/12/19 


at 18:30


Acetaminophen/ Hydrocodone Bitart (Norco (5/325)) 1 tab Q6H  PRN PO PAIN LEVEL 


6-10;  Start 3/12/19 at 18:30


Acetaminophen (Tylenol Tab) 650 mg Q6H  PRN PO MILD PAIN(1-3)OR ELEVATED TEMP;  


Start 3/12/19 at 18:30


Ibuprofen (Motrin) 600 mg Q6H  PRN PO PAIN LEVEL 1-5;  Start 3/12/19 at 18:30


Ondansetron HCl (Zofran Inj) 4 mg Q6H  PRN IV NAUSEA AND/OR VOMITING Last 


administered on 3/14/19at 12:08; Admin Dose 4 MG;  Start 3/12/19 at 18:30


Fentanyl/ Ropivacaine 100 ml CONT EPIDURAL EPI  Last administered on 3/14/19at 


10:53; Admin Dose 100 ML;  Start 3/12/19 at 19:00


Celecoxib (Celebrex) 200 mg DAILY PO ;  Start 3/13/19 at 09:00;  Stop 3/15/19 at


09:01


Miscellaneous Information (* Miscellaneous Pharmacy Order) DURAMORPH: 5 MG 


EPIDU... GIVEN NEURAXIAL XX ;  Start 3/12/19 at 21:00


Phenol (Chloraseptic Throat Spray) 2 spray Q2H  PRN MT SORE THROAT Last 


administered on 3/13/19at 19:36; Admin Dose 2 SPRAY;  Start 3/13/19 at 17:30


Hydromorphone HCl (Dilaudid) 0.4 mg Q10MIN  PRN IV SEVERE PAIN LEVEL 7-10 Last 


administered on 3/14/19at 08:45; Admin Dose 0.4 MG;  Start 3/14/19 at 08:30


Naloxone HCl (Narcan) 0.2 mg Q2M  PRN IV .RESP RATE;  Start 3/14/19 at 10:30











SHERINE GOLDSMITH               Mar 14, 2019 13:10 no

## 2019-03-15 VITALS — HEART RATE: 80 BPM | SYSTOLIC BLOOD PRESSURE: 109 MMHG | DIASTOLIC BLOOD PRESSURE: 81 MMHG | RESPIRATION RATE: 20 BRPM

## 2019-03-15 VITALS — DIASTOLIC BLOOD PRESSURE: 75 MMHG | SYSTOLIC BLOOD PRESSURE: 116 MMHG | HEART RATE: 96 BPM | RESPIRATION RATE: 18 BRPM

## 2019-03-15 VITALS — SYSTOLIC BLOOD PRESSURE: 113 MMHG | HEART RATE: 98 BPM | RESPIRATION RATE: 18 BRPM | DIASTOLIC BLOOD PRESSURE: 62 MMHG

## 2019-03-15 VITALS — DIASTOLIC BLOOD PRESSURE: 72 MMHG | RESPIRATION RATE: 17 BRPM | SYSTOLIC BLOOD PRESSURE: 118 MMHG | HEART RATE: 72 BPM

## 2019-03-15 LAB
ADD MAN DIFF?: NO
ANION GAP: 9 (ref 5–13)
BASOPHIL #: 0 10^3/UL (ref 0–0.1)
BASOPHILS %: 0.2 % (ref 0–2)
BLOOD UREA NITROGEN: 11 MG/DL (ref 7–20)
CALCIUM: 8.7 MG/DL (ref 8.4–10.2)
CARBON DIOXIDE: 23 MMOL/L (ref 21–31)
CHLORIDE: 106 MMOL/L (ref 97–110)
CREATININE: 1.07 MG/DL (ref 0.61–1.24)
EOSINOPHILS #: 0.1 10^3/UL (ref 0–0.5)
EOSINOPHILS %: 1 % (ref 0–7)
GLUCOSE: 95 MG/DL (ref 70–220)
HEMATOCRIT: 29.7 % (ref 42–52)
HEMOGLOBIN: 9.3 G/DL (ref 14–18)
IMMATURE GRANS #M: 0.06 10^3/UL (ref 0–0.03)
IMMATURE GRANS % (M): 0.7 % (ref 0–0.43)
LYMPHOCYTES #: 2 10^3/UL (ref 0.8–2.9)
LYMPHOCYTES %: 22.8 % (ref 15–51)
MEAN CORPUSCULAR HEMOGLOBIN: 25.2 PG (ref 29–33)
MEAN CORPUSCULAR HGB CONC: 31.3 G/DL (ref 32–37)
MEAN CORPUSCULAR VOLUME: 80.5 FL (ref 82–101)
MEAN PLATELET VOLUME: 10.1 FL (ref 7.4–10.4)
MONOCYTE #: 0.4 10^3/UL (ref 0.3–0.9)
MONOCYTES %: 5 % (ref 0–11)
NEUTROPHIL #: 6.2 10^3/UL (ref 1.6–7.5)
NEUTROPHILS %: 70.3 % (ref 39–77)
NUCLEATED RED BLOOD CELLS #: 0 10^3/UL (ref 0–0)
NUCLEATED RED BLOOD CELLS%: 0 /100WBC (ref 0–0)
PLATELET COUNT: 291 10^3/UL (ref 140–415)
POTASSIUM: 4 MMOL/L (ref 3.5–5.1)
RED BLOOD COUNT: 3.69 10^6/UL (ref 4.7–6.1)
RED CELL DISTRIBUTION WIDTH: 16.8 % (ref 11.5–14.5)
SODIUM: 138 MMOL/L (ref 135–144)
WHITE BLOOD COUNT: 8.8 10^3/UL (ref 4.8–10.8)

## 2019-03-15 RX ADMIN — INSULIN ASPART 1 UNIT: 100 INJECTION, SOLUTION INTRAVENOUS; SUBCUTANEOUS at 07:50

## 2019-03-15 RX ADMIN — FENTANYL CIT 0.2 MG/100ML-ROPIV 0.2%-NACL 0.9% EPIDURAL INJ 1 ML: 2/0.2 SOLUTION at 07:48

## 2019-03-15 RX ADMIN — CELECOXIB 1 MG: 200 CAPSULE ORAL at 07:57

## 2019-03-15 RX ADMIN — INSULIN ASPART 1 UNIT: 100 INJECTION, SOLUTION INTRAVENOUS; SUBCUTANEOUS at 05:00

## 2019-03-15 RX ADMIN — FENTANYL CIT 0.2 MG/100ML-ROPIV 0.2%-NACL 0.9% EPIDURAL INJ SCH ML: 2/0.2 SOLUTION at 07:48

## 2019-03-15 RX ADMIN — THIAMINE HYDROCHLORIDE 1 MLS/HR: 100 INJECTION, SOLUTION INTRAMUSCULAR; INTRAVENOUS at 21:26

## 2019-03-15 RX ADMIN — INSULIN ASPART 1 UNIT: 100 INJECTION, SOLUTION INTRAVENOUS; SUBCUTANEOUS at 17:55

## 2019-03-15 RX ADMIN — DEXAMETHASONE SODIUM PHOSPHATE PRN MG: 10 INJECTION, SOLUTION INTRAMUSCULAR; INTRAVENOUS at 23:42

## 2019-03-15 RX ADMIN — FENTANYL CIT 0.2 MG/100ML-ROPIV 0.2%-NACL 0.9% EPIDURAL INJ SCH ML: 2/0.2 SOLUTION at 18:57

## 2019-03-15 RX ADMIN — CELECOXIB SCH MG: 200 CAPSULE ORAL at 07:57

## 2019-03-15 RX ADMIN — THIAMINE HYDROCHLORIDE 1 MLS/HR: 100 INJECTION, SOLUTION INTRAMUSCULAR; INTRAVENOUS at 03:11

## 2019-03-15 RX ADMIN — PHENOL PRN SPRAY: 1.5 LIQUID ORAL at 23:50

## 2019-03-15 RX ADMIN — HYDROMORPHONE HYDROCHLORIDE 1 MG: 1 INJECTION, SOLUTION INTRAMUSCULAR; INTRAVENOUS; SUBCUTANEOUS at 00:06

## 2019-03-15 RX ADMIN — FENTANYL CIT 0.2 MG/100ML-ROPIV 0.2%-NACL 0.9% EPIDURAL INJ 1 ML: 2/0.2 SOLUTION at 18:57

## 2019-03-15 RX ADMIN — INSULIN GLARGINE 1 UNITS: 100 INJECTION, SOLUTION SUBCUTANEOUS at 09:42

## 2019-03-15 RX ADMIN — INSULIN ASPART 1 UNIT: 100 INJECTION, SOLUTION INTRAVENOUS; SUBCUTANEOUS at 17:00

## 2019-03-15 RX ADMIN — ZOLPIDEM TARTRATE 1 MG: 5 TABLET, FILM COATED ORAL at 21:25

## 2019-03-15 RX ADMIN — INSULIN ASPART 1 UNIT: 100 INJECTION, SOLUTION INTRAVENOUS; SUBCUTANEOUS at 09:00

## 2019-03-15 RX ADMIN — FOLIC ACID SCH MLS/HR: 5 INJECTION, SOLUTION INTRAMUSCULAR; INTRAVENOUS; SUBCUTANEOUS at 21:26

## 2019-03-15 RX ADMIN — ONDANSETRON HYDROCHLORIDE 1 MG: 2 INJECTION, SOLUTION INTRAMUSCULAR; INTRAVENOUS at 23:50

## 2019-03-15 RX ADMIN — HYDROMORPHONE HYDROCHLORIDE PRN MG: 1 INJECTION, SOLUTION INTRAMUSCULAR; INTRAVENOUS; SUBCUTANEOUS at 00:06

## 2019-03-15 RX ADMIN — INSULIN ASPART 1 UNIT: 100 INJECTION, SOLUTION INTRAVENOUS; SUBCUTANEOUS at 13:00

## 2019-03-15 RX ADMIN — ZOLPIDEM TARTRATE PRN MG: 5 TABLET, FILM COATED ORAL at 21:25

## 2019-03-15 RX ADMIN — DEXAMETHASONE SODIUM PHOSPHATE PRN MG: 10 INJECTION, SOLUTION INTRAMUSCULAR; INTRAVENOUS at 23:50

## 2019-03-15 RX ADMIN — ONDANSETRON HYDROCHLORIDE 1 MG: 2 INJECTION, SOLUTION INTRAMUSCULAR; INTRAVENOUS at 23:42

## 2019-03-15 RX ADMIN — PHENOL 1 SPRAY: 1.4 SPRAY ORAL at 23:50

## 2019-03-15 RX ADMIN — INSULIN GLARGINE SCH UNITS: 100 INJECTION, SOLUTION SUBCUTANEOUS at 09:42

## 2019-03-15 RX ADMIN — INSULIN ASPART 1 UNIT: 100 INJECTION, SOLUTION INTRAVENOUS; SUBCUTANEOUS at 01:00

## 2019-03-15 RX ADMIN — INSULIN ASPART 1 UNIT: 100 INJECTION, SOLUTION INTRAVENOUS; SUBCUTANEOUS at 11:40

## 2019-03-15 RX ADMIN — INSULIN ASPART 1 UNIT: 100 INJECTION, SOLUTION INTRAVENOUS; SUBCUTANEOUS at 21:00

## 2019-03-15 RX ADMIN — FOLIC ACID SCH MLS/HR: 5 INJECTION, SOLUTION INTRAMUSCULAR; INTRAVENOUS; SUBCUTANEOUS at 03:11

## 2019-03-15 NOTE — CONS
Assessment/Plan


Assessment/Plan


Hospital Course (Demo Recall)


57 yo with newly diagnosed colon adenoca


-CT chest shows 4 mm noncalcified nodule in the right apex, which is 


nonspecific. 


-CT AP: Irregular bowel wall thickening / mass with adjacent stranding of the 


distal transverse colon, compatible with given history of colon cancer. Shotty 


periaortic lymph nodes. The largest is an 11 mm node with normal fatty hilum, 


suggestive of reactive etiology.  Small nonspecific peripherally sclerotic 


lesion in the left iliac bone measuring 6 mm.


-bone scan negative for mets


-he is s/p surgery: per notes this revealed previous small bowel resection with 


primary anastomosis (antecolic over the cancer) and gastrojejunostomy; Ventral 


incisional Swiss cheese type hernia: s/p   Laparoscopic converted to open 


transverse colectomy, Revision of previous scar, Primary repair of ventral 


incisional Swiss cheese type hernia 3/12/19


-once we have pathology and stage, can determine if pt will need adjuvant chemo


will follow periodically until path is back





Consultation Date/Type/Reason


Admit Date/Time


Mar 1, 2019 at 20:38


Initial Consult Date


3/6/19


Requesting Provider:  HANNAH DOWNING MD


Date/Time of Note


DATE: 3/15/19 


TIME: 13:50





24 HR Interval Summary


Free Text/Dictation


recovering from surgery





Exam/Review of Systems


Exam


Vitals





Vital Signs


  Date      Temp  Pulse  Resp  B/P (MAP)   Pulse Ox  O2          O2 Flow    FiO2


Time                                                 Delivery    Rate


   3/15/19                 20


     13:00


   3/15/19  98.2     98            113/62        93  Room Air


     08:13                           (79)


   3/12/19                                                             2.0


     22:45








Intake and Output





3/14/19


3/14/19


3/15/19





1515:00


23:00


07:00





IntakeIntake Total


550 ml


410 ml


690 ml





OutputOutput Total


50 ml


1630 ml


1700 ml





BalanceBalance


500 ml


-1220 ml


-1010 ml














Results


Result Diagram:  


3/15/19 0424                                                                    


           3/15/19 0425





Results 24hrs





Laboratory Tests


Test
                 3/14/19
17:24  3/14/19
20:36  3/15/19
01:32  3/15/19
04:24


Bedside Glucose               141            108            100


White Blood Count                                                          8.8


Red Blood Count                                                          3.69  L


Hemoglobin                                                                9.3  L


Hematocrit                                                               29.7  L


Mean Corpuscular                                                         80.5  L


Volume


Mean Corpuscular                                                         25.2  L


Hemoglobin


Mean Corpuscular      
              
              
                   31.3  L



Hemoglobin
Concent


Red Cell                                                                 16.8  H


Distribution Width


Platelet Count                                                             291


Mean Platelet Volume                                                      10.1


Immature                                                                0.700  H


Granulocytes %


Neutrophils %                                                             70.3


Lymphocytes %                                                             22.8


Monocytes %                                                                5.0


Eosinophils %                                                              1.0


Basophils %                                                                0.2


Nucleated Red Blood                                                        0.0


Cells %


Immature                                                                0.060  H


Granulocytes #


Neutrophils #                                                              6.2


Lymphocytes #                                                              2.0


Monocytes #                                                                0.4


Eosinophils #                                                              0.1


Basophils #                                                                0.0


Nucleated Red Blood                                                        0.0


Cells #


Test
                 3/15/19
04:25  3/15/19
04:52  3/15/19
08:43  3/15/19
10:11


Sodium Level                  138


Potassium Level               4.0


Chloride Level                106


Carbon Dioxide Level           23


Anion Gap                       9


Blood Urea Nitrogen            11


Creatinine                   1.07


Est Glomerular        > 60  
        
              
              



Filtrat Rate
mL/min


Glucose Level                 95  #


Calcium Level                 8.7


Bedside Glucose                               92            108            111


Test
                 3/15/19
12:28  
              
              



Bedside Glucose               113








Medications


Medication





Current Medications


IV Flush (NS 3 ml) 3 ml PER PROTOCOL IV ;  Start 3/2/19 at 00:00


Zolpidem Tartrate (Ambien) 10 mg QHS  PRN PO .INSOMNIA Last administered on 


3/12/19at 01:14; Admin Dose 10 MG;  Start 3/2/19 at 00:00


Albuterol/ Ipratropium (Duoneb) 3 ml Q2H RESP THERAPY  PRN HHN SHORTNESS OF 


BREATH;  Start 3/2/19 at 00:00


Miscellaneous Information 1 ea NOTE XX ;  Start 3/2/19 at 00:00


Glucose (Glutose) 15 gm Q15M  PRN PO DECREASED GLUCOSE;  Start 3/2/19 at 00:00


Glucose (Glutose) 22.5 gm Q15M  PRN PO DECREASED GLUCOSE;  Start 3/2/19 at 00:00


Dextrose (D50w Syringe) 25 ml Q15M  PRN IV DECREASED GLUCOSE;  Start 3/2/19 at 


00:00


Dextrose (D50w Syringe) 50 ml Q15M  PRN IV DECREASED GLUCOSE;  Start 3/2/19 at 


00:00


Glucagon (Glucagen) 1 mg Q15M  PRN IM DECREASED GLUCOSE;  Start 3/2/19 at 00:00


Glucose (Glutose) 15 gm Q15M  PRN BUCCAL DECREASED GLUCOSE;  Start 3/2/19 at 


00:00


Insulin Aspart (Novolog Insulin Pen) 8 unit WITH  MEALS SC  Last administered on


3/11/19at 17:58; Admin Dose 8 UNIT;  Start 3/9/19 at 18:00


Insulin Glargine (Lantus) 25 units DAILY@0800 SC  Last administered on 3/15/19at


09:42; Admin Dose 25 UNITS;  Start 3/11/19 at 08:00


Hydromorphone HCl (Dilaudid) 1 mg Q4H  PRN IV SEVERE PAIN LEVEL 7-10 Last 


administered on 3/14/19at 05:09; Admin Dose 1 MG;  Start 3/11/19 at 17:00


Insulin Aspart (Novolog Insulin Pen) NOVOLOG *MODERATE* ALGORITHM Q4 SC  Last 


administered on 3/13/19at 17:02; Admin Dose 4 UNIT;  Start 3/12/19 at 09:00


Sodium Chloride 1,000 ml @  50 mls/hr Q20H IV  Last administered on 3/15/19at 


03:11; Admin Dose 50 MLS/HR;  Start 3/12/19 at 10:00


Morphine Sulfate (morphine) 2 mg Q2H  PRN IV breakthrough pain;  Start 3/12/19 


at 18:30


Acetaminophen/ Hydrocodone Bitart (Norco (5/325)) 1 tab Q6H  PRN PO PAIN LEVEL 


6-10;  Start 3/12/19 at 18:30


Acetaminophen (Tylenol Tab) 650 mg Q6H  PRN PO MILD PAIN(1-3)OR ELEVATED TEMP;  


Start 3/12/19 at 18:30


Ibuprofen (Motrin) 600 mg Q6H  PRN PO PAIN LEVEL 1-5;  Start 3/12/19 at 18:30


Ondansetron HCl (Zofran Inj) 4 mg Q6H  PRN IV NAUSEA AND/OR VOMITING Last 


administered on 3/14/19at 12:08; Admin Dose 4 MG;  Start 3/12/19 at 18:30


Miscellaneous Information (* Miscellaneous Pharmacy Order) DURAMORPH: 5 MG 


EPIDU... GIVEN NEURAXIAL XX ;  Start 3/12/19 at 21:00


Phenol (Chloraseptic Throat Spray) 2 spray Q2H  PRN MT SORE THROAT Last 


administered on 3/13/19at 19:36; Admin Dose 2 SPRAY;  Start 3/13/19 at 17:30


Hydromorphone HCl (Dilaudid) 0.4 mg Q10MIN  PRN IV SEVERE PAIN LEVEL 7-10 Last 


administered on 3/15/19at 00:06; Admin Dose 0.4 MG;  Start 3/14/19 at 08:30


Naloxone HCl (Narcan) 0.2 mg Q2M  PRN IV .RESP RATE;  Start 3/14/19 at 10:30


Fentanyl/ Ropivacaine 100 ml CONT EPIDURAL EPI ;  Start 3/15/19 at 10:15











LUÍS LOZANO                Mar 15, 2019 13:52

## 2019-03-15 NOTE — PN
Date/Time of Note


Date/Time of Note


DATE: 3/15/19 


TIME: 12:29





Assessment/Plan


VTE Prophylaxis


Risk score (from Nsg)>0 risk:  2


SCD applied (from Ns):  Yes


Pharmacological prophylaxis:  heparin





Lines/Catheters


IV Catheter Type (from Nrsg):  Saline Lock


Urinary Cath still in place:  Yes


Reason Cath still needed:  urinary retention





Assessment/Plan


Hospital Course


59 yo male with DM who presented with hematochezia.  Workup has revealed colon 


adenocarcinoma.  No w s/p open hemicolectomy POD 2





Colon adenocarcinoma:


- Bone scan negative for mets


- s/p colonoscopy with path showing adenocarcinoma


- Dr Mcintyre following





s/p colectomy


- Hemicolectomy performed 3/12.  Periop management per surgery


- Await return of bowel function


- epidural/pain managmeent per anesthesia


- IV fluids while NPO





Iron deficiency anemia:


- IV iron





Diabetes


- Basal/bolus insulin





CKD II:


- stable





Prophylaxis: SCDs





DC planning: Likely to home pending surgery clearance


Result Diagram:  


3/15/19 0424                                                                    


           3/15/19 0425





Results 24hrs





Laboratory Tests


Test
                 3/14/19
17:24  3/14/19
20:36  3/15/19
01:32  3/15/19
04:24


Bedside Glucose               141            108            100


White Blood Count                                                          8.8


Red Blood Count                                                          3.69  L


Hemoglobin                                                                9.3  L


Hematocrit                                                               29.7  L


Mean Corpuscular                                                         80.5  L


Volume


Mean Corpuscular                                                         25.2  L


Hemoglobin


Mean Corpuscular      
              
              
                   31.3  L



Hemoglobin
Concent


Red Cell                                                                 16.8  H


Distribution Width


Platelet Count                                                             291


Mean Platelet Volume                                                      10.1


Immature                                                                0.700  H


Granulocytes %


Neutrophils %                                                             70.3


Lymphocytes %                                                             22.8


Monocytes %                                                                5.0


Eosinophils %                                                              1.0


Basophils %                                                                0.2


Nucleated Red Blood                                                        0.0


Cells %


Immature                                                                0.060  H


Granulocytes #


Neutrophils #                                                              6.2


Lymphocytes #                                                              2.0


Monocytes #                                                                0.4


Eosinophils #                                                              0.1


Basophils #                                                                0.0


Nucleated Red Blood                                                        0.0


Cells #


Test
                 3/15/19
04:25  3/15/19
04:52  3/15/19
08:43  3/15/19
10:11


Sodium Level                  138


Potassium Level               4.0


Chloride Level                106


Carbon Dioxide Level           23


Anion Gap                       9


Blood Urea Nitrogen            11


Creatinine                   1.07


Est Glomerular        > 60  
        
              
              



Filtrat Rate
mL/min


Glucose Level                 95  #


Calcium Level                 8.7


Bedside Glucose                               92            108            111








Subjective


24 Hr Interval Summary


Free Text/Dictation


No pain


No flatus or BM yet


Still with NG and epidrual





Exam/Review of Systems


Exam


Vitals





Vital Signs


  Date      Temp  Pulse  Resp  B/P (MAP)   Pulse Ox  O2          O2 Flow    FiO2


Time                                                 Delivery    Rate


   3/15/19                 20


     09:00


   3/15/19  98.2     98            113/62        93  Room Air


     08:13                           (79)


   3/12/19                                                             2.0


     22:45








Intake and Output





3/14/19


3/14/19


3/15/19





1515:00


23:00


07:00





IntakeIntake Total


550 ml


410 ml


690 ml





OutputOutput Total


50 ml


1630 ml


1700 ml





BalanceBalance


500 ml


-1220 ml


-1010 ml











Exam


Well appearing no distress


AOx3


NG tube


RRR


Breathign comfortably


CHA drain scant output


Midline incision c/d/i





Results


Results 24hrs





Laboratory Tests


Test
                 3/14/19
17:24  3/14/19
20:36  3/15/19
01:32  3/15/19
04:24


Bedside Glucose               141            108            100


White Blood Count                                                          8.8


Red Blood Count                                                          3.69  L


Hemoglobin                                                                9.3  L


Hematocrit                                                               29.7  L


Mean Corpuscular                                                         80.5  L


Volume


Mean Corpuscular                                                         25.2  L


Hemoglobin


Mean Corpuscular      
              
              
                   31.3  L



Hemoglobin
Concent


Red Cell                                                                 16.8  H


Distribution Width


Platelet Count                                                             291


Mean Platelet Volume                                                      10.1


Immature                                                                0.700  H


Granulocytes %


Neutrophils %                                                             70.3


Lymphocytes %                                                             22.8


Monocytes %                                                                5.0


Eosinophils %                                                              1.0


Basophils %                                                                0.2


Nucleated Red Blood                                                        0.0


Cells %


Immature                                                                0.060  H


Granulocytes #


Neutrophils #                                                              6.2


Lymphocytes #                                                              2.0


Monocytes #                                                                0.4


Eosinophils #                                                              0.1


Basophils #                                                                0.0


Nucleated Red Blood                                                        0.0


Cells #


Test
                 3/15/19
04:25  3/15/19
04:52  3/15/19
08:43  3/15/19
10:11


Sodium Level                  138


Potassium Level               4.0


Chloride Level                106


Carbon Dioxide Level           23


Anion Gap                       9


Blood Urea Nitrogen            11


Creatinine                   1.07


Est Glomerular        > 60  
        
              
              



Filtrat Rate
mL/min


Glucose Level                 95  #


Calcium Level                 8.7


Bedside Glucose                               92            108            111








Medications


Medication





Current Medications


IV Flush (NS 3 ml) 3 ml PER PROTOCOL IV ;  Start 3/2/19 at 00:00


Zolpidem Tartrate (Ambien) 10 mg QHS  PRN PO .INSOMNIA Last administered on 


3/12/19at 01:14; Admin Dose 10 MG;  Start 3/2/19 at 00:00


Albuterol/ Ipratropium (Duoneb) 3 ml Q2H RESP THERAPY  PRN HHN SHORTNESS OF 


BREATH;  Start 3/2/19 at 00:00


Miscellaneous Information 1 ea NOTE XX ;  Start 3/2/19 at 00:00


Glucose (Glutose) 15 gm Q15M  PRN PO DECREASED GLUCOSE;  Start 3/2/19 at 00:00


Glucose (Glutose) 22.5 gm Q15M  PRN PO DECREASED GLUCOSE;  Start 3/2/19 at 00:00


Dextrose (D50w Syringe) 25 ml Q15M  PRN IV DECREASED GLUCOSE;  Start 3/2/19 at 


00:00


Dextrose (D50w Syringe) 50 ml Q15M  PRN IV DECREASED GLUCOSE;  Start 3/2/19 at 


00:00


Glucagon (Glucagen) 1 mg Q15M  PRN IM DECREASED GLUCOSE;  Start 3/2/19 at 00:00


Glucose (Glutose) 15 gm Q15M  PRN BUCCAL DECREASED GLUCOSE;  Start 3/2/19 at 


00:00


Insulin Aspart (Novolog Insulin Pen) 8 unit WITH  MEALS SC  Last administered on


3/11/19at 17:58; Admin Dose 8 UNIT;  Start 3/9/19 at 18:00


Insulin Glargine (Lantus) 25 units DAILY@0800 SC  Last administered on 3/15/19at


09:42; Admin Dose 25 UNITS;  Start 3/11/19 at 08:00


Hydromorphone HCl (Dilaudid) 1 mg Q4H  PRN IV SEVERE PAIN LEVEL 7-10 Last 


administered on 3/14/19at 05:09; Admin Dose 1 MG;  Start 3/11/19 at 17:00


Insulin Aspart (Novolog Insulin Pen) NOVOLOG *MODERATE* ALGORITHM Q4 SC  Last 


administered on 3/13/19at 17:02; Admin Dose 4 UNIT;  Start 3/12/19 at 09:00


Sodium Chloride 1,000 ml @  50 mls/hr Q20H IV  Last administered on 3/15/19at 


03:11; Admin Dose 50 MLS/HR;  Start 3/12/19 at 10:00


Morphine Sulfate (morphine) 2 mg Q2H  PRN IV breakthrough pain;  Start 3/12/19 


at 18:30


Acetaminophen/ Hydrocodone Bitart (Norco (5/325)) 1 tab Q6H  PRN PO PAIN LEVEL 


6-10;  Start 3/12/19 at 18:30


Acetaminophen (Tylenol Tab) 650 mg Q6H  PRN PO MILD PAIN(1-3)OR ELEVATED TEMP;  


Start 3/12/19 at 18:30


Ibuprofen (Motrin) 600 mg Q6H  PRN PO PAIN LEVEL 1-5;  Start 3/12/19 at 18:30


Ondansetron HCl (Zofran Inj) 4 mg Q6H  PRN IV NAUSEA AND/OR VOMITING Last 


administered on 3/14/19at 12:08; Admin Dose 4 MG;  Start 3/12/19 at 18:30


Fentanyl/ Ropivacaine 100 ml CONT EPIDURAL EPI  Last administered on 3/15/19at 


07:48; Admin Dose 100 ML;  Start 3/12/19 at 19:00


Miscellaneous Information (* Miscellaneous Pharmacy Order) DURAMORPH: 5 MG 


EPIDU... GIVEN NEURAXIAL XX ;  Start 3/12/19 at 21:00


Phenol (Chloraseptic Throat Spray) 2 spray Q2H  PRN MT SORE THROAT Last 


administered on 3/13/19at 19:36; Admin Dose 2 SPRAY;  Start 3/13/19 at 17:30


Hydromorphone HCl (Dilaudid) 0.4 mg Q10MIN  PRN IV SEVERE PAIN LEVEL 7-10 Last 


administered on 3/15/19at 00:06; Admin Dose 0.4 MG;  Start 3/14/19 at 08:30


Naloxone HCl (Narcan) 0.2 mg Q2M  PRN IV .RESP RATE;  Start 3/14/19 at 10:30











HANNAH DOWNING MD          Mar 15, 2019 12:31

## 2019-03-15 NOTE — CONS
Assessment/Plan


Assessment/Plan


Hospital Course (Demo Recall)


IMPRESSION:


1.  Preoperative evaluation in a patient prior to colectomy that has multiple 


cardiac risk factors and possible prior stent apparently placed approximately 2 


to 3 years prior.-neg trop x 2. Now POD#1 s/p surgery


2.  Hypotension, borderline.-ongoing today


3.  History of possible prior stent placements 2 to 3 years prior per chart 


biopsy.


4.  Dyslipidemia.


5.  Adenocarcinoma of the colon, newly diagnosed.


6.  Abdominal pain secondary to adenocarcinoma of the colon.


7.  Diabetes mellitus.


8.  Anemia.


9.  Renal failure insufficiency.





Recc:


-Follow BP/volume status clsoely


-routine post-op care


-pain control


-Follow BS


-Follow NGT output


-Oncology following and awaiting pathology





Consultation Date/Type/Reason


Admit Date/Time


Mar 1, 2019 at 20:38


Initial Consult Date


3/10/19


Type of Consult


Cardiology


Reason for Consultation


Preop


Requesting Provider:  HANNAH DOWNING MD


Date/Time of Note


DATE: 3/15/19 


TIME: 16:31





Exam/Review of Systems


Vital Signs


Vitals





Vital Signs


  Date      Temp  Pulse  Resp  B/P (MAP)   Pulse Ox  O2          O2 Flow    FiO2


Time                                                 Delivery    Rate


   3/15/19  98.0     96    18      116/75        90  Room Air


     14:59                           (89)


   3/12/19                                                             2.0


     22:45








Intake and Output





3/14/19


3/14/19


3/15/19





1515:00


23:00


07:00





IntakeIntake Total


550 ml


410 ml


690 ml





OutputOutput Total


50 ml


1630 ml


1700 ml





BalanceBalance


500 ml


-1220 ml


-1010 ml














Exam


Exam


Review of Systems:


CONSTITUTIONAL:  No fevers, chills.


PULMONARY:  No sob


CARDIOVASCULAR: No chest pain/palpitations


GASTROINTESTINAL:  No nausea/vomiting.


GENITOURINARY:  No hematuria/dysuria.


MUSCULOSKELETAL:  No myagias/arthalgias.


PSYCHIATRIC:  The patient denies depression.


NEUROLOGIC:   No weakness


Constitutional:  alert


Psych:  no complaints


Head:  normocephalic


ENMT:  mucosa pink and moist


Neck:  supple, jvd (9cm water)


Respiratory:  diminished breath sounds


Cardiovascular:  regular rate and rhythm


Gastrointestinal:  soft, non-tender, other (covered by dressing)


Musculoskeletal:  muscle tone (normal)


Extremities:  edema (none)


Neurological:  other (No focal deficits)





Labs


Result Diagram:  


3/15/19 0424                                                                    


           3/15/19 0425





Results 24hrs





Laboratory Tests


Test
                 3/14/19
17:24  3/14/19
20:36  3/15/19
01:32  3/15/19
04:24


Bedside Glucose               141            108            100


White Blood Count                                                          8.8


Red Blood Count                                                          3.69  L


Hemoglobin                                                                9.3  L


Hematocrit                                                               29.7  L


Mean Corpuscular                                                         80.5  L


Volume


Mean Corpuscular                                                         25.2  L


Hemoglobin


Mean Corpuscular      
              
              
                   31.3  L



Hemoglobin
Concent


Red Cell                                                                 16.8  H


Distribution Width


Platelet Count                                                             291


Mean Platelet Volume                                                      10.1


Immature                                                                0.700  H


Granulocytes %


Neutrophils %                                                             70.3


Lymphocytes %                                                             22.8


Monocytes %                                                                5.0


Eosinophils %                                                              1.0


Basophils %                                                                0.2


Nucleated Red Blood                                                        0.0


Cells %


Immature                                                                0.060  H


Granulocytes #


Neutrophils #                                                              6.2


Lymphocytes #                                                              2.0


Monocytes #                                                                0.4


Eosinophils #                                                              0.1


Basophils #                                                                0.0


Nucleated Red Blood                                                        0.0


Cells #


Test
                 3/15/19
04:25  3/15/19
04:52  3/15/19
08:43  3/15/19
10:11


Sodium Level                  138


Potassium Level               4.0


Chloride Level                106


Carbon Dioxide Level           23


Anion Gap                       9


Blood Urea Nitrogen            11


Creatinine                   1.07


Est Glomerular        > 60  
        
              
              



Filtrat Rate
mL/min


Glucose Level                 95  #


Calcium Level                 8.7


Bedside Glucose                               92            108            111


Test
                 3/15/19
12:28  
              
              



Bedside Glucose               113








Medications


Medications





Current Medications


IV Flush (NS 3 ml) 3 ml PER PROTOCOL IV ;  Start 3/2/19 at 00:00


Zolpidem Tartrate (Ambien) 10 mg QHS  PRN PO .INSOMNIA Last administered on 


3/12/19at 01:14; Admin Dose 10 MG;  Start 3/2/19 at 00:00


Albuterol/ Ipratropium (Duoneb) 3 ml Q2H RESP THERAPY  PRN HHN SHORTNESS OF 


BREATH;  Start 3/2/19 at 00:00


Miscellaneous Information 1 ea NOTE XX ;  Start 3/2/19 at 00:00


Glucose (Glutose) 15 gm Q15M  PRN PO DECREASED GLUCOSE;  Start 3/2/19 at 00:00


Glucose (Glutose) 22.5 gm Q15M  PRN PO DECREASED GLUCOSE;  Start 3/2/19 at 00:00


Dextrose (D50w Syringe) 25 ml Q15M  PRN IV DECREASED GLUCOSE;  Start 3/2/19 at 


00:00


Dextrose (D50w Syringe) 50 ml Q15M  PRN IV DECREASED GLUCOSE;  Start 3/2/19 at 


00:00


Glucagon (Glucagen) 1 mg Q15M  PRN IM DECREASED GLUCOSE;  Start 3/2/19 at 00:00


Glucose (Glutose) 15 gm Q15M  PRN BUCCAL DECREASED GLUCOSE;  Start 3/2/19 at 


00:00


Insulin Aspart (Novolog Insulin Pen) 8 unit WITH  MEALS SC  Last administered on


3/11/19at 17:58; Admin Dose 8 UNIT;  Start 3/9/19 at 18:00


Insulin Glargine (Lantus) 25 units DAILY@0800 SC  Last administered on 3/15/19at


09:42; Admin Dose 25 UNITS;  Start 3/11/19 at 08:00


Hydromorphone HCl (Dilaudid) 1 mg Q4H  PRN IV SEVERE PAIN LEVEL 7-10 Last 


administered on 3/14/19at 05:09; Admin Dose 1 MG;  Start 3/11/19 at 17:00


Insulin Aspart (Novolog Insulin Pen) NOVOLOG *MODERATE* ALGORITHM Q4 SC  Last 


administered on 3/13/19at 17:02; Admin Dose 4 UNIT;  Start 3/12/19 at 09:00


Sodium Chloride 1,000 ml @  50 mls/hr Q20H IV  Last administered on 3/15/19at 


03:11; Admin Dose 50 MLS/HR;  Start 3/12/19 at 10:00


Morphine Sulfate (morphine) 2 mg Q2H  PRN IV breakthrough pain;  Start 3/12/19 


at 18:30


Acetaminophen/ Hydrocodone Bitart (Norco (5/325)) 1 tab Q6H  PRN PO PAIN LEVEL 


6-10;  Start 3/12/19 at 18:30


Acetaminophen (Tylenol Tab) 650 mg Q6H  PRN PO MILD PAIN(1-3)OR ELEVATED TEMP;  


Start 3/12/19 at 18:30


Ibuprofen (Motrin) 600 mg Q6H  PRN PO PAIN LEVEL 1-5;  Start 3/12/19 at 18:30


Ondansetron HCl (Zofran Inj) 4 mg Q6H  PRN IV NAUSEA AND/OR VOMITING Last 


administered on 3/14/19at 12:08; Admin Dose 4 MG;  Start 3/12/19 at 18:30


Miscellaneous Information (* Miscellaneous Pharmacy Order) DURAMORPH: 5 MG 


EPIDU... GIVEN NEURAXIAL XX ;  Start 3/12/19 at 21:00


Phenol (Chloraseptic Throat Spray) 2 spray Q2H  PRN MT SORE THROAT Last 


administered on 3/13/19at 19:36; Admin Dose 2 SPRAY;  Start 3/13/19 at 17:30


Hydromorphone HCl (Dilaudid) 0.4 mg Q10MIN  PRN IV SEVERE PAIN LEVEL 7-10 Last 


administered on 3/15/19at 00:06; Admin Dose 0.4 MG;  Start 3/14/19 at 08:30


Naloxone HCl (Narcan) 0.2 mg Q2M  PRN IV .RESP RATE;  Start 3/14/19 at 10:30


Fentanyl/ Ropivacaine 100 ml CONT EPIDURAL EPI ;  Start 3/15/19 at 10:15


Zolpidem Tartrate (Ambien) 5 mg HS  PRN PO INSOMNIA;  Start 3/15/19 at 16:00











SHERINE GOLDSMITH               Mar 15, 2019 16:33

## 2019-03-15 NOTE — OPPN
Date/Time of Note


Date/Time of Note


DATE: 3/15/19 


TIME: 10:09





Anesthesia Follow up


Anesthesia Follow up


Last documented vital signs





Vital Signs


  Date      Temp  Pulse  Resp  B/P (MAP)   Pulse Ox  O2          O2 Flow    FiO2


Time                                                 Delivery    Rate


   3/15/19                 20


     09:00


   3/15/19  98.2     98            113/62        93  Room Air


     08:13                           (79)


   3/12/19                                                             2.0


     22:45





Respiratory function:  WNL


Cardiovascular function:  WNL


Comments


POD#3


Patient is a 58 yr old with Hs of recently Dx AdenoCA of Transverse Colon, went 


under GETA yesterday for Laparoscopic to Open Transverse Colectomy and Lysis of 


Adhesion.


Low Thoracic Epidural Catheter placed for post op pain management as well as 


Bilateral TAP block was done at the end of the surgery.


Patient has a Hx of DM, HTN but otherwise stable. He moves all extremity and no 


weakness reported, His vital signs have been stable, he is afebrile. His pain is


well controlled yesterday only one dose of diluadid was given for breakthrough 


pain. The Epidural infusion was increased to 11cc/hr.


Will continue the Epidural infusion and will follow up. Recommended to DC the 


Simpson, also ambulate him more. He was instructed to use Spirometry more often.


Epidural Catheter is intact and dressing is clean. Will follow.











KENNY CABELLO MD              Mar 15, 2019 10:11

## 2019-03-16 VITALS — DIASTOLIC BLOOD PRESSURE: 60 MMHG | SYSTOLIC BLOOD PRESSURE: 108 MMHG | HEART RATE: 83 BPM | RESPIRATION RATE: 18 BRPM

## 2019-03-16 VITALS — HEART RATE: 84 BPM | DIASTOLIC BLOOD PRESSURE: 75 MMHG | RESPIRATION RATE: 18 BRPM | SYSTOLIC BLOOD PRESSURE: 116 MMHG

## 2019-03-16 VITALS — RESPIRATION RATE: 20 BRPM | DIASTOLIC BLOOD PRESSURE: 74 MMHG | HEART RATE: 84 BPM | SYSTOLIC BLOOD PRESSURE: 119 MMHG

## 2019-03-16 VITALS — SYSTOLIC BLOOD PRESSURE: 123 MMHG | RESPIRATION RATE: 20 BRPM | HEART RATE: 71 BPM | DIASTOLIC BLOOD PRESSURE: 73 MMHG

## 2019-03-16 RX ADMIN — INSULIN ASPART 1 UNIT: 100 INJECTION, SOLUTION INTRAVENOUS; SUBCUTANEOUS at 13:00

## 2019-03-16 RX ADMIN — INSULIN ASPART 1 UNIT: 100 INJECTION, SOLUTION INTRAVENOUS; SUBCUTANEOUS at 21:00

## 2019-03-16 RX ADMIN — HYDROMORPHONE HYDROCHLORIDE 1 MG: 1 INJECTION, SOLUTION INTRAMUSCULAR; INTRAVENOUS; SUBCUTANEOUS at 23:08

## 2019-03-16 RX ADMIN — HYDROMORPHONE HYDROCHLORIDE PRN MG: 1 INJECTION, SOLUTION INTRAMUSCULAR; INTRAVENOUS; SUBCUTANEOUS at 00:44

## 2019-03-16 RX ADMIN — INSULIN ASPART 1 UNIT: 100 INJECTION, SOLUTION INTRAVENOUS; SUBCUTANEOUS at 05:00

## 2019-03-16 RX ADMIN — HYDROMORPHONE HYDROCHLORIDE 1 MG: 1 INJECTION, SOLUTION INTRAMUSCULAR; INTRAVENOUS; SUBCUTANEOUS at 12:44

## 2019-03-16 RX ADMIN — THIAMINE HYDROCHLORIDE 1 MLS/HR: 100 INJECTION, SOLUTION INTRAMUSCULAR; INTRAVENOUS at 17:32

## 2019-03-16 RX ADMIN — ONDANSETRON HYDROCHLORIDE 1 MG: 2 INJECTION, SOLUTION INTRAMUSCULAR; INTRAVENOUS at 17:40

## 2019-03-16 RX ADMIN — FENTANYL CIT 0.2 MG/100ML-ROPIV 0.2%-NACL 0.9% EPIDURAL INJ SCH ML: 2/0.2 SOLUTION at 02:41

## 2019-03-16 RX ADMIN — HYDROMORPHONE HYDROCHLORIDE 1 MG: 1 INJECTION, SOLUTION INTRAMUSCULAR; INTRAVENOUS; SUBCUTANEOUS at 07:58

## 2019-03-16 RX ADMIN — PHENOL 1 SPRAY: 1.4 SPRAY ORAL at 21:48

## 2019-03-16 RX ADMIN — HYDROMORPHONE HYDROCHLORIDE PRN MG: 1 INJECTION, SOLUTION INTRAMUSCULAR; INTRAVENOUS; SUBCUTANEOUS at 07:58

## 2019-03-16 RX ADMIN — HYDROMORPHONE HYDROCHLORIDE PRN MG: 1 INJECTION, SOLUTION INTRAMUSCULAR; INTRAVENOUS; SUBCUTANEOUS at 23:08

## 2019-03-16 RX ADMIN — HYDROMORPHONE HYDROCHLORIDE PRN MG: 1 INJECTION, SOLUTION INTRAMUSCULAR; INTRAVENOUS; SUBCUTANEOUS at 18:20

## 2019-03-16 RX ADMIN — DEXAMETHASONE SODIUM PHOSPHATE PRN MG: 10 INJECTION, SOLUTION INTRAMUSCULAR; INTRAVENOUS at 17:40

## 2019-03-16 RX ADMIN — FENTANYL CIT 0.2 MG/100ML-ROPIV 0.2%-NACL 0.9% EPIDURAL INJ SCH ML: 2/0.2 SOLUTION at 19:59

## 2019-03-16 RX ADMIN — HYDROMORPHONE HYDROCHLORIDE PRN MG: 1 INJECTION, SOLUTION INTRAMUSCULAR; INTRAVENOUS; SUBCUTANEOUS at 12:44

## 2019-03-16 RX ADMIN — FENTANYL CIT 0.2 MG/100ML-ROPIV 0.2%-NACL 0.9% EPIDURAL INJ 1 ML: 2/0.2 SOLUTION at 11:08

## 2019-03-16 RX ADMIN — ZOLPIDEM TARTRATE PRN MG: 5 TABLET, FILM COATED ORAL at 22:03

## 2019-03-16 RX ADMIN — FENTANYL CIT 0.2 MG/100ML-ROPIV 0.2%-NACL 0.9% EPIDURAL INJ SCH ML: 2/0.2 SOLUTION at 11:08

## 2019-03-16 RX ADMIN — DEXAMETHASONE SODIUM PHOSPHATE PRN MG: 10 INJECTION, SOLUTION INTRAMUSCULAR; INTRAVENOUS at 05:41

## 2019-03-16 RX ADMIN — INSULIN ASPART 1 UNIT: 100 INJECTION, SOLUTION INTRAVENOUS; SUBCUTANEOUS at 11:40

## 2019-03-16 RX ADMIN — HYDROMORPHONE HYDROCHLORIDE 1 MG: 1 INJECTION, SOLUTION INTRAMUSCULAR; INTRAVENOUS; SUBCUTANEOUS at 18:20

## 2019-03-16 RX ADMIN — INSULIN ASPART 1 UNIT: 100 INJECTION, SOLUTION INTRAVENOUS; SUBCUTANEOUS at 07:41

## 2019-03-16 RX ADMIN — ZOLPIDEM TARTRATE 1 MG: 5 TABLET, FILM COATED ORAL at 22:03

## 2019-03-16 RX ADMIN — FOLIC ACID SCH MLS/HR: 5 INJECTION, SOLUTION INTRAMUSCULAR; INTRAVENOUS; SUBCUTANEOUS at 17:32

## 2019-03-16 RX ADMIN — ONDANSETRON HYDROCHLORIDE 1 MG: 2 INJECTION, SOLUTION INTRAMUSCULAR; INTRAVENOUS at 05:41

## 2019-03-16 RX ADMIN — INSULIN ASPART 1 UNIT: 100 INJECTION, SOLUTION INTRAVENOUS; SUBCUTANEOUS at 01:00

## 2019-03-16 RX ADMIN — INSULIN ASPART 1 UNIT: 100 INJECTION, SOLUTION INTRAVENOUS; SUBCUTANEOUS at 17:55

## 2019-03-16 RX ADMIN — INSULIN ASPART 1 UNIT: 100 INJECTION, SOLUTION INTRAVENOUS; SUBCUTANEOUS at 17:00

## 2019-03-16 RX ADMIN — FENTANYL CIT 0.2 MG/100ML-ROPIV 0.2%-NACL 0.9% EPIDURAL INJ 1 ML: 2/0.2 SOLUTION at 19:59

## 2019-03-16 RX ADMIN — INSULIN ASPART 1 UNIT: 100 INJECTION, SOLUTION INTRAVENOUS; SUBCUTANEOUS at 09:00

## 2019-03-16 RX ADMIN — FENTANYL CIT 0.2 MG/100ML-ROPIV 0.2%-NACL 0.9% EPIDURAL INJ 1 ML: 2/0.2 SOLUTION at 02:41

## 2019-03-16 RX ADMIN — HYDROMORPHONE HYDROCHLORIDE 1 MG: 1 INJECTION, SOLUTION INTRAMUSCULAR; INTRAVENOUS; SUBCUTANEOUS at 00:44

## 2019-03-16 RX ADMIN — PHENOL PRN SPRAY: 1.5 LIQUID ORAL at 21:48

## 2019-03-16 NOTE — PN
Date/Time of Note


Date/Time of Note


DATE: 3/16/19 


TIME: 15:55





Assessment/Plan


VTE Prophylaxis


Risk score (from Nsg)>0 risk:  5


SCD applied (from Ns):  Yes


Pharmacological prophylaxis:  NA/contraindicated


Pharm contraindication:  low risk/ambulating





Lines/Catheters


IV Catheter Type (from Nrsg):  Saline Lock


Urinary Cath still in place:  Yes


Reason Cath still needed:  urinary retention





Assessment/Plan


Assessment/Plan


59 yo male with DM who presented with hematochezia.  Workup has revealed colon 


adenocarcinoma.  No w s/p open hemicolectomy POD 2





Colon adenocarcinoma:


- Bone scan negative for mets


- s/p colonoscopy with path showing adenocarcinoma


- Dr Mcintyre following





s/p colectomy


- Hemicolectomy performed 3/12.  Periop management per surgery


- Await return of bowel function


- epidural/pain management per anesthesia


- IV fluids while NPO





Iron deficiency anemia:


- IV iron





Diabetes


- Basal/bolus insulin





CKD II:


- stable





Prophylaxis: SCDs





DC planning: Likely to home pending surgery clearance


Result Diagram:  


3/15/19 0424                                                                    


           3/15/19 0425








Subjective


24 Hr Interval Summary


Free Text/Dictation


No acute overnight events. 


Pain adequately controlled


Had copious NGT output yesterday but minimal today.





Exam/Review of Systems


Exam


Vitals





Vital Signs


  Date      Temp  Pulse  Resp  B/P (MAP)   Pulse Ox  O2          O2 Flow    FiO2


Time                                                 Delivery    Rate


   3/16/19  98.2     83    18      108/60        98  Room Air


     14:30                           (76)


   3/12/19                                                             2.0


     22:45








Intake and Output





3/15/19


3/15/19


3/16/19





1515:00


23:00


07:00





IntakeIntake Total


900 ml


400 ml





OutputOutput Total


720 ml


890 ml


1140 ml





BalanceBalance


-720 ml


10 ml


-740 ml











Exam


GENERAL:  Well-nourished, well-developed gentleman lying in bed no acute 


distress


HEENT:  Moist mucous membranes.  NG tube in place. 


CARDIAC:  S1, S2, no added sounds or murmurs.


CHEST:  Clear to auscultation bilaterally


ABDOMEN:  Soft, nondistended. Nontender to mild palpation. R CHA drain with some 


output, dressing c/d/i. 


EXTREMITIES:  No cyanosis, clubbing.





Results


Results 24hrs





Laboratory Tests


  Test
            3/15/19
17:54  3/15/19
21:26  3/16/19
01:27  3/16/19
05:59


  Bedside Glucose           84             77            103             91


  Test
            3/16/19
08:45  3/16/19
13:46  
              



  Bedside Glucose           75             80








Medications


Medication





Current Medications


IV Flush (NS 3 ml) 3 ml PER PROTOCOL IV ;  Start 3/2/19 at 00:00


Zolpidem Tartrate (Ambien) 10 mg QHS  PRN PO .INSOMNIA Last administered on 


3/12/19at 01:14; Admin Dose 10 MG;  Start 3/2/19 at 00:00


Albuterol/ Ipratropium (Duoneb) 3 ml Q2H RESP THERAPY  PRN HHN SHORTNESS OF 


BREATH;  Start 3/2/19 at 00:00


Miscellaneous Information 1 ea NOTE XX ;  Start 3/2/19 at 00:00


Glucose (Glutose) 15 gm Q15M  PRN PO DECREASED GLUCOSE;  Start 3/2/19 at 00:00


Glucose (Glutose) 22.5 gm Q15M  PRN PO DECREASED GLUCOSE;  Start 3/2/19 at 00:00


Dextrose (D50w Syringe) 25 ml Q15M  PRN IV DECREASED GLUCOSE;  Start 3/2/19 at 


00:00


Dextrose (D50w Syringe) 50 ml Q15M  PRN IV DECREASED GLUCOSE;  Start 3/2/19 at 


00:00


Glucagon (Glucagen) 1 mg Q15M  PRN IM DECREASED GLUCOSE;  Start 3/2/19 at 00:00


Glucose (Glutose) 15 gm Q15M  PRN BUCCAL DECREASED GLUCOSE;  Start 3/2/19 at 


00:00


Insulin Aspart (Novolog Insulin Pen) 8 unit WITH  MEALS SC  Last administered on


3/11/19at 17:58; Admin Dose 8 UNIT;  Start 3/9/19 at 18:00


Hydromorphone HCl (Dilaudid) 1 mg Q4H  PRN IV SEVERE PAIN LEVEL 7-10 Last 


administered on 3/16/19at 12:44; Admin Dose 1 MG;  Start 3/11/19 at 17:00


Insulin Aspart (Novolog Insulin Pen) NOVOLOG *MODERATE* ALGORITHM Q4 SC  Last 


administered on 3/13/19at 17:02; Admin Dose 4 UNIT;  Start 3/12/19 at 09:00


Sodium Chloride 1,000 ml @  50 mls/hr Q20H IV  Last administered on 3/15/19at 


21:26; Admin Dose 50 MLS/HR;  Start 3/12/19 at 10:00


Morphine Sulfate (morphine) 2 mg Q2H  PRN IV breakthrough pain;  Start 3/12/19 


at 18:30


Acetaminophen/ Hydrocodone Bitart (Norco (5/325)) 1 tab Q6H  PRN PO PAIN LEVEL 


6-10;  Start 3/12/19 at 18:30


Acetaminophen (Tylenol Tab) 650 mg Q6H  PRN PO MILD PAIN(1-3)OR ELEVATED TEMP;  


Start 3/12/19 at 18:30


Ibuprofen (Motrin) 600 mg Q6H  PRN PO PAIN LEVEL 1-5;  Start 3/12/19 at 18:30


Ondansetron HCl (Zofran Inj) 4 mg Q6H  PRN IV NAUSEA AND/OR VOMITING Last 


administered on 3/16/19at 05:41; Admin Dose 4 MG;  Start 3/12/19 at 18:30


Miscellaneous Information (* Miscellaneous Pharmacy Order) DURAMORPH: 5 MG 


EPIDU... GIVEN NEURAXIAL XX ;  Start 3/12/19 at 21:00


Phenol (Chloraseptic Throat Spray) 2 spray Q2H  PRN MT SORE THROAT Last 


administered on 3/15/19at 23:50; Admin Dose 2 SPRAY;  Start 3/13/19 at 17:30


Hydromorphone HCl (Dilaudid) 0.4 mg Q10MIN  PRN IV SEVERE PAIN LEVEL 7-10 Last 


administered on 3/15/19at 00:06; Admin Dose 0.4 MG;  Start 3/14/19 at 08:30


Naloxone HCl (Narcan) 0.2 mg Q2M  PRN IV .RESP RATE;  Start 3/14/19 at 10:30


Fentanyl/ Ropivacaine 100 ml CONT EPIDURAL EPI  Last administered on 3/16/19at 1


1:08; Admin Dose 100 ML;  Start 3/15/19 at 10:15


Zolpidem Tartrate (Ambien) 5 mg HS  PRN PO INSOMNIA Last administered on 


3/15/19at 21:25; Admin Dose 5 MG;  Start 3/15/19 at 16:00


Insulin Glargine (Lantus) 15 units DAILY@0800 SC ;  Start 3/17/19 at 08:00











SHERINE LOCKETT MD              Mar 16, 2019 15:57

## 2019-03-16 NOTE — OPPN
Date/Time of Note


Date/Time of Note


DATE: 3/16/19 


TIME: 14:44





Anesthesia Follow up


Anesthesia Follow up


Last documented vital signs





Vital Signs


  Date      Temp  Pulse  Resp  B/P (MAP)   Pulse Ox  O2          O2 Flow    FiO2


Time                                                 Delivery    Rate


   3/16/19  98.2     83    18      108/60        98  Room Air


     14:30                           (76)


   3/12/19                                                             2.0


     22:45





Respiratory function:  WNL


Cardiovascular function:  WNL


Comments


POD#4


Patient is a 58 yr old with Hs of recently Dx AdenoCA of Transverse Colon, went 


under GETA yesterday for Laparoscopic to Open Transverse Colectomy and Lysis of 


Adhesion.


Low Thoracic Epidural Catheter placed for post op pain management as well as 


Bilateral TAP block was done at the end of the surgery.


Patient has a Hx of DM, HTN but otherwise stable. He moves all extremity and no 


weakness reported, His vital signs have been stable, he is afebrile. His pain is


well controlled yesterday only one dose of diluadid was given for breakthrough 


pain. The Epidural infusion running at 11cc/hr.


Will continue the Epidural infusion and will follow up. Recommended to DC the 


Simpson, also ambulate him more. He was instructed to use Spirometry more often.


Epidural Catheter is intact and dressing is clean. Will follow. 


Plan Epidural Catheter will DC as soon as the patient would have bowel motility 


to avoid potential side effects of using narcotic for pain control after the 


Epidural taken out.











KENNY CABELLO MD              Mar 16, 2019 14:47

## 2019-03-16 NOTE — CONS
Consult Date/Type/Reason


Admit Date/Time


Mar 1, 2019 at 20:38


Initial Consult Date


3/10/19


Requesting Provider:  HANNAH DOWNING MD


Date/Time of Note


DATE: 3/16/19 


TIME: 13:31





Subjective


NO acute events - BP in good range - No CP. Tolerated well - NGT in - con't 


supportive RX. 





ROS: No fever, no chills, no nausea, no vomiting, abd post op 


        No recent weight changes


        No chest pain, no PND, no orthopnea 


        No dizziness, blurred vision


        No thirst, no heat or cold intolerance





Objective


Vitals





Vital Signs


  Date      Temp  Pulse  Resp  B/P (MAP)   Pulse Ox  O2          O2 Flow    FiO2


Time                                                 Delivery    Rate


   3/16/19  98.3     84    18      116/75        98  Room Air


     08:00                           (89)


   3/12/19                                                             2.0


     22:45








Intake and Output





3/15/19


3/15/19


3/16/19





1515:00


23:00


07:00





IntakeIntake Total


900 ml


400 ml





OutputOutput Total


720 ml


890 ml


1140 ml





BalanceBalance


-720 ml


10 ml


-740 ml











Exam


General: WN/WD/NAD, AOx 203 


HEENT: Unicetric/atraumatic/EOMI ( follows commands) - NGT in 


NECK: JVD elevated, no thyromegaly


Lymph: no lymphadenopathy


HEART: regular with no S3, II/VI systolic murmur at apex


LUNGS: Coarse sounds


ABD: post op 


: Intact


Neuro: non focal


SKIN: chronic changes


EXT: trace edema





Results/Medications


Result Diagram:  


3/15/19 0424                                                                    


           3/15/19 0425





Results 24 hrs





Laboratory Tests


  Test
            3/15/19
17:54  3/15/19
21:26  3/16/19
01:27  3/16/19
05:59


  Bedside Glucose           84             77            103             91


  Test
            3/16/19
08:45  
              
              



  Bedside Glucose           75





Medications





Current Medications


IV Flush (NS 3 ml) 3 ml PER PROTOCOL IV ;  Start 3/2/19 at 00:00


Zolpidem Tartrate (Ambien) 10 mg QHS  PRN PO .INSOMNIA Last administered on 


3/12/19at 01:14; Admin Dose 10 MG;  Start 3/2/19 at 00:00


Albuterol/ Ipratropium (Duoneb) 3 ml Q2H RESP THERAPY  PRN HHN SHORTNESS OF 


BREATH;  Start 3/2/19 at 00:00


Miscellaneous Information 1 ea NOTE XX ;  Start 3/2/19 at 00:00


Glucose (Glutose) 15 gm Q15M  PRN PO DECREASED GLUCOSE;  Start 3/2/19 at 00:00


Glucose (Glutose) 22.5 gm Q15M  PRN PO DECREASED GLUCOSE;  Start 3/2/19 at 00:00


Dextrose (D50w Syringe) 25 ml Q15M  PRN IV DECREASED GLUCOSE;  Start 3/2/19 at 


00:00


Dextrose (D50w Syringe) 50 ml Q15M  PRN IV DECREASED GLUCOSE;  Start 3/2/19 at 


00:00


Glucagon (Glucagen) 1 mg Q15M  PRN IM DECREASED GLUCOSE;  Start 3/2/19 at 00:00


Glucose (Glutose) 15 gm Q15M  PRN BUCCAL DECREASED GLUCOSE;  Start 3/2/19 at 


00:00


Insulin Aspart (Novolog Insulin Pen) 8 unit WITH  MEALS SC  Last administered on


3/11/19at 17:58; Admin Dose 8 UNIT;  Start 3/9/19 at 18:00


Hydromorphone HCl (Dilaudid) 1 mg Q4H  PRN IV SEVERE PAIN LEVEL 7-10 Last 


administered on 3/16/19at 12:44; Admin Dose 1 MG;  Start 3/11/19 at 17:00


Insulin Aspart (Novolog Insulin Pen) NOVOLOG *MODERATE* ALGORITHM Q4 SC  Last 


administered on 3/13/19at 17:02; Admin Dose 4 UNIT;  Start 3/12/19 at 09:00


Sodium Chloride 1,000 ml @  50 mls/hr Q20H IV  Last administered on 3/15/19at 


21:26; Admin Dose 50 MLS/HR;  Start 3/12/19 at 10:00


Morphine Sulfate (morphine) 2 mg Q2H  PRN IV breakthrough pain;  Start 3/12/19 


at 18:30


Acetaminophen/ Hydrocodone Bitart (Norco (5/325)) 1 tab Q6H  PRN PO PAIN LEVEL 


6-10;  Start 3/12/19 at 18:30


Acetaminophen (Tylenol Tab) 650 mg Q6H  PRN PO MILD PAIN(1-3)OR ELEVATED TEMP;  


Start 3/12/19 at 18:30


Ibuprofen (Motrin) 600 mg Q6H  PRN PO PAIN LEVEL 1-5;  Start 3/12/19 at 18:30


Ondansetron HCl (Zofran Inj) 4 mg Q6H  PRN IV NAUSEA AND/OR VOMITING Last 


administered on 3/16/19at 05:41; Admin Dose 4 MG;  Start 3/12/19 at 18:30


Miscellaneous Information (* Miscellaneous Pharmacy Order) DURAMORPH: 5 MG 


EPIDU... GIVEN NEURAXIAL XX ;  Start 3/12/19 at 21:00


Phenol (Chloraseptic Throat Spray) 2 spray Q2H  PRN MT SORE THROAT Last 


administered on 3/15/19at 23:50; Admin Dose 2 SPRAY;  Start 3/13/19 at 17:30


Hydromorphone HCl (Dilaudid) 0.4 mg Q10MIN  PRN IV SEVERE PAIN LEVEL 7-10 Last 


administered on 3/15/19at 00:06; Admin Dose 0.4 MG;  Start 3/14/19 at 08:30


Naloxone HCl (Narcan) 0.2 mg Q2M  PRN IV .RESP RATE;  Start 3/14/19 at 10:30


Fentanyl/ Ropivacaine 100 ml CONT EPIDURAL EPI  Last administered on 3/16/19at 


11:08; Admin Dose 100 ML;  Start 3/15/19 at 10:15


Zolpidem Tartrate (Ambien) 5 mg HS  PRN PO INSOMNIA Last administered on 


3/15/19at 21:25; Admin Dose 5 MG;  Start 3/15/19 at 16:00


Insulin Glargine (Lantus) 15 units DAILY@0800 SC ;  Start 3/17/19 at 08:00





Assessment/Plan


Hospital Course (Demo Recall)


1.  Preoperative evaluation in a patient prior to colectomy that has multiple 


cardiac risk factors and possible prior stent apparently placed approximately 2 


to 3 years prior.-neg trop x 2. Doing well post op. 


2. Low BP -  better now, OK to hydrate as needed.


3.  History of possible prior stent placements 2 to 3 years prior per chart 


biopsy. Tolerated procedure well. 


4.  Dyslipidemia.


5.  Adenocarcinoma of the colon, newly diagnosed - post resection. 


6.  Abdominal pain secondary to adenocarcinoma of the colon.


7.  Diabetes mellitus- on meds. 


8.  Anemia.


9.  Renal failure insufficiency- CR 1.07 - will follow.











MAGDALENO US MD                 Mar 16, 2019 13:36

## 2019-03-17 VITALS — RESPIRATION RATE: 18 BRPM | SYSTOLIC BLOOD PRESSURE: 116 MMHG | HEART RATE: 90 BPM | DIASTOLIC BLOOD PRESSURE: 58 MMHG

## 2019-03-17 VITALS — DIASTOLIC BLOOD PRESSURE: 77 MMHG | RESPIRATION RATE: 20 BRPM | HEART RATE: 86 BPM | SYSTOLIC BLOOD PRESSURE: 127 MMHG

## 2019-03-17 VITALS — DIASTOLIC BLOOD PRESSURE: 76 MMHG | RESPIRATION RATE: 18 BRPM | HEART RATE: 87 BPM | SYSTOLIC BLOOD PRESSURE: 127 MMHG

## 2019-03-17 VITALS — RESPIRATION RATE: 20 BRPM | DIASTOLIC BLOOD PRESSURE: 72 MMHG | HEART RATE: 79 BPM | SYSTOLIC BLOOD PRESSURE: 116 MMHG

## 2019-03-17 LAB
ADD MAN DIFF?: NO
ALANINE AMINOTRANSFERASE: 10 IU/L (ref 13–69)
ALBUMIN/GLOBULIN RATIO: 1
ALBUMIN: 3.3 G/DL (ref 3.3–4.9)
ALKALINE PHOSPHATASE: 76 IU/L (ref 42–121)
ANION GAP: 14 (ref 5–13)
ASPARTATE AMINO TRANSFERASE: 14 IU/L (ref 15–46)
BASOPHIL #: 0 10^3/UL (ref 0–0.1)
BASOPHILS %: 0.2 % (ref 0–2)
BILIRUBIN,DIRECT: 0 MG/DL (ref 0–0.2)
BILIRUBIN,TOTAL: 0.1 MG/DL (ref 0.2–1.3)
BLOOD UREA NITROGEN: 11 MG/DL (ref 7–20)
CALCIUM: 9.1 MG/DL (ref 8.4–10.2)
CARBON DIOXIDE: 19 MMOL/L (ref 21–31)
CHLORIDE: 104 MMOL/L (ref 97–110)
CREATININE: 0.91 MG/DL (ref 0.61–1.24)
EOSINOPHILS #: 0.2 10^3/UL (ref 0–0.5)
EOSINOPHILS %: 2.2 % (ref 0–7)
GLOBULIN: 3.3 G/DL (ref 1.3–3.2)
GLUCOSE: 103 MG/DL (ref 70–220)
HEMATOCRIT: 33.1 % (ref 42–52)
HEMOGLOBIN: 10.1 G/DL (ref 14–18)
IMMATURE GRANS #M: 0.06 10^3/UL (ref 0–0.03)
IMMATURE GRANS % (M): 0.7 % (ref 0–0.43)
LYMPHOCYTES #: 2.1 10^3/UL (ref 0.8–2.9)
LYMPHOCYTES %: 25.9 % (ref 15–51)
MAGNESIUM: 1.6 MG/DL (ref 1.7–2.5)
MEAN CORPUSCULAR HEMOGLOBIN: 24.9 PG (ref 29–33)
MEAN CORPUSCULAR HGB CONC: 30.5 G/DL (ref 32–37)
MEAN CORPUSCULAR VOLUME: 81.7 FL (ref 82–101)
MEAN PLATELET VOLUME: 9.7 FL (ref 7.4–10.4)
MONOCYTE #: 0.5 10^3/UL (ref 0.3–0.9)
MONOCYTES %: 5.6 % (ref 0–11)
NEUTROPHIL #: 5.2 10^3/UL (ref 1.6–7.5)
NEUTROPHILS %: 65.4 % (ref 39–77)
NUCLEATED RED BLOOD CELLS #: 0 10^3/UL (ref 0–0)
NUCLEATED RED BLOOD CELLS%: 0 /100WBC (ref 0–0)
PHOSPHORUS: 4.1 MG/DL (ref 2.5–4.9)
PLATELET COUNT: 354 10^3/UL (ref 140–415)
POTASSIUM: 4.3 MMOL/L (ref 3.5–5.1)
RED BLOOD COUNT: 4.05 10^6/UL (ref 4.7–6.1)
RED CELL DISTRIBUTION WIDTH: 16.8 % (ref 11.5–14.5)
SODIUM: 137 MMOL/L (ref 135–144)
TOTAL PROTEIN: 6.6 G/DL (ref 6.1–8.1)
WHITE BLOOD COUNT: 8 10^3/UL (ref 4.8–10.8)

## 2019-03-17 RX ADMIN — DIATRIZOATE MEGLUMINE AND DIATRIZOATE SODIUM 1 ML: 660; 100 LIQUID ORAL; RECTAL at 15:16

## 2019-03-17 RX ADMIN — ONDANSETRON HYDROCHLORIDE 1 MG: 2 INJECTION, SOLUTION INTRAMUSCULAR; INTRAVENOUS at 12:38

## 2019-03-17 RX ADMIN — HYDROMORPHONE HYDROCHLORIDE PRN MG: 1 INJECTION, SOLUTION INTRAMUSCULAR; INTRAVENOUS; SUBCUTANEOUS at 13:13

## 2019-03-17 RX ADMIN — INSULIN ASPART 1 UNIT: 100 INJECTION, SOLUTION INTRAVENOUS; SUBCUTANEOUS at 21:00

## 2019-03-17 RX ADMIN — HYDROMORPHONE HYDROCHLORIDE 1 MG: 1 INJECTION, SOLUTION INTRAMUSCULAR; INTRAVENOUS; SUBCUTANEOUS at 03:46

## 2019-03-17 RX ADMIN — INSULIN ASPART 1 UNIT: 100 INJECTION, SOLUTION INTRAVENOUS; SUBCUTANEOUS at 01:00

## 2019-03-17 RX ADMIN — INSULIN ASPART 1 UNIT: 100 INJECTION, SOLUTION INTRAVENOUS; SUBCUTANEOUS at 12:00

## 2019-03-17 RX ADMIN — MAGNESIUM SULFATE HEPTAHYDRATE 1 MLS/HR: 40 INJECTION, SOLUTION INTRAVENOUS at 12:01

## 2019-03-17 RX ADMIN — FENTANYL CIT 0.2 MG/100ML-ROPIV 0.2%-NACL 0.9% EPIDURAL INJ 1 ML: 2/0.2 SOLUTION at 05:14

## 2019-03-17 RX ADMIN — HYDROMORPHONE HYDROCHLORIDE PRN MG: 1 INJECTION, SOLUTION INTRAMUSCULAR; INTRAVENOUS; SUBCUTANEOUS at 09:07

## 2019-03-17 RX ADMIN — INSULIN ASPART 1 UNIT: 100 INJECTION, SOLUTION INTRAVENOUS; SUBCUTANEOUS at 11:40

## 2019-03-17 RX ADMIN — HYDROMORPHONE HYDROCHLORIDE PRN MG: 1 INJECTION, SOLUTION INTRAMUSCULAR; INTRAVENOUS; SUBCUTANEOUS at 03:46

## 2019-03-17 RX ADMIN — ZOLPIDEM TARTRATE 1 MG: 5 TABLET, FILM COATED ORAL at 23:21

## 2019-03-17 RX ADMIN — DEXAMETHASONE SODIUM PHOSPHATE PRN MG: 10 INJECTION, SOLUTION INTRAMUSCULAR; INTRAVENOUS at 12:38

## 2019-03-17 RX ADMIN — HYDROMORPHONE HYDROCHLORIDE 1 MG: 1 INJECTION, SOLUTION INTRAMUSCULAR; INTRAVENOUS; SUBCUTANEOUS at 13:13

## 2019-03-17 RX ADMIN — INSULIN ASPART 1 UNIT: 100 INJECTION, SOLUTION INTRAVENOUS; SUBCUTANEOUS at 05:00

## 2019-03-17 RX ADMIN — HYDROMORPHONE HYDROCHLORIDE PRN MG: 1 INJECTION, SOLUTION INTRAMUSCULAR; INTRAVENOUS; SUBCUTANEOUS at 18:43

## 2019-03-17 RX ADMIN — FENTANYL CIT 0.2 MG/100ML-ROPIV 0.2%-NACL 0.9% EPIDURAL INJ SCH ML: 2/0.2 SOLUTION at 13:45

## 2019-03-17 RX ADMIN — HYDROMORPHONE HYDROCHLORIDE PRN MG: 1 INJECTION, SOLUTION INTRAMUSCULAR; INTRAVENOUS; SUBCUTANEOUS at 23:11

## 2019-03-17 RX ADMIN — THIAMINE HYDROCHLORIDE 1 MLS/HR: 100 INJECTION, SOLUTION INTRAMUSCULAR; INTRAVENOUS at 21:14

## 2019-03-17 RX ADMIN — INSULIN GLARGINE 1 UNITS: 100 INJECTION, SOLUTION SUBCUTANEOUS at 08:00

## 2019-03-17 RX ADMIN — INSULIN ASPART 1 UNIT: 100 INJECTION, SOLUTION INTRAVENOUS; SUBCUTANEOUS at 07:50

## 2019-03-17 RX ADMIN — ZOLPIDEM TARTRATE PRN MG: 5 TABLET, FILM COATED ORAL at 23:21

## 2019-03-17 RX ADMIN — INSULIN GLARGINE SCH UNITS: 100 INJECTION, SOLUTION SUBCUTANEOUS at 08:00

## 2019-03-17 RX ADMIN — DEXAMETHASONE SODIUM PHOSPHATE PRN MG: 10 INJECTION, SOLUTION INTRAMUSCULAR; INTRAVENOUS at 06:14

## 2019-03-17 RX ADMIN — HYDROMORPHONE HYDROCHLORIDE 1 MG: 1 INJECTION, SOLUTION INTRAMUSCULAR; INTRAVENOUS; SUBCUTANEOUS at 23:11

## 2019-03-17 RX ADMIN — INSULIN ASPART 1 UNIT: 100 INJECTION, SOLUTION INTRAVENOUS; SUBCUTANEOUS at 18:54

## 2019-03-17 RX ADMIN — FENTANYL CIT 0.2 MG/100ML-ROPIV 0.2%-NACL 0.9% EPIDURAL INJ SCH ML: 2/0.2 SOLUTION at 05:14

## 2019-03-17 RX ADMIN — HYDROMORPHONE HYDROCHLORIDE 1 MG: 1 INJECTION, SOLUTION INTRAMUSCULAR; INTRAVENOUS; SUBCUTANEOUS at 18:43

## 2019-03-17 RX ADMIN — FENTANYL CIT 0.2 MG/100ML-ROPIV 0.2%-NACL 0.9% EPIDURAL INJ 1 ML: 2/0.2 SOLUTION at 13:45

## 2019-03-17 RX ADMIN — HYDROMORPHONE HYDROCHLORIDE 1 MG: 1 INJECTION, SOLUTION INTRAMUSCULAR; INTRAVENOUS; SUBCUTANEOUS at 09:07

## 2019-03-17 RX ADMIN — FENTANYL CIT 0.2 MG/100ML-ROPIV 0.2%-NACL 0.9% EPIDURAL INJ SCH ML: 2/0.2 SOLUTION at 22:09

## 2019-03-17 RX ADMIN — INSULIN ASPART 1 UNIT: 100 INJECTION, SOLUTION INTRAVENOUS; SUBCUTANEOUS at 09:00

## 2019-03-17 RX ADMIN — ONDANSETRON HYDROCHLORIDE 1 MG: 2 INJECTION, SOLUTION INTRAMUSCULAR; INTRAVENOUS at 06:14

## 2019-03-17 RX ADMIN — FENTANYL CIT 0.2 MG/100ML-ROPIV 0.2%-NACL 0.9% EPIDURAL INJ 1 ML: 2/0.2 SOLUTION at 22:09

## 2019-03-17 RX ADMIN — FOLIC ACID SCH MLS/HR: 5 INJECTION, SOLUTION INTRAMUSCULAR; INTRAVENOUS; SUBCUTANEOUS at 21:14

## 2019-03-17 NOTE — PN
Date/Time of Note


Date/Time of Note


DATE: 3/17/19 


TIME: 22:29





Assessment/Plan


Lines/Catheters


IV Catheter Type (from Nrs):  Peripheral IV


Amaya in Place (from Nrs):  No





Assessment/Plan


Chief Complaint/Hosp Course


1.  Transverse colon adenocarcinoma,  Previous small bowel resection with 


primary anastomosis (antecolic over the cancer) and gastrojejunostomy; Ventral 


incisional Swiss cheese type hernia: s/p   Laparoscopic converted to open 


transverse colectomy, Revision of previous scar, Primary repair of ventral 


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 


ileus.


-IS


-oob>ambulate qid


-chew gum


-ice pack to abdominal wall


-continue npo and ngt to liws until bowel function


-incision site local care


-await path


-adrien drain


-pain control


2.  40 pound weight loss secondary to above


3.  Diabetes mellitus type 2


-Nutrition medication optimization


4.  Hypertension


-Nutrition medication optimization


5.  Coronary artery disease with history of Plavix and aspirin


-Cardiac evaluation and optimization


6.  Iron deficiency anemia secondary to above


-Iron transfusion per hematology


-As above





Thank you





Subjective


24 Hr Interval Summary


No bowel function.  ADRIEN decreasing.  Nausea.  NGT output significant. No fevers, 


chills, sob, congested cough, cp, palpitations, ha, dizziness, vomiting, 


dysuria.  Labs noted.





Exam/Review of Systems


Vital Signs


Vitals





Vital Signs


  Date      Temp  Pulse  Resp  B/P (MAP)   Pulse Ox  O2          O2 Flow    FiO2


Time                                                 Delivery    Rate


   3/17/19  98.2     90    18      116/58        96


     19:48                           (77)


   3/17/19                                           Room Air


     14:15








Intake and Output





3/16/19


3/16/19


3/17/19





1515:00


23:00


07:00





IntakeIntake Total


1080 ml


600 ml





OutputOutput Total


1400 ml


845 ml





BalanceBalance


-320 ml


-245 ml














Exam


Free Text/Dictation


Constitutional:  alert, oriented; 


   No distress


Psych:  nl mood/affect; 


   No anxiety


Head:  normocephalic, atraumatic


Eyes:  nl conjunctiva, EOMI, PERRL; 


   No icteric


ENMT:  nl external ears & nose, mucosa pink and moist, ngt


Neck:  non-tender; 


   No jvd


Respiratory:  normal air movement; 


   No congested cough, No labored breathing


Cardiovascular:  regular rate and rhythm; 


   No edema


Gastrointestinal:  soft, tender (tanner-incisional: midline staple line: dry, no 


erythema, adrien serous)


   No distended, No rebound or guarding


Genitourinary - Male:  nl scrotum, amaya


Musculoskeletal:  nl extremities to inspection, nl gait and stance; 


   No joint tenderness


Extremities:  normal pulses; 


   No calf tenderness, No edema


Neurological:  nl mental status, nl speech, nl strength


Skin:  nl turgor; 


   No rash or lesions


Lymph:  nl lymph nodes





Results


Result Diagram:  


3/17/19 0420                                                                    


           3/17/19 0420














LINDA MACE MD              Mar 17, 2019 22:31

## 2019-03-17 NOTE — OPPN
Date/Time of Note


Date/Time of Note


DATE: 3/17/19 


TIME: 16:41





Anesthesia Follow up


Anesthesia Follow up


Last documented vital signs





Vital Signs


  Date      Temp  Pulse  Resp  B/P (MAP)   Pulse Ox  O2          O2 Flow    FiO2


Time                                                 Delivery    Rate


   3/17/19  97.8     87    18      127/76        96  Room Air


     14:15                           (93)





Respiratory function:  WNL


Cardiovascular function:  WNL


Comments


POD#5


Patient is a 58 yr old with Hs of recently Dx AdenoCA of Transverse Colon, went 


under GETA yesterday for Laparoscopic to Open Transverse Colectomy and Lysis of 


Adhesion.


Low Thoracic Epidural Catheter placed for post op pain management as well as 


Bilateral TAP block was done at the end of the surgery.


Patient has a Hx of DM, HTN but otherwise stable. He moves all extremity and no 


weakness reported, His vital signs have been stable, he is afebrile. His pain is


well controlled. The Epidural infusion running at 11cc/hr.


Will continue the Epidural infusion and will follow up. He was instructed to use


Spirometry more often.


Epidural Catheter is intact and dressing is clean. Simpson is been removed and he 


is able to void without problem.


Plan Epidural Catheter will DC as soon as the patient would have bowel motility 


to avoid potential side effects of using narcotic for pain control after the 


Epidural taken out. He complains of dry mouth and discomfort of NG tube, he is 


sitting at bed side and otherwise had no pain.











KENNY CABELLO MD              Mar 17, 2019 16:43

## 2019-03-17 NOTE — CONS
Consult Date/Type/Reason


Admit Date/Time


Mar 1, 2019 at 20:38


Initial Consult Date


3/10/19


Requesting Provider:  HANNAH DOWNING MD


Date/Time of Note


DATE: 3/17/19 


TIME: 09:49





Subjective


NO acute events - pt stable - no CP now - con't post op care - still abd 


discomfort - pt NPO. 





ROS: No fever, no chills, no nausea, no vomiting, no diarrhea/constipation


        No recent weight changes


        No chest pain, no PND, no orthopnea + abdom discomfort 


        No dizziness, blurred vision


        No thirst, no heat or cold intolerance





Objective


Vitals





Vital Signs


  Date      Temp  Pulse  Resp  B/P (MAP)   Pulse Ox  O2          O2 Flow    FiO2


Time                                                 Delivery    Rate


   3/17/19  98.1     86    20      127/77        97  Room Air


     08:12                           (94)








Intake and Output





3/16/19


3/16/19


3/17/19





1515:00


23:00


07:00





IntakeIntake Total


1080 ml


600 ml





OutputOutput Total


1400 ml


845 ml





BalanceBalance


-320 ml


-245 ml











Exam


General: WN/WD/NAD, AOx 3


HEENT: Unicetric/atraumatic/EOMI (follows commands)


NECK: JVD elevated, no thyromegaly


Lymph: no lymphadenopathy


HEART: regular with no S3, II/VI systolic murmur at apex


LUNGS: Coarse sounds


ABD: soft, NT, ND, +BS - post op, NGT in 


: Intact


Neuro: non focal


SKIN: chronic changes


EXT: trace edema





Results/Medications


Result Diagram:  


3/17/19 0420                                                                    


           3/17/19 0420





Results 24 hrs





Laboratory Tests


Test
                 3/16/19
13:46  3/16/19
17:42  3/16/19
17:43  3/16/19
21:49


Bedside Glucose                80            69  L           74             84


Test
                 3/17/19
01:13  3/17/19
04:20  3/17/19
05:16  3/17/19
09:33


Bedside Glucose                97                           104            134


White Blood Count                            8.0


Red Blood Count                            4.05  L


Hemoglobin                                 10.1  L


Hematocrit                                 33.1  L


Mean Corpuscular                           81.7  L


Volume


Mean Corpuscular                           24.9  L


Hemoglobin


Mean Corpuscular      
                   30.5  L
  
              



Hemoglobin
Concent


Red Cell                                   16.8  H


Distribution Width


Platelet Count                              354  #


Mean Platelet Volume                         9.7


Immature                                  0.700  H


Granulocytes %


Neutrophils %                               65.4


Lymphocytes %                               25.9


Monocytes %                                  5.6


Eosinophils %                                2.2


Basophils %                                  0.2


Nucleated Red Blood                          0.0


Cells %


Immature                                  0.060  H


Granulocytes #


Neutrophils #                                5.2


Lymphocytes #                                2.1


Monocytes #                                  0.5


Eosinophils #                                0.2


Basophils #                                  0.0


Nucleated Red Blood                          0.0


Cells #


Sodium Level                                 137


Potassium Level                              4.3


Chloride Level                               104


Carbon Dioxide Level                         19  L


Anion Gap                                    14  H


Blood Urea Nitrogen                           11


Creatinine                                  0.91


Est Glomerular        
              > 60  
        
              



Filtrat Rate
mL/min


Glucose Level                                103


Calcium Level                                9.1


Phosphorus Level                             4.1


Magnesium Level                             1.6  L


Total Bilirubin                             0.1  L


Direct Bilirubin                            0.00


Indirect Bilirubin                           0.1


Aspartate Amino       
                     14  L
  
              



Transf
(AST/SGOT)


Alanine               
                     10  L
  
              



Aminotransferase
(AL


T/SGPT)


Alkaline Phosphatase                          76


Total Protein                                6.6


Albumin                                      3.3


Globulin                                   3.30  H


Albumin/Globulin                            1.00


Ratio





Medications





Current Medications


IV Flush (NS 3 ml) 3 ml PER PROTOCOL IV ;  Start 3/2/19 at 00:00


Zolpidem Tartrate (Ambien) 10 mg QHS  PRN PO .INSOMNIA Last administered on 


3/12/19at 01:14; Admin Dose 10 MG;  Start 3/2/19 at 00:00


Albuterol/ Ipratropium (Duoneb) 3 ml Q2H RESP THERAPY  PRN HHN SHORTNESS OF 


BREATH;  Start 3/2/19 at 00:00


Miscellaneous Information 1 ea NOTE XX ;  Start 3/2/19 at 00:00


Glucose (Glutose) 15 gm Q15M  PRN PO DECREASED GLUCOSE;  Start 3/2/19 at 00:00


Glucose (Glutose) 22.5 gm Q15M  PRN PO DECREASED GLUCOSE;  Start 3/2/19 at 00:00


Dextrose (D50w Syringe) 25 ml Q15M  PRN IV DECREASED GLUCOSE;  Start 3/2/19 at 


00:00


Dextrose (D50w Syringe) 50 ml Q15M  PRN IV DECREASED GLUCOSE;  Start 3/2/19 at 


00:00


Glucagon (Glucagen) 1 mg Q15M  PRN IM DECREASED GLUCOSE;  Start 3/2/19 at 00:00


Glucose (Glutose) 15 gm Q15M  PRN BUCCAL DECREASED GLUCOSE;  Start 3/2/19 at 


00:00


Insulin Aspart (Novolog Insulin Pen) 8 unit WITH  MEALS SC  Last administered on


3/11/19at 17:58; Admin Dose 8 UNIT;  Start 3/9/19 at 18:00


Hydromorphone HCl (Dilaudid) 1 mg Q4H  PRN IV SEVERE PAIN LEVEL 7-10 Last 


administered on 3/17/19at 09:07; Admin Dose 1 MG;  Start 3/11/19 at 17:00


Insulin Aspart (Novolog Insulin Pen) NOVOLOG *MODERATE* ALGORITHM Q4 SC  Last 


administered on 3/13/19at 17:02; Admin Dose 4 UNIT;  Start 3/12/19 at 09:00


Sodium Chloride 1,000 ml @  50 mls/hr Q20H IV  Last administered on 3/16/19at 


17:32; Admin Dose 50 MLS/HR;  Start 3/12/19 at 10:00


Morphine Sulfate (morphine) 2 mg Q2H  PRN IV breakthrough pain;  Start 3/12/19 


at 18:30


Acetaminophen/ Hydrocodone Bitart (Norco (5/325)) 1 tab Q6H  PRN PO PAIN LEVEL 


6-10;  Start 3/12/19 at 18:30


Acetaminophen (Tylenol Tab) 650 mg Q6H  PRN PO MILD PAIN(1-3)OR ELEVATED TEMP;  


Start 3/12/19 at 18:30


Ibuprofen (Motrin) 600 mg Q6H  PRN PO PAIN LEVEL 1-5;  Start 3/12/19 at 18:30


Ondansetron HCl (Zofran Inj) 4 mg Q6H  PRN IV NAUSEA AND/OR VOMITING Last 


administered on 3/17/19at 06:14; Admin Dose 4 MG;  Start 3/12/19 at 18:30


Miscellaneous Information (* Miscellaneous Pharmacy Order) DURAMORPH: 5 MG 


EPIDU... GIVEN NEURAXIAL XX ;  Start 3/12/19 at 21:00


Phenol (Chloraseptic Throat Spray) 2 spray Q2H  PRN MT SORE THROAT Last 


administered on 3/16/19at 21:48; Admin Dose 2 SPRAY;  Start 3/13/19 at 17:30


Hydromorphone HCl (Dilaudid) 0.4 mg Q10MIN  PRN IV SEVERE PAIN LEVEL 7-10 Last 


administered on 3/15/19at 00:06; Admin Dose 0.4 MG;  Start 3/14/19 at 08:30


Naloxone HCl (Narcan) 0.2 mg Q2M  PRN IV .RESP RATE;  Start 3/14/19 at 10:30


Fentanyl/ Ropivacaine 100 ml CONT EPIDURAL EPI  Last administered on 3/17/19at 


05:14; Admin Dose 100 ML;  Start 3/15/19 at 10:15


Zolpidem Tartrate (Ambien) 5 mg HS  PRN PO INSOMNIA Last administered on 


3/16/19at 22:03; Admin Dose 5 MG;  Start 3/15/19 at 16:00


Insulin Glargine (Lantus) 15 units DAILY@0800 SC ;  Start 3/17/19 at 08:00


Magnesium Sulfate 50 ml @ 25 mls/hr ONCE  ONCE IVPB ;  Start 3/17/19 at 10:00;  


Stop 3/17/19 at 11:59





Assessment/Plan


Hospital Course (Demo Recall)


1.  Preoperative evaluation in a patient prior to colectomy that has multiple 


cardiac risk factors and possible prior stent apparently placed approximately 2 


to 3 years prior.-neg trop x 2. Doing well post op - con't surgical care. 


2. Low BP -  better now, OK to hydrate as needed. BETTER now. 


3.  History of possible prior stent placements 2 to 3 years prior per chart 


biopsy. Tolerated procedure well. 


4.  Dyslipidemia.


5.  Adenocarcinoma of the colon, newly diagnosed - post resection. 


6.  Abdominal pain secondary to adenocarcinoma of the colon.


7.  Diabetes mellitus- on meds. 


8.  Anemia- H/H stable. 


9.  Renal failure insufficiency- CR 0.91 - will follow.











MAGDALENO US MD                 Mar 17, 2019 09:52

## 2019-03-17 NOTE — PN
Date/Time of Note


Date/Time of Note


DATE: 3/17/19 


TIME: 14:21





Assessment/Plan


VTE Prophylaxis


Risk score (from Ns)>0 risk:  8


SCD applied (from [facility]):  Yes


Pharmacological prophylaxis:  NA/contraindicated


Pharm contraindication:  surgical contra





Lines/Catheters


IV Catheter Type (from [facility]):  Saline Lock


Urinary Cath still in place:  No





Assessment/Plan


Assessment/Plan


59 yo male with DM who presented with hematochezia.  Workup has revealed colon 


adenocarcinoma.  No w s/p open hemicolectomy POD 2





Colon adenocarcinoma:


- Bone scan negative for mets


- s/p colonoscopy with path showing adenocarcinoma


- Dr Mcintyre following





s/p colectomy


- Hemicolectomy performed 3/12.  Periop management per surgery


- Await return of bowel function


- epidural/pain management per anesthesia


- IV fluids while NPO


- Today will order clamping trial of NG tube to eval if patient can take PO. 





Iron deficiency anemia:


- IV iron





Diabetes


- Basal/bolus insulin





CKD II:


- stable





Prophylaxis: SCDs





DC planning: Likely to home pending surgery clearance


Result Diagram:  


3/17/19 0420                                                                    


           3/17/19 0420








Subjective


24 Hr Interval Summary


Free Text/Dictation


No acute overnight events.


Continues to have green output from NG tube. About 400cc over past 24 hours.


Abdominal pain adequately controlled.





Exam/Review of Systems


Exam


Vitals





Vital Signs


  Date      Temp  Pulse  Resp  B/P (MAP)   Pulse Ox  O2          O2 Flow    FiO2


Time                                                 Delivery    Rate


   3/17/19  97.8     87    18      127/76        96  Room Air


     14:15                           (93)








Intake and Output





3/16/19


3/16/19


3/17/19





1515:00


23:00


07:00





IntakeIntake Total


1080 ml


600 ml





OutputOutput Total


1400 ml


845 ml





BalanceBalance


-320 ml


-245 ml











Exam


GENERAL:  Well-nourished, well-developed gentleman lying in bed no acute distr


ess


HEENT:  Moist mucous membranes.  NG tube in place. 


CARDIAC:  S1, S2, no added sounds or murmurs.


CHEST:  Clear to auscultation bilaterally


ABDOMEN:  Soft, nondistended. Nontender to mild palpation. R CHA drain with some 


output, dressing c/d/i. 


EXTREMITIES:  No cyanosis, clubbing.





Results


Results 24hrs





Laboratory Tests


Test
                 3/16/19
17:42  3/16/19
17:43  3/16/19
21:49  3/17/19
01:13


Bedside Glucose               69  L           74             84             97


Test
                 3/17/19
04:20  3/17/19
05:16  3/17/19
09:33  3/17/19
11:59


White Blood Count             8.0


Red Blood Count             4.05  L


Hemoglobin                  10.1  L


Hematocrit                  33.1  L


Mean Corpuscular            81.7  L


Volume


Mean Corpuscular            24.9  L


Hemoglobin


Mean Corpuscular           30.5  L
  
              
              



Hemoglobin
Concent


Red Cell                    16.8  H


Distribution Width


Platelet Count               354  #


Mean Platelet Volume          9.7


Immature                   0.700  H


Granulocytes %


Neutrophils %                65.4


Lymphocytes %                25.9


Monocytes %                   5.6


Eosinophils %                 2.2


Basophils %                   0.2


Nucleated Red Blood           0.0


Cells %


Immature                   0.060  H


Granulocytes #


Neutrophils #                 5.2


Lymphocytes #                 2.1


Monocytes #                   0.5


Eosinophils #                 0.2


Basophils #                   0.0


Nucleated Red Blood           0.0


Cells #


Sodium Level                  137


Potassium Level               4.3


Chloride Level                104


Carbon Dioxide Level          19  L


Anion Gap                     14  H


Blood Urea Nitrogen            11


Creatinine                   0.91


Est Glomerular        > 60  
        
              
              



Filtrat Rate
mL/min


Glucose Level                 103


Calcium Level                 9.1


Phosphorus Level              4.1


Magnesium Level              1.6  L


Total Bilirubin              0.1  L


Direct Bilirubin             0.00


Indirect Bilirubin            0.1


Aspartate Amino              14  L
  
              
              



Transf
(AST/SGOT)


Alanine                      10  L
  
              
              



Aminotransferase
(AL


T/SGPT)


Alkaline Phosphatase           76


Total Protein                 6.6


Albumin                       3.3


Globulin                    3.30  H


Albumin/Globulin             1.00


Ratio


Bedside Glucose                              104            134            122








Medications


Medication





Current Medications


IV Flush (NS 3 ml) 3 ml PER PROTOCOL IV ;  Start 3/2/19 at 00:00


Zolpidem Tartrate (Ambien) 10 mg QHS  PRN PO .INSOMNIA Last administered on 


3/12/19at 01:14; Admin Dose 10 MG;  Start 3/2/19 at 00:00


Albuterol/ Ipratropium (Duoneb) 3 ml Q2H RESP THERAPY  PRN HHN SHORTNESS OF 


BREATH;  Start 3/2/19 at 00:00


Miscellaneous Information 1 ea NOTE XX ;  Start 3/2/19 at 00:00


Glucose (Glutose) 15 gm Q15M  PRN PO DECREASED GLUCOSE;  Start 3/2/19 at 00:00


Glucose (Glutose) 22.5 gm Q15M  PRN PO DECREASED GLUCOSE;  Start 3/2/19 at 00:00


Dextrose (D50w Syringe) 25 ml Q15M  PRN IV DECREASED GLUCOSE;  Start 3/2/19 at 


00:00


Dextrose (D50w Syringe) 50 ml Q15M  PRN IV DECREASED GLUCOSE;  Start 3/2/19 at 


00:00


Glucagon (Glucagen) 1 mg Q15M  PRN IM DECREASED GLUCOSE;  Start 3/2/19 at 00:00


Glucose (Glutose) 15 gm Q15M  PRN BUCCAL DECREASED GLUCOSE;  Start 3/2/19 at 


00:00


Insulin Aspart (Novolog Insulin Pen) 8 unit WITH  MEALS SC  Last administered on


3/11/19at 17:58; Admin Dose 8 UNIT;  Start 3/9/19 at 18:00;  Status Hold


Hydromorphone HCl (Dilaudid) 1 mg Q4H  PRN IV SEVERE PAIN LEVEL 7-10 Last 


administered on 3/17/19at 13:13; Admin Dose 1 MG;  Start 3/11/19 at 17:00


Insulin Aspart (Novolog Insulin Pen) NOVOLOG *MODERATE* ALGORITHM Q4 SC  Last 


administered on 3/13/19at 17:02; Admin Dose 4 UNIT;  Start 3/12/19 at 09:00


Sodium Chloride 1,000 ml @  50 mls/hr Q20H IV  Last administered on 3/16/19at 


17:32; Admin Dose 50 MLS/HR;  Start 3/12/19 at 10:00


Morphine Sulfate (morphine) 2 mg Q2H  PRN IV breakthrough pain;  Start 3/12/19 


at 18:30


Acetaminophen/ Hydrocodone Bitart (Norco (5/325)) 1 tab Q6H  PRN PO PAIN LEVEL 


6-10;  Start 3/12/19 at 18:30


Acetaminophen (Tylenol Tab) 650 mg Q6H  PRN PO MILD PAIN(1-3)OR ELEVATED TEMP;  


Start 3/12/19 at 18:30


Ibuprofen (Motrin) 600 mg Q6H  PRN PO PAIN LEVEL 1-5;  Start 3/12/19 at 18:30


Ondansetron HCl (Zofran Inj) 4 mg Q6H  PRN IV NAUSEA AND/OR VOMITING Last 


administered on 3/17/19at 12:38; Admin Dose 4 MG;  Start 3/12/19 at 18:30


Miscellaneous Information (* Miscellaneous Pharmacy Order) DURAMORPH: 5 MG 


EPIDU... GIVEN NEURAXIAL XX ;  Start 3/12/19 at 21:00


Phenol (Chloraseptic Throat Spray) 2 spray Q2H  PRN MT SORE THROAT Last 


administered on 3/16/19at 21:48; Admin Dose 2 SPRAY;  Start 3/13/19 at 17:30


Hydromorphone HCl (Dilaudid) 0.4 mg Q10MIN  PRN IV SEVERE PAIN LEVEL 7-10 Last 


administered on 3/15/19at 00:06; Admin Dose 0.4 MG;  Start 3/14/19 at 08:30


Naloxone HCl (Narcan) 0.2 mg Q2M  PRN IV .RESP RATE;  Start 3/14/19 at 10:30


Fentanyl/ Ropivacaine 100 ml CONT EPIDURAL EPI  Last administered on 3/17/19at 


13:45; Admin Dose 100 ML;  Start 3/15/19 at 10:15


Zolpidem Tartrate (Ambien) 5 mg HS  PRN PO INSOMNIA Last administered on 


3/16/19at 22:03; Admin Dose 5 MG;  Start 3/15/19 at 16:00


Insulin Glargine (Lantus) 15 units DAILY@0800 SC ;  Start 3/17/19 at 08:00;  


Status Hold











SHERINE LOCKETT MD              Mar 17, 2019 14:23

## 2019-03-17 NOTE — PN
Date/Time of Note


Date/Time of Note


DATE: 3/16/19 


TIME: 22:23





Assessment/Plan


Lines/Catheters


IV Catheter Type (from Nrs):  Peripheral IV


Amaya in Place (from Nrs):  No





Assessment/Plan


Chief Complaint/Hosp Course


1.  Transverse colon adenocarcinoma,  Previous small bowel resection with 


primary anastomosis (antecolic over the cancer) and gastrojejunostomy; Ventral 


incisional Swiss cheese type hernia: s/p   Laparoscopic converted to open 


transverse colectomy, Revision of previous scar, Primary repair of ventral 


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 


ileus.


-IS


-oob>ambulate qid


-chew gum


-ice pack to abdominal wall


-continue npo and ngt to liws until bowel function


-incision site local care


-await path


-adrien drain


-pain control


2.  40 pound weight loss secondary to above


3.  Diabetes mellitus type 2


-Nutrition medication optimization


4.  Hypertension


-Nutrition medication optimization


5.  Coronary artery disease with history of Plavix and aspirin


-Cardiac evaluation and optimization


6.  Iron deficiency anemia secondary to above


-Iron transfusion per hematology


-As above





Thank you





Late entry 3/16





Subjective


24 Hr Interval Summary


No bowel fxn as of yet.  NGT output significant. No fevers, chills, sob, 


congested cough, cp, palpitations, ha, dizziness, nausea, vomiting, dysuria.  


Labs noted.





Exam/Review of Systems


Vital Signs


Vitals





Vital Signs


  Date      Temp  Pulse  Resp  B/P (MAP)   Pulse Ox  O2          O2 Flow    FiO2


Time                                                 Delivery    Rate


   3/17/19  98.2     90    18      116/58        96


     19:48                           (77)


   3/17/19                                           Room Air


     14:15








Intake and Output





3/16/19


3/16/19


3/17/19





1515:00


23:00


07:00





IntakeIntake Total


1080 ml


600 ml





OutputOutput Total


1400 ml


845 ml





BalanceBalance


-320 ml


-245 ml














Exam


Free Text/Dictation


Constitutional:  alert, oriented; 


   No distress


Psych:  nl mood/affect; 


   No anxiety


Head:  normocephalic, atraumatic


Eyes:  nl conjunctiva, EOMI, PERRL; 


   No icteric


ENMT:  nl external ears & nose, mucosa pink and moist, ngt


Neck:  non-tender; 


   No jvd


Respiratory:  normal air movement; 


   No congested cough, No labored breathing


Cardiovascular:  regular rate and rhythm; 


   No edema


Gastrointestinal:  soft, tender (tanner-incisional: midline staple line: dry, no 


erythema, adrien serous)


   No distended, No rebound or guarding


Genitourinary - Male:  nl scrotum, amaya


Musculoskeletal:  nl extremities to inspection, nl gait and stance; 


   No joint tenderness


Extremities:  normal pulses; 


   No calf tenderness, No edema


Neurological:  nl mental status, nl speech, nl strength


Skin:  nl turgor; 


   No rash or lesions


Lymph:  nl lymph nodes





Results


Result Diagram:  


3/17/19 0420                                                                    


           3/17/19 0420














LINDA MACE MD              Mar 17, 2019 22:29

## 2019-03-18 VITALS — DIASTOLIC BLOOD PRESSURE: 73 MMHG | SYSTOLIC BLOOD PRESSURE: 121 MMHG | RESPIRATION RATE: 19 BRPM | HEART RATE: 91 BPM

## 2019-03-18 VITALS — RESPIRATION RATE: 18 BRPM | SYSTOLIC BLOOD PRESSURE: 115 MMHG | DIASTOLIC BLOOD PRESSURE: 67 MMHG

## 2019-03-18 VITALS — HEART RATE: 91 BPM | DIASTOLIC BLOOD PRESSURE: 74 MMHG | SYSTOLIC BLOOD PRESSURE: 126 MMHG | RESPIRATION RATE: 18 BRPM

## 2019-03-18 VITALS — SYSTOLIC BLOOD PRESSURE: 123 MMHG | DIASTOLIC BLOOD PRESSURE: 68 MMHG | HEART RATE: 82 BPM | RESPIRATION RATE: 19 BRPM

## 2019-03-18 VITALS — SYSTOLIC BLOOD PRESSURE: 117 MMHG | HEART RATE: 82 BPM | DIASTOLIC BLOOD PRESSURE: 69 MMHG | RESPIRATION RATE: 18 BRPM

## 2019-03-18 LAB
ADD MAN DIFF?: NO
BASOPHIL #: 0 10^3/UL (ref 0–0.1)
BASOPHILS %: 0.3 % (ref 0–2)
EOSINOPHILS #: 0.2 10^3/UL (ref 0–0.5)
EOSINOPHILS %: 1.8 % (ref 0–7)
HEMATOCRIT: 33.1 % (ref 42–52)
HEMOGLOBIN: 10.2 G/DL (ref 14–18)
IMMATURE GRANS #M: 0.05 10^3/UL (ref 0–0.03)
IMMATURE GRANS % (M): 0.6 % (ref 0–0.43)
LYMPHOCYTES #: 1.7 10^3/UL (ref 0.8–2.9)
LYMPHOCYTES %: 18.6 % (ref 15–51)
MEAN CORPUSCULAR HEMOGLOBIN: 24.9 PG (ref 29–33)
MEAN CORPUSCULAR HGB CONC: 30.8 G/DL (ref 32–37)
MEAN CORPUSCULAR VOLUME: 80.7 FL (ref 82–101)
MEAN PLATELET VOLUME: 9.6 FL (ref 7.4–10.4)
MONOCYTE #: 0.6 10^3/UL (ref 0.3–0.9)
MONOCYTES %: 7.2 % (ref 0–11)
NEUTROPHIL #: 6.4 10^3/UL (ref 1.6–7.5)
NEUTROPHILS %: 71.5 % (ref 39–77)
NUCLEATED RED BLOOD CELLS #: 0 10^3/UL (ref 0–0)
NUCLEATED RED BLOOD CELLS%: 0 /100WBC (ref 0–0)
PLATELET COUNT: 364 10^3/UL (ref 140–415)
RED BLOOD COUNT: 4.1 10^6/UL (ref 4.7–6.1)
RED CELL DISTRIBUTION WIDTH: 16.7 % (ref 11.5–14.5)
WHITE BLOOD COUNT: 8.9 10^3/UL (ref 4.8–10.8)

## 2019-03-18 RX ADMIN — HYDROMORPHONE HYDROCHLORIDE PRN MG: 1 INJECTION, SOLUTION INTRAMUSCULAR; INTRAVENOUS; SUBCUTANEOUS at 03:12

## 2019-03-18 RX ADMIN — HYDROMORPHONE HYDROCHLORIDE 1 MG: 1 INJECTION, SOLUTION INTRAMUSCULAR; INTRAVENOUS; SUBCUTANEOUS at 07:19

## 2019-03-18 RX ADMIN — HYDROMORPHONE HYDROCHLORIDE 1 MG: 1 INJECTION, SOLUTION INTRAMUSCULAR; INTRAVENOUS; SUBCUTANEOUS at 10:56

## 2019-03-18 RX ADMIN — HYDROMORPHONE HYDROCHLORIDE PRN MG: 1 INJECTION, SOLUTION INTRAMUSCULAR; INTRAVENOUS; SUBCUTANEOUS at 10:56

## 2019-03-18 RX ADMIN — MORPHINE SULFATE 1 MG: 2 INJECTION, SOLUTION INTRAMUSCULAR; INTRAVENOUS at 15:56

## 2019-03-18 RX ADMIN — INSULIN ASPART 1 UNIT: 100 INJECTION, SOLUTION INTRAVENOUS; SUBCUTANEOUS at 17:53

## 2019-03-18 RX ADMIN — HYDROMORPHONE HYDROCHLORIDE 1 MG: 1 INJECTION, SOLUTION INTRAMUSCULAR; INTRAVENOUS; SUBCUTANEOUS at 03:12

## 2019-03-18 RX ADMIN — FENTANYL CIT 0.2 MG/100ML-ROPIV 0.2%-NACL 0.9% EPIDURAL INJ 1 ML: 2/0.2 SOLUTION at 05:04

## 2019-03-18 RX ADMIN — KETOROLAC TROMETHAMINE 1 MG: 15 INJECTION, SOLUTION INTRAMUSCULAR; INTRAVENOUS at 17:54

## 2019-03-18 RX ADMIN — FENTANYL CIT 0.2 MG/100ML-ROPIV 0.2%-NACL 0.9% EPIDURAL INJ SCH ML: 2/0.2 SOLUTION at 05:04

## 2019-03-18 RX ADMIN — DEXAMETHASONE SODIUM PHOSPHATE PRN MG: 10 INJECTION, SOLUTION INTRAMUSCULAR; INTRAVENOUS at 06:49

## 2019-03-18 RX ADMIN — ONDANSETRON HYDROCHLORIDE 1 MG: 2 INJECTION, SOLUTION INTRAMUSCULAR; INTRAVENOUS at 06:49

## 2019-03-18 RX ADMIN — MORPHINE SULFATE 1 MG: 2 INJECTION, SOLUTION INTRAMUSCULAR; INTRAVENOUS at 21:20

## 2019-03-18 RX ADMIN — INSULIN ASPART 1 UNIT: 100 INJECTION, SOLUTION INTRAVENOUS; SUBCUTANEOUS at 01:00

## 2019-03-18 RX ADMIN — MORPHINE SULFATE PRN MG: 2 INJECTION, SOLUTION INTRAMUSCULAR; INTRAVENOUS at 21:20

## 2019-03-18 RX ADMIN — INSULIN ASPART 1 UNIT: 100 INJECTION, SOLUTION INTRAVENOUS; SUBCUTANEOUS at 09:00

## 2019-03-18 RX ADMIN — INSULIN ASPART 1 UNIT: 100 INJECTION, SOLUTION INTRAVENOUS; SUBCUTANEOUS at 13:03

## 2019-03-18 RX ADMIN — KETOROLAC TROMETHAMINE PRN MG: 15 INJECTION, SOLUTION INTRAMUSCULAR; INTRAVENOUS at 17:54

## 2019-03-18 RX ADMIN — INSULIN ASPART 1 UNIT: 100 INJECTION, SOLUTION INTRAVENOUS; SUBCUTANEOUS at 21:00

## 2019-03-18 RX ADMIN — MORPHINE SULFATE PRN MG: 2 INJECTION, SOLUTION INTRAMUSCULAR; INTRAVENOUS at 15:56

## 2019-03-18 RX ADMIN — INSULIN ASPART 1 UNIT: 100 INJECTION, SOLUTION INTRAVENOUS; SUBCUTANEOUS at 05:00

## 2019-03-18 RX ADMIN — HYDROMORPHONE HYDROCHLORIDE PRN MG: 1 INJECTION, SOLUTION INTRAMUSCULAR; INTRAVENOUS; SUBCUTANEOUS at 07:19

## 2019-03-18 NOTE — PN
Date/Time of Note


Date/Time of Note


DATE: 3/18/19 


TIME: 15:30





Assessment/Plan


VTE Prophylaxis


Risk score (from Ns)>0 risk:  6


SCD applied (from Ns):  Yes


Pharmacological prophylaxis:  heparin





Lines/Catheters


IV Catheter Type (from Nrs):  Peripheral IV


Urinary Cath still in place:  No





Assessment/Plan


Hospital Course


57 yo male with DM who presented with hematochezia.  Workup has revealed colon 


adenocarcinoma.  No w s/p open hemicolectomy POD 2





Colon adenocarcinoma:


- Bone scan negative for mets


- s/p colonoscopy with path showing adenocarcinoma


- Dr Mcintyre following





s/p colectomy


- Hemicolectomy performed 3/12.  Periop management per surgery


- Advanced diet to clears





Iron deficiency anemia:


- IV iron





Diabetes


- Basal/bolus insulin





CKD II:


- stable





Prophylaxis: SCDs





DC planning: Undomiciled currently, CM assistance


Result Diagram:  


3/18/19 0426                                                                    


           3/17/19 0420





Results 24hrs





Laboratory Tests


Test
                 3/17/19
18:46  3/17/19
18:48  3/17/19
21:09  3/18/19
01:24


Bedside Glucose               146            160            156            131


Test
                 3/18/19
04:26  3/18/19
04:59  3/18/19
09:05  3/18/19
12:59


White Blood Count             8.9


Red Blood Count             4.10  L


Hemoglobin                  10.2  L


Hematocrit                  33.1  L


Mean Corpuscular            80.7  L


Volume


Mean Corpuscular            24.9  L


Hemoglobin


Mean Corpuscular           30.8  L
  
              
              



Hemoglobin
Concent


Red Cell                    16.7  H


Distribution Width


Platelet Count                364


Mean Platelet Volume          9.6


Immature                   0.600  H


Granulocytes %


Neutrophils %                71.5


Lymphocytes %                18.6


Monocytes %                   7.2


Eosinophils %                 1.8


Basophils %                   0.3


Nucleated Red Blood           0.0


Cells %


Immature                   0.050  H


Granulocytes #


Neutrophils #                 6.4


Lymphocytes #                 1.7


Monocytes #                   0.6


Eosinophils #                 0.2


Basophils #                   0.0


Nucleated Red Blood           0.0


Cells #


Bedside Glucose                              129            190            169








Subjective


24 Hr Interval Summary


Free Text/Dictation


Epidural removed


Bowel function restored, NG tube removed


Clear diet





Exam/Review of Systems


Exam


Vitals





Vital Signs


  Date      Temp  Pulse  Resp  B/P (MAP)   Pulse Ox  O2          O2 Flow    FiO2


Time                                                 Delivery    Rate


   3/18/19  98.1     82    19      123/68        98  Room Air


     15:13                           (86)








Intake and Output





3/17/19


3/17/19


3/18/19





1515:00


23:00


07:00





IntakeIntake Total


200 ml


460 ml





OutputOutput Total


430 ml


930 ml


720 ml





BalanceBalance


-430 ml


-730 ml


-260 ml











Constitutional:  alert, oriented, well developed


Psych:  no complaints, nl mood/affect


Head:  normocephalic, atraumatic


Eyes:  nl conjunctiva, EOMI, nl lids, nl sclera, PERRL


ENMT:  nl external ears & nose, nl lips & teeth, nl nasal mucosa & septum


Neck:  supple, non-tender


Respiratory:  clear to auscultation, normal air movement


Cardiovascular:  regular rate and rhythm, nl pulses


Gastrointestinal:  soft, nl liver, spleen, non-tender


Musculoskeletal:  nl extremities to inspection, nl gait and stance


Extremities:  normal pulses


Neurological:  CNS II-XII intact, nl mental status, nl speech, nl strength


Skin:  nl turgor; 


   No rash or lesions


Lymph:  nl lymph nodes





Results


Results 24hrs





Laboratory Tests


Test
                 3/17/19
18:46  3/17/19
18:48  3/17/19
21:09  3/18/19
01:24


Bedside Glucose               146            160            156            131


Test
                 3/18/19
04:26  3/18/19
04:59  3/18/19
09:05  3/18/19
12:59


White Blood Count             8.9


Red Blood Count             4.10  L


Hemoglobin                  10.2  L


Hematocrit                  33.1  L


Mean Corpuscular            80.7  L


Volume


Mean Corpuscular            24.9  L


Hemoglobin


Mean Corpuscular           30.8  L
  
              
              



Hemoglobin
Concent


Red Cell                    16.7  H


Distribution Width


Platelet Count                364


Mean Platelet Volume          9.6


Immature                   0.600  H


Granulocytes %


Neutrophils %                71.5


Lymphocytes %                18.6


Monocytes %                   7.2


Eosinophils %                 1.8


Basophils %                   0.3


Nucleated Red Blood           0.0


Cells %


Immature                   0.050  H


Granulocytes #


Neutrophils #                 6.4


Lymphocytes #                 1.7


Monocytes #                   0.6


Eosinophils #                 0.2


Basophils #                   0.0


Nucleated Red Blood           0.0


Cells #


Bedside Glucose                              129            190            169








Medications


Medication





Current Medications


IV Flush (NS 3 ml) 3 ml PER PROTOCOL IV ;  Start 3/2/19 at 00:00


Zolpidem Tartrate (Ambien) 10 mg QHS  PRN PO .INSOMNIA Last administered on 


3/17/19at 23:21; Admin Dose 10 MG;  Start 3/2/19 at 00:00


Albuterol/ Ipratropium (Duoneb) 3 ml Q2H RESP THERAPY  PRN HHN SHORTNESS OF 


BREATH;  Start 3/2/19 at 00:00


Miscellaneous Information 1 ea NOTE XX ;  Start 3/2/19 at 00:00


Glucose (Glutose) 15 gm Q15M  PRN PO DECREASED GLUCOSE;  Start 3/2/19 at 00:00


Glucose (Glutose) 22.5 gm Q15M  PRN PO DECREASED GLUCOSE;  Start 3/2/19 at 00:00


Dextrose (D50w Syringe) 25 ml Q15M  PRN IV DECREASED GLUCOSE;  Start 3/2/19 at 


00:00


Dextrose (D50w Syringe) 50 ml Q15M  PRN IV DECREASED GLUCOSE;  Start 3/2/19 at 


00:00


Glucagon (Glucagen) 1 mg Q15M  PRN IM DECREASED GLUCOSE;  Start 3/2/19 at 00:00


Glucose (Glutose) 15 gm Q15M  PRN BUCCAL DECREASED GLUCOSE;  Start 3/2/19 at 


00:00


Insulin Aspart (Novolog Insulin Pen) 8 unit WITH  MEALS SC  Last administered on


3/11/19at 17:58; Admin Dose 8 UNIT;  Start 3/9/19 at 18:00;  Status Hold


Hydromorphone HCl (Dilaudid) 1 mg Q4H  PRN IV SEVERE PAIN LEVEL 7-10 Last 


administered on 3/18/19at 10:56; Admin Dose 1 MG;  Start 3/11/19 at 17:00


Sodium Chloride 1,000 ml @  50 mls/hr Q20H IV  Last administered on 3/17/19at 


21:14; Admin Dose 50 MLS/HR;  Start 3/12/19 at 10:00


Morphine Sulfate (morphine) 2 mg Q2H  PRN IV breakthrough pain;  Start 3/12/19 


at 18:30


Acetaminophen/ Hydrocodone Bitart (Norco (5/325)) 1 tab Q6H  PRN PO PAIN LEVEL 


6-10;  Start 3/12/19 at 18:30


Acetaminophen (Tylenol Tab) 650 mg Q6H  PRN PO MILD PAIN(1-3)OR ELEVATED TEMP;  


Start 3/12/19 at 18:30


Ibuprofen (Motrin) 600 mg Q6H  PRN PO PAIN LEVEL 1-5;  Start 3/12/19 at 18:30


Ondansetron HCl (Zofran Inj) 4 mg Q6H  PRN IV NAUSEA AND/OR VOMITING Last 


administered on 3/18/19at 06:49; Admin Dose 4 MG;  Start 3/12/19 at 18:30


Miscellaneous Information (* Miscellaneous Pharmacy Order) DURAMORPH: 5 MG 


EPIDU... GIVEN NEURAXIAL XX ;  Start 3/12/19 at 21:00


Phenol (Chloraseptic Throat Spray) 2 spray Q2H  PRN MT SORE THROAT Last 


administered on 3/16/19at 21:48; Admin Dose 2 SPRAY;  Start 3/13/19 at 17:30


Hydromorphone HCl (Dilaudid) 0.4 mg Q10MIN  PRN IV SEVERE PAIN LEVEL 7-10 Last 


administered on 3/15/19at 00:06; Admin Dose 0.4 MG;  Start 3/14/19 at 08:30


Naloxone HCl (Narcan) 0.2 mg Q2M  PRN IV .RESP RATE;  Start 3/14/19 at 10:30


Fentanyl/ Ropivacaine 100 ml CONT EPIDURAL EPI  Last administered on 3/18/19at 


05:04; Admin Dose 100 ML;  Start 3/15/19 at 10:15


Zolpidem Tartrate (Ambien) 5 mg HS  PRN PO INSOMNIA Last administered on 


3/16/19at 22:03; Admin Dose 5 MG;  Start 3/15/19 at 16:00


Insulin Glargine (Lantus) 15 units DAILY@0800 SC ;  Start 3/17/19 at 08:00;  


Status Hold


Insulin Aspart (Novolog Insulin Pen) NOVOLOG *MODERATE* ALGORITHM AC MEALS AND  


BEDTIME SC  Last administered on 3/18/19at 13:03; Admin Dose 1 UNIT;  Start 


3/18/19 at 12:30











HANNAH DOWNING MD          Mar 18, 2019 15:31

## 2019-03-18 NOTE — PN
Date/Time of Note


Date/Time of Note


DATE: 3/18/19 


TIME: 12:20





Assessment/Plan


Lines/Catheters


IV Catheter Type (from [facility]):  Peripheral IV


Amaya in Place (from [facility]):  No





Assessment/Plan


Chief Complaint/Hosp Course


1.  Transverse colon adenocarcinoma,  Previous small bowel resection with 


primary anastomosis (antecolic over the cancer) and gastrojejunostomy; Ventral 


incisional Swiss cheese type hernia: s/p   Laparoscopic converted to open 


transverse colectomy, Revision of previous scar, Primary repair of ventral 


incisional Swiss cheese type hernia 3/12/19; +Bowel function.  Ileus resolved.  


Path noted > Oncology


-IS


-oob>ambulate qid


-chew gum


-ice pack to abdominal wall


-d/c ngt, adrien


-incision site local care


-clear liquids


-oncology


-pain control


2.  40 pound weight loss secondary to above


3.  Diabetes mellitus type 2


-Nutrition medication optimization


4.  Hypertension


-Nutrition medication optimization


5.  Coronary artery disease with history of Plavix and aspirin


-Cardiac evaluation and optimization


6.  Iron deficiency anemia secondary to above


-Iron transfusion per hematology


-As above





Thank you





Subjective


24 Hr Interval Summary


Flatus and BM yesterday after SBFT.  Negative SBFT.  ADRIEN 80 serous.  No n/v.  NGT


output 900 bilious, however, he has gastrojejunostomy. No fevers, chills, sob, 


congested cough, cp, palpitations, ha, dizziness, vomiting, dysuria.  Labs 


noted.





Exam/Review of Systems


Vital Signs


Vitals





Vital Signs


  Date      Temp  Pulse  Resp  B/P (MAP)   Pulse Ox  O2          O2 Flow    FiO2


Time                                                 Delivery    Rate


   3/18/19  98.1     91    18      126/74        96


     08:23                           (91)


   3/17/19                                           Room Air


     14:15








Intake and Output





3/17/19


3/17/19


3/18/19





1515:00


23:00


07:00





IntakeIntake Total


200 ml


460 ml





OutputOutput Total


430 ml


930 ml


720 ml





BalanceBalance


-430 ml


-730 ml


-260 ml














Exam


Free Text/Dictation


Constitutional:  alert, oriented; 


   No distress


Psych:  nl mood/affect; 


   No anxiety


Head:  normocephalic, atraumatic


Eyes:  nl conjunctiva, EOMI, PERRL; 


   No icteric


ENMT:  nl external ears & nose, mucosa pink and moist, ngt


Neck:  non-tender; 


   No jvd


Respiratory:  normal air movement; 


   No congested cough, No labored breathing


Cardiovascular:  regular rate and rhythm; 


   No edema


Gastrointestinal:  soft, min tender (tanner-incisional: midline staple line: dry, 


no erythema, adrien serous)


   No distended, No rebound or guarding


Genitourinary - Male:  nl scrotum, amaya


Musculoskeletal:  nl extremities to inspection, nl gait and stance; 


   No joint tenderness


Extremities:  normal pulses; 


   No calf tenderness, No edema


Neurological:  nl mental status, nl speech, nl strength


Skin:  nl turgor; 


   No rash or lesions


Lymph:  nl lymph nodes





Results


Free Text/Dictation


Pathology:





A-Midline scar, excision:


-- Skin with dermal fibrous scar.


-- No malignancy or atypia is identified. 


 


B-Transverse colon, transverse colectomy:


-- Moderately-differentiated adenocarcinoma with mucinous differentiation, 


invades through the 


muscularis propria, focally into pericolonic tissue associated with marked 


luminal stenosis. 


-- Mesenteric lymph nodes with no evidence of metastatic adenocarcinoma (0/18). 


-- Margins are clear of adenocarcinoma. 


 


SURGICAL PATHOLOGY CANCER CASE SUMMARY: 


 


PROCEDURE:  Transverse colectomy.


 


TUMOR SITE:  Transverse colon. 


 


TUMOR SIZE:   11.0 x 6.0 x 1.5 cm.


 


MACROSCOPIC TUMOR PERFORATION:  Not identified. 


 


HISTOLOGIC TYPE:   Adenocarcinoma with mucinous differentiation.


 


HISTOLOGIC GRADE:  Moderately-differentiated.


 


TUMOR EXTENSION:  Tumor invades through the muscularis propria, focally into 


pericolonic tissue. 


 


MARGINS:  All margins are uninvolved by invasive carcinoma.


 


***** Proximal margin: Clear by 8.5 cm.


***** Distal margin:  Clear by 15.5 cm.


***** Mesenteric margin:  Clear by 1.5 cm.


 


TREATMENT EFFECT:  No known presurgical therapy.


 


LYMPH-VASCULAR INVASION:  Not identified.


 


Continued Next Page. . . 


 


PERINEURAL INVASION:  Not identified.


 


TUMOR BUDDING:  Low score.


 


TYPE OF POLYP IN WHICH INVASIVE CARCINOMA AROSE:  None identified.  


 


TUMOR DEPOSIT:  Not identified. 


 


LYMPH NODES: 


 


***** Number of lymph nodes involved:  0.


***** Number of lymph nodes examined:  18.


 


PATHOLOGIC STAGE CLASSIFICATION (pTNM, AJCC 8TH EDITION):  pT3 pN0.


Result Diagram:  


3/18/19 0426                                                                    


           3/17/19 0420














LINDA MACE MD              Mar 18, 2019 12:27

## 2019-03-18 NOTE — CONS
Assessment/Plan


Assessment/Plan


Hospital Course (Demo Recall)


IMPRESSION:


1.  Preoperative evaluation in a patient prior to colectomy that has multiple 


cardiac risk factors and possible prior stent apparently placed approximately 2 


to 3 years prior.-neg trop x 2. Now Post-op s/p surgery with clean margins by 


path


2.  Hypotension, borderline.-ongoing today


3.  History of possible prior stent placements 2 to 3 years prior per chart 


biopsy.


4.  Dyslipidemia.


5.  Adenocarcinoma of the colon, newly diagnosed.


6.  Abdominal pain secondary to adenocarcinoma of the colon.


7.  Diabetes mellitus.


8.  Anemia.


9.  Renal failure insufficiency.





Recc:


-Follow BP/volume status clsoely


-routine post-op care


-pain control


-Follow BS


-Oncology following





Consultation Date/Type/Reason


Admit Date/Time


Mar 1, 2019 at 20:38


Initial Consult Date


3/10/19


Type of Consult


Cardiology


Reason for Consultation


preop


Requesting Provider:  HANNAH DOWNING MD


Date/Time of Note


DATE: 3/18/19 


TIME: 14:39





Exam/Review of Systems


Vital Signs


Vitals





Vital Signs


  Date      Temp  Pulse  Resp  B/P (MAP)   Pulse Ox  O2          O2 Flow    FiO2


Time                                                 Delivery    Rate


   3/18/19  98.1     91    18      126/74        96


     08:23                           (91)


   3/17/19                                           Room Air


     14:15








Intake and Output





3/17/19


3/17/19


3/18/19





1414:59


22:59


06:59





IntakeIntake Total


200 ml


460 ml





OutputOutput Total


430 ml


930 ml


720 ml





BalanceBalance


-430 ml


-730 ml


-260 ml














Exam


Exam


Review of Systems:


CONSTITUTIONAL:  No fevers, chills.


PULMONARY:  No sob


CARDIOVASCULAR: Mild abd pain


GENITOURINARY:  No hematuria/dysuria.


MUSCULOSKELETAL:  No myagias/arthalgias.


PSYCHIATRIC:  The patient denies depression.


NEUROLOGIC:   No weakness


Constitutional:  alert, oriented


Psych:  no complaints


Head:  normocephalic


Neck:  supple, jvd (9 cm water)


Respiratory:  diminished breath sounds (at bases/B)


Cardiovascular:  regular rate and rhythm


Gastrointestinal:  soft, tender (mild to palpation)


Musculoskeletal:  muscle tone (NL)


Extremities:  edema (none)





Labs


Result Diagram:  


3/18/19 0426                                                                    


           3/17/19 0420





Results 24hrs





Laboratory Tests


Test
                 3/17/19
18:46  3/17/19
18:48  3/17/19
21:09  3/18/19
01:24


Bedside Glucose               146            160            156            131


Test
                 3/18/19
04:26  3/18/19
04:59  3/18/19
09:05  3/18/19
12:59


White Blood Count             8.9


Red Blood Count             4.10  L


Hemoglobin                  10.2  L


Hematocrit                  33.1  L


Mean Corpuscular            80.7  L


Volume


Mean Corpuscular            24.9  L


Hemoglobin


Mean Corpuscular           30.8  L
  
              
              



Hemoglobin
Concent


Red Cell                    16.7  H


Distribution Width


Platelet Count                364


Mean Platelet Volume          9.6


Immature                   0.600  H


Granulocytes %


Neutrophils %                71.5


Lymphocytes %                18.6


Monocytes %                   7.2


Eosinophils %                 1.8


Basophils %                   0.3


Nucleated Red Blood           0.0


Cells %


Immature                   0.050  H


Granulocytes #


Neutrophils #                 6.4


Lymphocytes #                 1.7


Monocytes #                   0.6


Eosinophils #                 0.2


Basophils #                   0.0


Nucleated Red Blood           0.0


Cells #


Bedside Glucose                              129            190            169








Medications


Medications





Current Medications


IV Flush (NS 3 ml) 3 ml PER PROTOCOL IV ;  Start 3/2/19 at 00:00


Zolpidem Tartrate (Ambien) 10 mg QHS  PRN PO .INSOMNIA Last administered on 


3/17/19at 23:21; Admin Dose 10 MG;  Start 3/2/19 at 00:00


Albuterol/ Ipratropium (Duoneb) 3 ml Q2H RESP THERAPY  PRN HHN SHORTNESS OF 


BREATH;  Start 3/2/19 at 00:00


Miscellaneous Information 1 ea NOTE XX ;  Start 3/2/19 at 00:00


Glucose (Glutose) 15 gm Q15M  PRN PO DECREASED GLUCOSE;  Start 3/2/19 at 00:00


Glucose (Glutose) 22.5 gm Q15M  PRN PO DECREASED GLUCOSE;  Start 3/2/19 at 00:00


Dextrose (D50w Syringe) 25 ml Q15M  PRN IV DECREASED GLUCOSE;  Start 3/2/19 at 


00:00


Dextrose (D50w Syringe) 50 ml Q15M  PRN IV DECREASED GLUCOSE;  Start 3/2/19 at 


00:00


Glucagon (Glucagen) 1 mg Q15M  PRN IM DECREASED GLUCOSE;  Start 3/2/19 at 00:00


Glucose (Glutose) 15 gm Q15M  PRN BUCCAL DECREASED GLUCOSE;  Start 3/2/19 at 


00:00


Insulin Aspart (Novolog Insulin Pen) 8 unit WITH  MEALS SC  Last administered on


3/11/19at 17:58; Admin Dose 8 UNIT;  Start 3/9/19 at 18:00;  Status Hold


Hydromorphone HCl (Dilaudid) 1 mg Q4H  PRN IV SEVERE PAIN LEVEL 7-10 Last 


administered on 3/18/19at 10:56; Admin Dose 1 MG;  Start 3/11/19 at 17:00


Sodium Chloride 1,000 ml @  50 mls/hr Q20H IV  Last administered on 3/17/19at 


21:14; Admin Dose 50 MLS/HR;  Start 3/12/19 at 10:00


Morphine Sulfate (morphine) 2 mg Q2H  PRN IV breakthrough pain;  Start 3/12/19 


at 18:30


Acetaminophen/ Hydrocodone Bitart (Norco (5/325)) 1 tab Q6H  PRN PO PAIN LEVEL 


6-10;  Start 3/12/19 at 18:30


Acetaminophen (Tylenol Tab) 650 mg Q6H  PRN PO MILD PAIN(1-3)OR ELEVATED TEMP;  


Start 3/12/19 at 18:30


Ibuprofen (Motrin) 600 mg Q6H  PRN PO PAIN LEVEL 1-5;  Start 3/12/19 at 18:30


Ondansetron HCl (Zofran Inj) 4 mg Q6H  PRN IV NAUSEA AND/OR VOMITING Last 


administered on 3/18/19at 06:49; Admin Dose 4 MG;  Start 3/12/19 at 18:30


Miscellaneous Information (* Miscellaneous Pharmacy Order) DURAMORPH: 5 MG 


EPIDU... GIVEN NEURAXIAL XX ;  Start 3/12/19 at 21:00


Phenol (Chloraseptic Throat Spray) 2 spray Q2H  PRN MT SORE THROAT Last 


administered on 3/16/19at 21:48; Admin Dose 2 SPRAY;  Start 3/13/19 at 17:30


Hydromorphone HCl (Dilaudid) 0.4 mg Q10MIN  PRN IV SEVERE PAIN LEVEL 7-10 Last 


administered on 3/15/19at 00:06; Admin Dose 0.4 MG;  Start 3/14/19 at 08:30


Naloxone HCl (Narcan) 0.2 mg Q2M  PRN IV .RESP RATE;  Start 3/14/19 at 10:30


Fentanyl/ Ropivacaine 100 ml CONT EPIDURAL EPI  Last administered on 3/18/19at 


05:04; Admin Dose 100 ML;  Start 3/15/19 at 10:15


Zolpidem Tartrate (Ambien) 5 mg HS  PRN PO INSOMNIA Last administered on 


3/16/19at 22:03; Admin Dose 5 MG;  Start 3/15/19 at 16:00


Insulin Glargine (Lantus) 15 units DAILY@0800 SC ;  Start 3/17/19 at 08:00;  


Status Hold


Insulin Aspart (Novolog Insulin Pen) NOVOLOG *MODERATE* ALGORITHM AC MEALS AND  


BEDTIME SC  Last administered on 3/18/19at 13:03; Admin Dose 1 UNIT;  Start 


3/18/19 at 12:30











SHERINE GOLDSMITH 18, 2019 14:41

## 2019-03-18 NOTE — OPPN
Date/Time of Note


Date/Time of Note


DATE: 3/18/19 


TIME: 07:55





Anesthesia Follow up


Anesthesia Follow up


Last documented vital signs





Vital Signs


  Date      Temp  Pulse  Resp  B/P (MAP)   Pulse Ox  O2          O2 Flow    FiO2


Time                                                 Delivery    Rate


   3/18/19  98.2     91    19      121/73        99


     07:48                           (89)


   3/17/19                                           Room Air


     14:15





Respiratory function:  WNL


Cardiovascular function:  WNL


Comments


POD#6


Patient is a 58 yr old with Hs of recently Dx AdenoCA of Transverse Colon, went 


under GETA yesterday for Laparoscopic to Open Transverse Colectomy and Lysis of 


Adhesion.


Low Thoracic Epidural Catheter placed for post op pain management as well as 


Bilateral TAP block was done at the end of the surgery.


Patient has a Hx of DM, HTN but otherwise stable. He moves all extremity and no 


weakness reported, His vital signs have been stable, he is afebrile. His pain is


well controlled. The Epidural infusion was running at 11cc/hr.


He passed gas this am, Epidural catheter removed, DURAMORPH 5 MG epidurally 


given and also 12 ml of bolus of ropivacaine.


Epidural Catheter removed the tip intact, RN witnessed.


Dilaudid will be given for breakthrough pain.











KENNY CABELLO MD              Mar 18, 2019 08:01

## 2019-03-19 VITALS — SYSTOLIC BLOOD PRESSURE: 121 MMHG | RESPIRATION RATE: 19 BRPM | HEART RATE: 81 BPM | DIASTOLIC BLOOD PRESSURE: 62 MMHG

## 2019-03-19 VITALS — SYSTOLIC BLOOD PRESSURE: 119 MMHG | DIASTOLIC BLOOD PRESSURE: 70 MMHG | HEART RATE: 66 BPM | RESPIRATION RATE: 18 BRPM

## 2019-03-19 VITALS — DIASTOLIC BLOOD PRESSURE: 71 MMHG | RESPIRATION RATE: 18 BRPM | SYSTOLIC BLOOD PRESSURE: 123 MMHG | HEART RATE: 67 BPM

## 2019-03-19 VITALS — HEART RATE: 78 BPM | SYSTOLIC BLOOD PRESSURE: 119 MMHG | RESPIRATION RATE: 19 BRPM | DIASTOLIC BLOOD PRESSURE: 68 MMHG

## 2019-03-19 LAB
ADD MAN DIFF?: NO
BASOPHIL #: 0 10^3/UL (ref 0–0.1)
BASOPHILS %: 0.3 % (ref 0–2)
EOSINOPHILS #: 0.3 10^3/UL (ref 0–0.5)
EOSINOPHILS %: 3.1 % (ref 0–7)
HEMATOCRIT: 33.8 % (ref 42–52)
HEMOGLOBIN: 10.5 G/DL (ref 14–18)
IMMATURE GRANS #M: 0.04 10^3/UL (ref 0–0.03)
IMMATURE GRANS % (M): 0.5 % (ref 0–0.43)
LYMPHOCYTES #: 1.2 10^3/UL (ref 0.8–2.9)
LYMPHOCYTES %: 13.4 % (ref 15–51)
MAGNESIUM: 1.5 MG/DL (ref 1.7–2.5)
MEAN CORPUSCULAR HEMOGLOBIN: 24.8 PG (ref 29–33)
MEAN CORPUSCULAR HGB CONC: 31.1 G/DL (ref 32–37)
MEAN CORPUSCULAR VOLUME: 79.9 FL (ref 82–101)
MEAN PLATELET VOLUME: 9.8 FL (ref 7.4–10.4)
MONOCYTE #: 0.5 10^3/UL (ref 0.3–0.9)
MONOCYTES %: 5.2 % (ref 0–11)
NEUTROPHIL #: 6.7 10^3/UL (ref 1.6–7.5)
NEUTROPHILS %: 77.5 % (ref 39–77)
NUCLEATED RED BLOOD CELLS #: 0 10^3/UL (ref 0–0)
NUCLEATED RED BLOOD CELLS%: 0 /100WBC (ref 0–0)
PHOSPHORUS: 3.4 MG/DL (ref 2.5–4.9)
PLATELET COUNT: 343 10^3/UL (ref 140–415)
RED BLOOD COUNT: 4.23 10^6/UL (ref 4.7–6.1)
RED CELL DISTRIBUTION WIDTH: 17.1 % (ref 11.5–14.5)
WHITE BLOOD COUNT: 8.6 10^3/UL (ref 4.8–10.8)

## 2019-03-19 RX ADMIN — MORPHINE SULFATE PRN MG: 2 INJECTION, SOLUTION INTRAMUSCULAR; INTRAVENOUS at 08:53

## 2019-03-19 RX ADMIN — SODIUM CHLORIDE, PRESERVATIVE FREE 1 ML: 5 INJECTION INTRAVENOUS at 21:04

## 2019-03-19 RX ADMIN — HYDROMORPHONE HYDROCHLORIDE 1 MG: 1 INJECTION, SOLUTION INTRAMUSCULAR; INTRAVENOUS; SUBCUTANEOUS at 16:56

## 2019-03-19 RX ADMIN — INSULIN ASPART 1 UNIT: 100 INJECTION, SOLUTION INTRAVENOUS; SUBCUTANEOUS at 17:58

## 2019-03-19 RX ADMIN — HYDROCODONE BITARTRATE AND ACETAMINOPHEN PRN TAB: 5; 325 TABLET ORAL at 21:03

## 2019-03-19 RX ADMIN — ZOLPIDEM TARTRATE PRN MG: 5 TABLET, FILM COATED ORAL at 00:48

## 2019-03-19 RX ADMIN — MAGNESIUM SULFATE HEPTAHYDRATE 1 MLS/HR: 40 INJECTION, SOLUTION INTRAVENOUS at 14:25

## 2019-03-19 RX ADMIN — HYDROCODONE BITARTRATE AND ACETAMINOPHEN 1 TAB: 5; 325 TABLET ORAL at 21:03

## 2019-03-19 RX ADMIN — HYDROCODONE BITARTRATE AND ACETAMINOPHEN PRN TAB: 5; 325 TABLET ORAL at 14:45

## 2019-03-19 RX ADMIN — MORPHINE SULFATE 1 MG: 2 INJECTION, SOLUTION INTRAMUSCULAR; INTRAVENOUS at 08:53

## 2019-03-19 RX ADMIN — INSULIN ASPART 1 UNIT: 100 INJECTION, SOLUTION INTRAVENOUS; SUBCUTANEOUS at 20:55

## 2019-03-19 RX ADMIN — HYDROCODONE BITARTRATE AND ACETAMINOPHEN 1 TAB: 5; 325 TABLET ORAL at 14:45

## 2019-03-19 RX ADMIN — KETOROLAC TROMETHAMINE 1 MG: 15 INJECTION, SOLUTION INTRAMUSCULAR; INTRAVENOUS at 15:48

## 2019-03-19 RX ADMIN — MORPHINE SULFATE PRN MG: 2 INJECTION, SOLUTION INTRAMUSCULAR; INTRAVENOUS at 01:45

## 2019-03-19 RX ADMIN — MORPHINE SULFATE 1 MG: 2 INJECTION, SOLUTION INTRAMUSCULAR; INTRAVENOUS at 01:45

## 2019-03-19 RX ADMIN — ZOLPIDEM TARTRATE 1 MG: 5 TABLET, FILM COATED ORAL at 00:48

## 2019-03-19 RX ADMIN — KETOROLAC TROMETHAMINE PRN MG: 15 INJECTION, SOLUTION INTRAMUSCULAR; INTRAVENOUS at 15:48

## 2019-03-19 RX ADMIN — INSULIN ASPART 1 UNIT: 100 INJECTION, SOLUTION INTRAVENOUS; SUBCUTANEOUS at 13:03

## 2019-03-19 RX ADMIN — INSULIN ASPART 1 UNIT: 100 INJECTION, SOLUTION INTRAVENOUS; SUBCUTANEOUS at 08:49

## 2019-03-19 NOTE — OPPN
Date/Time of Note


Date/Time of Note


DATE: 3/19/19 


TIME: 13:44





Anesthesia Follow up


Anesthesia Follow up


Last documented vital signs





Vital Signs


  Date      Temp  Pulse  Resp  B/P (MAP)   Pulse Ox  O2          O2 Flow    FiO2


Time                                                 Delivery    Rate


   3/19/19  98.2     81    19      121/62        99


     07:37                           (81)


   3/18/19                                                                    21


     23:47


   3/18/19                                           Room Air


     15:13





Respiratory function:  WNL


Cardiovascular function:  WNL


Comments


POD#7


Patient is a 58 yr old with Hs of recently Dx AdenoCA of Transverse Colon, went 


under GETA yesterday for Laparoscopic to Open Transverse Colectomy and Lysis of 


Adhesion.


Low Thoracic Epidural Catheter placed for post op pain management as well as 


Bilateral TAP block was done at the end of the surgery.


Patient has a Hx of DM, HTN but otherwise stable. He moves all extremity and no 


weakness reported, His vital signs have been stable, he is afebrile. His pain is


well controlled with morphine prn. TThe Epidural catheter removed yesterday and 


Duramorph 5 mg was given epidurally before removing the catheter. He is doing 


well, ambulated and comfortable. His NG tube is been discontinued. He will start


liquid diet today. will be followed by his primary team.











KENNY CABELLO MD              Mar 19, 2019 13:46

## 2019-03-19 NOTE — CONS
Consult Date/Type/Reason


Admit Date/Time


Mar 1, 2019 at 20:38


Initial Consult Date


3/10/19


Requesting Provider:  HANNAH DOWNING MD


Date/Time of Note


DATE: 3/19/19 


TIME: 10:33





Subjective


NO acute events - NGT out - better overall - pt ambulating. 





ROS: No fever, no chills, no nausea, no vomiting, no diarrhea/constipation


        No recent weight changes


        No chest pain, no PND, no orthopnea - mild SOB 


        No dizziness, blurred vision


        No thirst, no heat or cold intolerance





Objective


Vitals





Vital Signs


  Date      Temp  Pulse  Resp  B/P (MAP)   Pulse Ox  O2          O2 Flow    FiO2


Time                                                 Delivery    Rate


   3/19/19  98.2     81    19      121/62        99


     07:37                           (81)


   3/18/19                                                                    21


     23:47


   3/18/19                                           Room Air


     15:13








Intake and Output





3/18/19


3/18/19


3/19/19





1515:00


23:00


07:00





IntakeIntake Total


1030 ml


480 ml


100 ml





OutputOutput Total


420 ml





BalanceBalance


610 ml


480 ml


100 ml











Exam


General: WN/WD/NAD, AOx 3


HEENT: Unicetric/atraumatic/EOMI (follow commands)


NECK: JVD elevated, no thyromegaly


Lymph: no lymphadenopathy


HEART: regular with no S3, II/VI systolic murmur at apex


LUNGS: Coarse sounds


ABD: soft, NT, ND, +BS - post op 


: Intact


Neuro: non focal


SKIN: chronic changes


EXT: trace edema





Results/Medications


Result Diagram:  


3/19/19 0949                                                                    


           3/17/19 0420





Results 24 hrs





Laboratory Tests


Test
                 3/18/19
12:59  3/18/19
17:50  3/18/19
21:05  3/19/19
08:40


Bedside Glucose               169            156            170            162


Test
                 3/19/19
09:49  
              
              



White Blood Count             8.6


Red Blood Count             4.23  L


Hemoglobin                  10.5  L


Hematocrit                  33.8  L


Mean Corpuscular            79.9  L


Volume


Mean Corpuscular            24.8  L


Hemoglobin


Mean Corpuscular           31.1  L
  
              
              



Hemoglobin
Concent


Red Cell                    17.1  H


Distribution Width


Platelet Count                343


Mean Platelet Volume          9.8


Immature                   0.500  H


Granulocytes %


Neutrophils %               77.5  H


Lymphocytes %               13.4  L


Monocytes %                   5.2


Eosinophils %                 3.1


Basophils %                   0.3


Nucleated Red Blood           0.0


Cells %


Immature                   0.040  H


Granulocytes #


Neutrophils #                 6.7


Lymphocytes #                 1.2


Monocytes #                   0.5


Eosinophils #                 0.3


Basophils #                   0.0


Nucleated Red Blood           0.0


Cells #





Medications





Current Medications


IV Flush (NS 3 ml) 3 ml PER PROTOCOL IV ;  Start 3/2/19 at 00:00


Zolpidem Tartrate (Ambien) 10 mg QHS  PRN PO .INSOMNIA Last administered on 


3/19/19at 00:48; Admin Dose 10 MG;  Start 3/2/19 at 00:00


Albuterol/ Ipratropium (Duoneb) 3 ml Q2H RESP THERAPY  PRN HHN SHORTNESS OF B


REATH;  Start 3/2/19 at 00:00


Miscellaneous Information 1 ea NOTE XX ;  Start 3/2/19 at 00:00


Glucose (Glutose) 15 gm Q15M  PRN PO DECREASED GLUCOSE;  Start 3/2/19 at 00:00


Glucose (Glutose) 22.5 gm Q15M  PRN PO DECREASED GLUCOSE;  Start 3/2/19 at 00:00


Dextrose (D50w Syringe) 25 ml Q15M  PRN IV DECREASED GLUCOSE;  Start 3/2/19 at 


00:00


Dextrose (D50w Syringe) 50 ml Q15M  PRN IV DECREASED GLUCOSE;  Start 3/2/19 at 


00:00


Glucagon (Glucagen) 1 mg Q15M  PRN IM DECREASED GLUCOSE;  Start 3/2/19 at 00:00


Glucose (Glutose) 15 gm Q15M  PRN BUCCAL DECREASED GLUCOSE;  Start 3/2/19 at 


00:00


Insulin Aspart (Novolog Insulin Pen) 8 unit WITH  MEALS SC  Last administered on


3/11/19at 17:58; Admin Dose 8 UNIT;  Start 3/9/19 at 18:00;  Status Hold


Acetaminophen/ Hydrocodone Bitart (Norco (5/325)) 1 tab Q6H  PRN PO PAIN LEVEL 


6-10;  Start 3/12/19 at 18:30


Acetaminophen (Tylenol Tab) 650 mg Q6H  PRN PO MILD PAIN(1-3)OR ELEVATED TEMP;  


Start 3/12/19 at 18:30


Ibuprofen (Motrin) 600 mg Q6H  PRN PO PAIN LEVEL 1-5;  Start 3/12/19 at 18:30


Ondansetron HCl (Zofran Inj) 4 mg Q6H  PRN IV NAUSEA AND/OR VOMITING Last 


administered on 3/18/19at 06:49; Admin Dose 4 MG;  Start 3/12/19 at 18:30


Miscellaneous Information (* Miscellaneous Pharmacy Order) DURAMORPH: 5 MG 


EPIDU... GIVEN NEURAXIAL XX ;  Start 3/12/19 at 21:00


Phenol (Chloraseptic Throat Spray) 2 spray Q2H  PRN MT SORE THROAT Last 


administered on 3/16/19at 21:48; Admin Dose 2 SPRAY;  Start 3/13/19 at 17:30


Naloxone HCl (Narcan) 0.2 mg Q2M  PRN IV .RESP RATE;  Start 3/14/19 at 10:30


Fentanyl/ Ropivacaine 100 ml CONT EPIDURAL EPI  Last administered on 3/18/19at 


05:04; Admin Dose 100 ML;  Start 3/15/19 at 10:15


Zolpidem Tartrate (Ambien) 5 mg HS  PRN PO INSOMNIA Last administered on 


3/16/19at 22:03; Admin Dose 5 MG;  Start 3/15/19 at 16:00


Insulin Glargine (Lantus) 15 units DAILY@0800 SC ;  Start 3/17/19 at 08:00;  


Status Hold


Insulin Aspart (Novolog Insulin Pen) NOVOLOG *MODERATE* ALGORITHM AC MEALS AND  


BEDTIME SC  Last administered on 3/19/19at 08:49; Admin Dose 2 UNIT;  Start 


3/18/19 at 12:30


Ketorolac Tromethamine (Toradol) 15 mg Q6H  PRN IV PAIN Last administered on 


3/18/19at 17:54; Admin Dose 15 MG;  Start 3/18/19 at 16:00;  Stop 3/21/19 at 


15:59


Morphine Sulfate (morphine) 2 mg Q8H  PRN IV SEVERE PAIN LEVEL 7-10;  Start 


3/19/19 at 16:00





Assessment/Plan


Hospital Course (Demo Recall)


1.  Preoperative evaluation in a patient prior to colectomy that has multiple 


cardiac risk factors and possible prior stent apparently placed approximately 2 


to 3 years prior.-neg trop x 2. Doing well post op - con't surgical care. 


Tolerate dprocedure well - better overall. 


2. Low BP -  better now, OK to hydrate as needed. BETTER now. 


3.  History of possible prior stent placements 2 to 3 years prior per chart 


biopsy. Tolerated procedure well.  On meds. 


4.  Dyslipidemia.


5.  Adenocarcinoma of the colon, newly diagnosed - post resection. 


6.  Abdominal pain secondary to adenocarcinoma of the colon - doing well post 


op. 


7.  Diabetes mellitus- on meds. 


8.  Anemia- H/H stable. 


9.  Renal failure insufficiency- CR 0.91 - will follow.











MAGDALENO US MD                 Mar 19, 2019 10:34

## 2019-03-19 NOTE — PN
Date/Time of Note


Date/Time of Note


DATE: 3/19/19 


TIME: 09:55





Assessment/Plan


Lines/Catheters


IV Catheter Type (from Nrs):  Saline Lock


Amaya in Place (from Nrs):  No





Assessment/Plan


Chief Complaint/Hosp Course


1.  Transverse colon adenocarcinoma,  Previous small bowel resection with 


primary anastomosis (antecolic over the cancer) and gastrojejunostomy; Ventral 


incisional Swiss cheese type hernia: s/p   Laparoscopic converted to open 


transverse colectomy, Revision of previous scar, Primary repair of ventral 


incisional Swiss cheese type hernia 3/12/19; +Bowel function.  Ileus resolved.  


Path noted > Oncology


-IS


-oob>ambulate qid


-chew gum


-ice pack to abdominal wall


-incision site local care


-Full liquids


-oncology follow-up


-pain control> transition to oral


2.  40 pound weight loss secondary to above


3.  Diabetes mellitus type 2


-Nutrition medication optimization


4.  Hypertension


-Nutrition medication optimization


5.  Coronary artery disease with history of Plavix and aspirin


-Cardiac evaluation and optimization


6.  Iron deficiency anemia secondary to above


-Iron transfusion per hematology


-As above





Thank you.  Patient seen and examined in collaboration with Dr. Helder Dugan.





Subjective


24 Hr Interval Summary


Feels well. Improved abdominal pain.  Tolerating clear liquids.  + Bowel 


function.  Congested cough.  No fevers, chills, sob, cp, palpitations, ha, 


dizziness, nausea, vomiting, diarrhea, dysuria.





Exam/Review of Systems


Vital Signs


Vitals





Vital Signs


  Date      Temp  Pulse  Resp  B/P (MAP)   Pulse Ox  O2          O2 Flow    FiO2


Time                                                 Delivery    Rate


   3/19/19  98.2     81    19      121/62        99


     07:37                           (81)


   3/18/19                                                                    21


     23:47


   3/18/19                                           Room Air


     15:13








Intake and Output





3/18/19


3/18/19


3/19/19





1515:00


23:00


07:00





IntakeIntake Total


1030 ml


480 ml


100 ml





OutputOutput Total


420 ml





BalanceBalance


610 ml


480 ml


100 ml














Exam


Free Text/Dictation


Constitutional:  alert, oriented; 


   No distress


Psych:  nl mood/affect; 


   No anxiety


Head:  normocephalic, atraumatic


Eyes:  nl conjunctiva, EOMI, PERRL; 


   No icteric


ENMT:  nl external ears & nose, mucosa pink and moist


Neck:  non-tender; 


   No jvd


Respiratory:  normal air movement; 


   No congested cough, No labored breathing


Cardiovascular:  regular rate and rhythm; 


   No edema


Gastrointestinal:  soft, min tender (tanner-incisional: midline staple line: dry, 


no erythema)


   No distended, No rebound or guarding


Genitourinary - Male:  nl scrotum, amaya


Musculoskeletal:  nl extremities to inspection, nl gait and stance; 


   No joint tenderness


Extremities:  normal pulses; 


   No calf tenderness, No edema


Neurological:  nl mental status, nl speech, nl strength


Skin:  nl turgor; 


   No rash or lesions


Lymph:  nl lymph nodes





Results


Result Diagram:  


3/18/19 0426                                                                    


           3/17/19 0420














PATRICIA ASHRAF NP       Mar 19, 2019 09:58

## 2019-03-19 NOTE — PN
Date/Time of Note


Date/Time of Note


DATE: 3/19/19 


TIME: 13:37





Assessment/Plan


VTE Prophylaxis


Risk score (from Nsg)>0 risk:  4


SCD applied (from Ns):  Yes


Pharmacological prophylaxis:  heparin





Lines/Catheters


IV Catheter Type (from Nrsg):  Saline Lock


Urinary Cath still in place:  No





Assessment/Plan


Hospital Course


57 yo male with DM who presented with hematochezia.  Workup has revealed colon 


adenocarcinoma.  No w s/p open hemicolectomy POD 2





Colon adenocarcinoma:


- Bone scan negative for mets


- s/p hemicolectomy with clean margins


- Dr Mcintyre following





s/p colectomy


- Hemicolectomy performed 3/12.  Periop management per surgery


- Advanced diet to clears





Iron deficiency anemia:


- s/p IV iron





Diabetes


- Basal/bolus insulin





CKD II:


- stable





Prophylaxis: SCDs





DC planning: Undomiciled currently, CM assistance with placement


Result Diagram:  


3/19/19 0949                                                                    


           3/17/19 0420





Results 24hrs





Laboratory Tests


Test
                 3/18/19
17:50  3/18/19
21:05  3/19/19
08:40  3/19/19
09:49


Bedside Glucose               156            170            162


White Blood Count                                                          8.6


Red Blood Count                                                          4.23  L


Hemoglobin                                                               10.5  L


Hematocrit                                                               33.8  L


Mean Corpuscular                                                         79.9  L


Volume


Mean Corpuscular                                                         24.8  L


Hemoglobin


Mean Corpuscular      
              
              
                   31.1  L



Hemoglobin
Concent


Red Cell                                                                 17.1  H


Distribution Width


Platelet Count                                                             343


Mean Platelet Volume                                                       9.8


Immature                                                                0.500  H


Granulocytes %


Neutrophils %                                                            77.5  H


Lymphocytes %                                                            13.4  L


Monocytes %                                                                5.2


Eosinophils %                                                              3.1


Basophils %                                                                0.3


Nucleated Red Blood                                                        0.0


Cells %


Immature                                                                0.040  H


Granulocytes #


Neutrophils #                                                              6.7


Lymphocytes #                                                              1.2


Monocytes #                                                                0.5


Eosinophils #                                                              0.3


Basophils #                                                                0.0


Nucleated Red Blood                                                        0.0


Cells #


Phosphorus Level                                                           3.4


Magnesium Level                                                           1.5  L


Test
                 3/19/19
12:40  
              
              



Bedside Glucose               219








Subjective


24 Hr Interval Summary


Free Text/Dictation


Tolerating PO diet


Pain is controlled


No complaints





Exam/Review of Systems


Exam


Vitals





Vital Signs


  Date      Temp  Pulse  Resp  B/P (MAP)   Pulse Ox  O2          O2 Flow    FiO2


Time                                                 Delivery    Rate


   3/19/19  98.2     81    19      121/62        99


     07:37                           (81)


   3/18/19                                                                    21


     23:47


   3/18/19                                           Room Air


     15:13








Intake and Output





3/18/19


3/18/19


3/19/19





1515:00


23:00


07:00





IntakeIntake Total


1030 ml


480 ml


100 ml





OutputOutput Total


420 ml





BalanceBalance


610 ml


480 ml


100 ml











Constitutional:  alert, oriented, well developed


Psych:  no complaints, nl mood/affect


Head:  normocephalic, atraumatic


Eyes:  nl conjunctiva, EOMI, nl lids, nl sclera, PERRL


ENMT:  nl external ears & nose, nl lips & teeth, nl nasal mucosa & septum


Neck:  supple, non-tender


Respiratory:  clear to auscultation, normal air movement


Cardiovascular:  regular rate and rhythm, nl pulses


Gastrointestinal:  soft, nl liver, spleen, non-tender


Musculoskeletal:  nl extremities to inspection, nl gait and stance


Extremities:  normal pulses


Neurological:  CNS II-XII intact, nl mental status, nl speech, nl strength


Skin:  nl turgor; 


   No rash or lesions


Lymph:  nl lymph nodes





Results


Results 24hrs





Laboratory Tests


Test
                 3/18/19
17:50  3/18/19
21:05  3/19/19
08:40  3/19/19
09:49


Bedside Glucose               156            170            162


White Blood Count                                                          8.6


Red Blood Count                                                          4.23  L


Hemoglobin                                                               10.5  L


Hematocrit                                                               33.8  L


Mean Corpuscular                                                         79.9  L


Volume


Mean Corpuscular                                                         24.8  L


Hemoglobin


Mean Corpuscular      
              
              
                   31.1  L



Hemoglobin
Concent


Red Cell                                                                 17.1  H


Distribution Width


Platelet Count                                                             343


Mean Platelet Volume                                                       9.8


Immature                                                                0.500  H


Granulocytes %


Neutrophils %                                                            77.5  H


Lymphocytes %                                                            13.4  L


Monocytes %                                                                5.2


Eosinophils %                                                              3.1


Basophils %                                                                0.3


Nucleated Red Blood                                                        0.0


Cells %


Immature                                                                0.040  H


Granulocytes #


Neutrophils #                                                              6.7


Lymphocytes #                                                              1.2


Monocytes #                                                                0.5


Eosinophils #                                                              0.3


Basophils #                                                                0.0


Nucleated Red Blood                                                        0.0


Cells #


Phosphorus Level                                                           3.4


Magnesium Level                                                           1.5  L


Test
                 3/19/19
12:40  
              
              



Bedside Glucose               219








Medications


Medication





Current Medications


IV Flush (NS 3 ml) 3 ml PER PROTOCOL IV ;  Start 3/2/19 at 00:00


Zolpidem Tartrate (Ambien) 10 mg QHS  PRN PO .INSOMNIA Last administered on 


3/19/19at 00:48; Admin Dose 10 MG;  Start 3/2/19 at 00:00


Albuterol/ Ipratropium (Duoneb) 3 ml Q2H RESP THERAPY  PRN HHN SHORTNESS OF 


BREATH;  Start 3/2/19 at 00:00


Miscellaneous Information 1 ea NOTE XX ;  Start 3/2/19 at 00:00


Glucose (Glutose) 15 gm Q15M  PRN PO DECREASED GLUCOSE;  Start 3/2/19 at 00:00


Glucose (Glutose) 22.5 gm Q15M  PRN PO DECREASED GLUCOSE;  Start 3/2/19 at 00:00


Dextrose (D50w Syringe) 25 ml Q15M  PRN IV DECREASED GLUCOSE;  Start 3/2/19 at 


00:00


Dextrose (D50w Syringe) 50 ml Q15M  PRN IV DECREASED GLUCOSE;  Start 3/2/19 at 


00:00


Glucagon (Glucagen) 1 mg Q15M  PRN IM DECREASED GLUCOSE;  Start 3/2/19 at 00:00


Glucose (Glutose) 15 gm Q15M  PRN BUCCAL DECREASED GLUCOSE;  Start 3/2/19 at 


00:00


Insulin Aspart (Novolog Insulin Pen) 8 unit WITH  MEALS SC  Last administered on


3/11/19at 17:58; Admin Dose 8 UNIT;  Start 3/9/19 at 18:00;  Status Hold


Acetaminophen/ Hydrocodone Bitart (Norco (5/325)) 1 tab Q6H  PRN PO PAIN LEVEL 


6-10;  Start 3/12/19 at 18:30


Acetaminophen (Tylenol Tab) 650 mg Q6H  PRN PO MILD PAIN(1-3)OR ELEVATED TEMP;  


Start 3/12/19 at 18:30


Ibuprofen (Motrin) 600 mg Q6H  PRN PO PAIN LEVEL 1-5;  Start 3/12/19 at 18:30


Ondansetron HCl (Zofran Inj) 4 mg Q6H  PRN IV NAUSEA AND/OR VOMITING Last 


administered on 3/18/19at 06:49; Admin Dose 4 MG;  Start 3/12/19 at 18:30


Miscellaneous Information (* Miscellaneous Pharmacy Order) DURAMORPH: 5 MG 


EPIDU... GIVEN NEURAXIAL XX ;  Start 3/12/19 at 21:00


Phenol (Chloraseptic Throat Spray) 2 spray Q2H  PRN MT SORE THROAT Last 


administered on 3/16/19at 21:48; Admin Dose 2 SPRAY;  Start 3/13/19 at 17:30


Naloxone HCl (Narcan) 0.2 mg Q2M  PRN IV .RESP RATE;  Start 3/14/19 at 10:30


Fentanyl/ Ropivacaine 100 ml CONT EPIDURAL EPI  Last administered on 3/18/19at 


05:04; Admin Dose 100 ML;  Start 3/15/19 at 10:15


Zolpidem Tartrate (Ambien) 5 mg HS  PRN PO INSOMNIA Last administered on 


3/16/19at 22:03; Admin Dose 5 MG;  Start 3/15/19 at 16:00


Insulin Glargine (Lantus) 15 units DAILY@0800 SC ;  Start 3/17/19 at 08:00;  


Status Hold


Insulin Aspart (Novolog Insulin Pen) NOVOLOG *MODERATE* ALGORITHM AC MEALS AND  


BEDTIME SC  Last administered on 3/19/19at 13:03; Admin Dose 4 UNIT;  Start 


3/18/19 at 12:30


Ketorolac Tromethamine (Toradol) 15 mg Q6H  PRN IV PAIN Last administered on 


3/18/19at 17:54; Admin Dose 15 MG;  Start 3/18/19 at 16:00;  Stop 3/21/19 at 


15:59


Morphine Sulfate (morphine) 2 mg Q8H  PRN IV SEVERE PAIN LEVEL 7-10;  Start 


3/19/19 at 16:00


Magnesium Sulfate 50 ml @ 25 mls/hr ONCE  ONCE IVPB ;  Start 3/19/19 at 12:30;  


Stop 3/19/19 at 14:29











HANNAH DOWNING MD          Mar 19, 2019 13:38

## 2019-03-20 VITALS — HEART RATE: 66 BPM | RESPIRATION RATE: 16 BRPM | DIASTOLIC BLOOD PRESSURE: 78 MMHG | SYSTOLIC BLOOD PRESSURE: 121 MMHG

## 2019-03-20 VITALS — SYSTOLIC BLOOD PRESSURE: 115 MMHG | DIASTOLIC BLOOD PRESSURE: 74 MMHG | HEART RATE: 72 BPM | RESPIRATION RATE: 19 BRPM

## 2019-03-20 VITALS — DIASTOLIC BLOOD PRESSURE: 79 MMHG | RESPIRATION RATE: 18 BRPM | HEART RATE: 69 BPM | SYSTOLIC BLOOD PRESSURE: 119 MMHG

## 2019-03-20 VITALS — RESPIRATION RATE: 18 BRPM | HEART RATE: 66 BPM | SYSTOLIC BLOOD PRESSURE: 123 MMHG | DIASTOLIC BLOOD PRESSURE: 76 MMHG

## 2019-03-20 LAB
ADD MAN DIFF?: NO
BASOPHIL #: 0 10^3/UL (ref 0–0.1)
BASOPHILS %: 0.2 % (ref 0–2)
EOSINOPHILS #: 0.3 10^3/UL (ref 0–0.5)
EOSINOPHILS %: 3 % (ref 0–7)
HEMATOCRIT: 32.3 % (ref 42–52)
HEMOGLOBIN: 10.1 G/DL (ref 14–18)
IMMATURE GRANS #M: 0.04 10^3/UL (ref 0–0.03)
IMMATURE GRANS % (M): 0.5 % (ref 0–0.43)
LYMPHOCYTES #: 1.2 10^3/UL (ref 0.8–2.9)
LYMPHOCYTES %: 13.5 % (ref 15–51)
MEAN CORPUSCULAR HEMOGLOBIN: 24.8 PG (ref 29–33)
MEAN CORPUSCULAR HGB CONC: 31.3 G/DL (ref 32–37)
MEAN CORPUSCULAR VOLUME: 79.2 FL (ref 82–101)
MEAN PLATELET VOLUME: 9.9 FL (ref 7.4–10.4)
MONOCYTE #: 0.3 10^3/UL (ref 0.3–0.9)
MONOCYTES %: 3.4 % (ref 0–11)
NEUTROPHIL #: 7 10^3/UL (ref 1.6–7.5)
NEUTROPHILS %: 79.4 % (ref 39–77)
NUCLEATED RED BLOOD CELLS #: 0 10^3/UL (ref 0–0)
NUCLEATED RED BLOOD CELLS%: 0 /100WBC (ref 0–0)
PLATELET COUNT: 357 10^3/UL (ref 140–415)
RED BLOOD COUNT: 4.08 10^6/UL (ref 4.7–6.1)
RED CELL DISTRIBUTION WIDTH: 17 % (ref 11.5–14.5)
WHITE BLOOD COUNT: 8.8 10^3/UL (ref 4.8–10.8)

## 2019-03-20 RX ADMIN — KETOROLAC TROMETHAMINE 1 MG: 15 INJECTION, SOLUTION INTRAMUSCULAR; INTRAVENOUS at 14:04

## 2019-03-20 RX ADMIN — INSULIN ASPART 1 UNIT: 100 INJECTION, SOLUTION INTRAVENOUS; SUBCUTANEOUS at 18:01

## 2019-03-20 RX ADMIN — ZOLPIDEM TARTRATE 1 MG: 5 TABLET, FILM COATED ORAL at 00:39

## 2019-03-20 RX ADMIN — IBUPROFEN PRN MG: 600 TABLET ORAL at 20:24

## 2019-03-20 RX ADMIN — KETOROLAC TROMETHAMINE 1 MG: 15 INJECTION, SOLUTION INTRAMUSCULAR; INTRAVENOUS at 07:40

## 2019-03-20 RX ADMIN — INSULIN ASPART 1 UNIT: 100 INJECTION, SOLUTION INTRAVENOUS; SUBCUTANEOUS at 22:00

## 2019-03-20 RX ADMIN — MORPHINE SULFATE PRN MG: 2 INJECTION, SOLUTION INTRAMUSCULAR; INTRAVENOUS at 02:17

## 2019-03-20 RX ADMIN — ZOLPIDEM TARTRATE PRN MG: 5 TABLET, FILM COATED ORAL at 00:39

## 2019-03-20 RX ADMIN — INSULIN ASPART 1 UNIT: 100 INJECTION, SOLUTION INTRAVENOUS; SUBCUTANEOUS at 09:18

## 2019-03-20 RX ADMIN — KETOROLAC TROMETHAMINE PRN MG: 15 INJECTION, SOLUTION INTRAMUSCULAR; INTRAVENOUS at 14:04

## 2019-03-20 RX ADMIN — KETOROLAC TROMETHAMINE PRN MG: 15 INJECTION, SOLUTION INTRAMUSCULAR; INTRAVENOUS at 07:40

## 2019-03-20 RX ADMIN — IBUPROFEN 1 MG: 600 TABLET ORAL at 20:24

## 2019-03-20 RX ADMIN — POLYETHYLENE GLYCOL 3350 PRN GM: 17 POWDER, FOR SOLUTION ORAL at 18:44

## 2019-03-20 RX ADMIN — MORPHINE SULFATE 1 MG: 2 INJECTION, SOLUTION INTRAMUSCULAR; INTRAVENOUS at 02:17

## 2019-03-20 RX ADMIN — POLYETHYLENE GLYCOL 3350 1 GM: 17 POWDER, FOR SOLUTION ORAL at 18:44

## 2019-03-20 RX ADMIN — INSULIN ASPART 1 UNIT: 100 INJECTION, SOLUTION INTRAVENOUS; SUBCUTANEOUS at 13:24

## 2019-03-20 NOTE — PN
Date/Time of Note


Date/Time of Note


DATE: 3/20/19 


TIME: 09:59





Assessment/Plan


Lines/Catheters


IV Catheter Type (from Nrs):  Saline Lock


Amaya in Place (from Nrs):  No





Assessment/Plan


Chief Complaint/Hosp Course


1.  Transverse colon adenocarcinoma,  Previous small bowel resection with 


primary anastomosis (antecolic over the cancer) and gastrojejunostomy; Ventral 


incisional Swiss cheese type hernia: s/p   Laparoscopic converted to open 


transverse colectomy, Revision of previous scar, Primary repair of ventral 


incisional Swiss cheese type hernia 3/12/19; +Bowel function.  Ileus resolved.  


Path noted > Oncology


-IS


-oob>ambulate qid


-chew gum


-ice pack to abdominal wall


-incision site local care


-oncology follow-up


-pain control> transition to oral


-soft diet


-dc planning ok from surgical standpoint


2.  40 pound weight loss secondary to above


3.  Diabetes mellitus type 2


-Nutrition medication optimization


4.  Hypertension


-Nutrition medication optimization


5.  Coronary artery disease with history of Plavix and aspirin


-Cardiac evaluation and optimization


6.  Iron deficiency anemia secondary to above


-Iron transfusion per hematology


-As above





Thank you.  Patient seen and examined in collaboration with Dr. Helder Dugan.





Subjective


24 Hr Interval Summary


Feels well. Intermittent abdominal pain. No fevers, chills, sob, congested 


cough, cp, palpitations, ha, dizziness, n/v/d/dysuria. + bowel function. 


tolerating solid diet.





Exam/Review of Systems


Vital Signs


Vitals





Vital Signs


  Date      Temp  Pulse  Resp  B/P (MAP)   Pulse Ox  O2          O2 Flow    FiO2


Time                                                 Delivery    Rate


   3/20/19  98.3     66    18      123/76        99  Room Air


     08:16                           (92)


   3/18/19                                                                    21


     23:47








Intake and Output





3/19/19


3/19/19


3/20/19





1515:00


23:00


07:00





IntakeIntake Total


730 ml


100 ml





OutputOutput Total


2 ml





BalanceBalance


728 ml


100 ml














Exam


Free Text/Dictation


Constitutional:  alert, oriented; 


   No distress


Psych:  nl mood/affect; 


   No anxiety


Head:  normocephalic, atraumatic


Eyes:  nl conjunctiva, EOMI, PERRL; 


   No icteric


ENMT:  nl external ears & nose, mucosa pink and moist


Neck:  non-tender; 


   No jvd


Respiratory:  normal air movement; 


   No congested cough, No labored breathing


Cardiovascular:  regular rate and rhythm; 


   No edema


Gastrointestinal:  soft, min tender (tanner-incisional: midline staple line: dry, 


no erythema)


   No distended, No rebound or guarding


Genitourinary - Male:  nl scrotum, amaya


Musculoskeletal:  nl extremities to inspection, nl gait and stance; 


   No joint tenderness


Extremities:  normal pulses; 


   No calf tenderness, No edema


Neurological:  nl mental status, nl speech, nl strength


Skin:  nl turgor; 


   No rash or lesions


Lymph:  nl lymph nodes





Results


Result Diagram:  


3/20/19 0445                                                                    


           3/17/19 0420














PATRICIA ASHRAF NP       Mar 20, 2019 10:01

## 2019-03-20 NOTE — PN
Date/Time of Note


Date/Time of Note


DATE: 3/20/19 


TIME: 17:08





Assessment/Plan


VTE Prophylaxis


Risk score (from Nsg)>0 risk:  4


SCD applied (from Nsg):  Yes


Pharmacological prophylaxis:  heparin





Lines/Catheters


IV Catheter Type (from Nrsg):  Saline Lock


Urinary Cath still in place:  No





Assessment/Plan


Hospital Course


59 yo male with DM who presented with hematochezia.  Workup has revealed colon 


adenocarcinoma.  No w s/p open hemicolectomy POD 2





Colon adenocarcinoma:


- Bone scan negative for mets


- s/p hemicolectomy with clean margins


- Dr Mcintyre following





s/p colectomy


- Hemicolectomy performed 3/12.  Periop management per surgery


- Advanced diet to clears





Iron deficiency anemia:


- s/p IV iron





Diabetes


- Basal/bolus insulin





CKD II:


- stable





Prophylaxis: SCDs





DC planning: Undomiciled currently, CM told patient he needs to leave Friday


Result Diagram:  


3/20/19 0445                                                                    


           3/17/19 0420





Results 24hrs





Laboratory Tests


Test
                 3/19/19
17:55  3/19/19
20:48  3/20/19
02:16  3/20/19
04:45


Bedside Glucose               184           240  H         235  H


White Blood Count                                                          8.8


Red Blood Count                                                          4.08  L


Hemoglobin                                                               10.1  L


Hematocrit                                                               32.3  L


Mean Corpuscular                                                         79.2  L


Volume


Mean Corpuscular                                                         24.8  L


Hemoglobin


Mean Corpuscular      
              
              
                   31.3  L



Hemoglobin
Concent


Red Cell                                                                 17.0  H


Distribution Width


Platelet Count                                                             357


Mean Platelet Volume                                                       9.9


Immature                                                                0.500  H


Granulocytes %


Neutrophils %                                                            79.4  H


Lymphocytes %                                                            13.5  L


Monocytes %                                                                3.4


Eosinophils %                                                              3.0


Basophils %                                                                0.2


Nucleated Red Blood                                                        0.0


Cells %


Immature                                                                0.040  H


Granulocytes #


Neutrophils #                                                              7.0


Lymphocytes #                                                              1.2


Monocytes #                                                                0.3


Eosinophils #                                                              0.3


Basophils #                                                                0.0


Nucleated Red Blood                                                        0.0


Cells #


Test
                 3/20/19
08:49  3/20/19
13:20  
              



Bedside Glucose              231  H          220








Subjective


24 Hr Interval Summary


Free Text/Dictation


Patient's pain is controlled


Told he needs to be discharged as insurance no longer paying.  Begged not to be 


discharged.  Told he can stay until Friday





Exam/Review of Systems


Exam


Vitals





Vital Signs


  Date      Temp  Pulse  Resp  B/P (MAP)   Pulse Ox  O2          O2 Flow    FiO2


Time                                                 Delivery    Rate


   3/20/19  98.3     72    19      115/74        99  Room Air


     15:24                           (88)


   3/18/19                                                                    21


     23:47








Intake and Output





3/19/19


3/19/19


3/20/19





1515:00


23:00


07:00





IntakeIntake Total


730 ml


100 ml





OutputOutput Total


2 ml





BalanceBalance


728 ml


100 ml











Constitutional:  alert, oriented, well developed


Psych:  no complaints, nl mood/affect


Head:  normocephalic, atraumatic


Eyes:  nl conjunctiva, EOMI, nl lids, nl sclera, PERRL


ENMT:  nl external ears & nose, nl lips & teeth, nl nasal mucosa & septum


Neck:  supple, non-tender


Respiratory:  clear to auscultation, normal air movement


Cardiovascular:  regular rate and rhythm, nl pulses


Gastrointestinal:  soft, nl liver, spleen, non-tender


Musculoskeletal:  nl extremities to inspection, nl gait and stance


Extremities:  normal pulses


Neurological:  CNS II-XII intact, nl mental status, nl speech, nl strength


Skin:  nl turgor; 


   No rash or lesions


Lymph:  nl lymph nodes





Results


Results 24hrs





Laboratory Tests


Test
                 3/19/19
17:55  3/19/19
20:48  3/20/19
02:16  3/20/19
04:45


Bedside Glucose               184           240  H         235  H


White Blood Count                                                          8.8


Red Blood Count                                                          4.08  L


Hemoglobin                                                               10.1  L


Hematocrit                                                               32.3  L


Mean Corpuscular                                                         79.2  L


Volume


Mean Corpuscular                                                         24.8  L


Hemoglobin


Mean Corpuscular      
              
              
                   31.3  L



Hemoglobin
Concent


Red Cell                                                                 17.0  H


Distribution Width


Platelet Count                                                             357


Mean Platelet Volume                                                       9.9


Immature                                                                0.500  H


Granulocytes %


Neutrophils %                                                            79.4  H


Lymphocytes %                                                            13.5  L


Monocytes %                                                                3.4


Eosinophils %                                                              3.0


Basophils %                                                                0.2


Nucleated Red Blood                                                        0.0


Cells %


Immature                                                                0.040  H


Granulocytes #


Neutrophils #                                                              7.0


Lymphocytes #                                                              1.2


Monocytes #                                                                0.3


Eosinophils #                                                              0.3


Basophils #                                                                0.0


Nucleated Red Blood                                                        0.0


Cells #


Test
                 3/20/19
08:49  3/20/19
13:20  
              



Bedside Glucose              231  H          220








Medications


Medication





Current Medications


IV Flush (NS 3 ml) 3 ml PER PROTOCOL IV  Last administered on 3/19/19at 21:04; 


Admin Dose 3 ML;  Start 3/2/19 at 00:00


Zolpidem Tartrate (Ambien) 10 mg QHS  PRN PO .INSOMNIA Last administered on 


3/20/19at 00:39; Admin Dose 10 MG;  Start 3/2/19 at 00:00


Albuterol/ Ipratropium (Duoneb) 3 ml Q2H RESP THERAPY  PRN HHN SHORTNESS OF 


BREATH;  Start 3/2/19 at 00:00


Miscellaneous Information 1 ea NOTE XX ;  Start 3/2/19 at 00:00


Glucose (Glutose) 15 gm Q15M  PRN PO DECREASED GLUCOSE;  Start 3/2/19 at 00:00


Glucose (Glutose) 22.5 gm Q15M  PRN PO DECREASED GLUCOSE;  Start 3/2/19 at 00:00


Dextrose (D50w Syringe) 25 ml Q15M  PRN IV DECREASED GLUCOSE;  Start 3/2/19 at 


00:00


Dextrose (D50w Syringe) 50 ml Q15M  PRN IV DECREASED GLUCOSE;  Start 3/2/19 at 


00:00


Glucagon (Glucagen) 1 mg Q15M  PRN IM DECREASED GLUCOSE;  Start 3/2/19 at 00:00


Glucose (Glutose) 15 gm Q15M  PRN BUCCAL DECREASED GLUCOSE;  Start 3/2/19 at 


00:00


Insulin Aspart (Novolog Insulin Pen) 8 unit WITH  MEALS SC  Last administered on


3/11/19at 17:58; Admin Dose 8 UNIT;  Start 3/9/19 at 18:00;  Status Hold


Acetaminophen/ Hydrocodone Bitart (Norco (5/325)) 1 tab Q6H  PRN PO PAIN LEVEL 


6-10 Last administered on 3/19/19at 21:03; Admin Dose 1 TAB;  Start 3/12/19 at 


18:30


Acetaminophen (Tylenol Tab) 650 mg Q6H  PRN PO MILD PAIN(1-3)OR ELEVATED TEMP;  


Start 3/12/19 at 18:30


Ibuprofen (Motrin) 600 mg Q6H  PRN PO PAIN LEVEL 1-5;  Start 3/12/19 at 18:30


Ondansetron HCl (Zofran Inj) 4 mg Q6H  PRN IV NAUSEA AND/OR VOMITING Last 


administered on 3/18/19at 06:49; Admin Dose 4 MG;  Start 3/12/19 at 18:30


Miscellaneous Information (* Miscellaneous Pharmacy Order) DURAMORPH: 5 MG 


EPIDU... GIVEN NEURAXIAL XX ;  Start 3/12/19 at 21:00


Phenol (Chloraseptic Throat Spray) 2 spray Q2H  PRN MT SORE THROAT Last 


administered on 3/16/19at 21:48; Admin Dose 2 SPRAY;  Start 3/13/19 at 17:30


Naloxone HCl (Narcan) 0.2 mg Q2M  PRN IV .RESP RATE;  Start 3/14/19 at 10:30


Fentanyl/ Ropivacaine 100 ml CONT EPIDURAL EPI  Last administered on 3/18/19at 


05:04; Admin Dose 100 ML;  Start 3/15/19 at 10:15


Zolpidem Tartrate (Ambien) 5 mg HS  PRN PO INSOMNIA Last administered on 


3/16/19at 22:03; Admin Dose 5 MG;  Start 3/15/19 at 16:00


Insulin Glargine (Lantus) 15 units DAILY@0800 SC ;  Start 3/17/19 at 08:00


Insulin Aspart (Novolog Insulin Pen) NOVOLOG *MODERATE* ALGORITHM AC MEALS AND  


BEDTIME SC  Last administered on 3/20/19at 13:24; Admin Dose 4 UNIT;  Start 


3/18/19 at 12:30


Ketorolac Tromethamine (Toradol) 15 mg Q6H  PRN IV PAIN Last administered on 


3/20/19at 14:04; Admin Dose 15 MG;  Start 3/18/19 at 16:00;  Stop 3/21/19 at 


15:59


Morphine Sulfate (morphine) 2 mg Q8H  PRN IV SEVERE PAIN LEVEL 7-10 Last 


administered on 3/20/19at 02:17; Admin Dose 2 MG;  Start 3/19/19 at 16:00


Simethicone (Mylicon) 80 mg Q8  PRN PO DISTENSION/GAS/BLOATING Last administered


on 3/20/19at 06:45; Admin Dose 80 MG;  Start 3/20/19 at 05:00











HANNAH DOWNING MD          Mar 20, 2019 17:12

## 2019-03-20 NOTE — CONS
Assessment/Plan


Assessment/Plan


Hospital Course (Demo Recall)


59 yo with newly diagnosed colon adenoca


-CT chest shows 4 mm noncalcified nodule in the right apex, which is 


nonspecific. 


-CT AP: Irregular bowel wall thickening / mass with adjacent stranding of the 


distal transverse colon, compatible with given history of colon cancer. Shotty 


periaortic lymph nodes. The largest is an 11 mm node with normal fatty hilum, 


suggestive of reactive etiology.  Small nonspecific peripherally sclerotic 


lesion in the left iliac bone measuring 6 mm.


-bone scan negative for mets


-he is s/p surgery: per notes this revealed previous small bowel resection with 


primary anastomosis (antecolic over the cancer) and gastrojejunostomy; Ventral 


incisional Swiss cheese type hernia: s/p   Laparoscopic converted to open 


transverse colectomy, Revision of previous scar, Primary repair of ventral 


incisional Swiss cheese type hernia 3/12/19


-path shows T3N0 disease, no high risk features, can consider adjuvant xeloda or


5FU. once pt is fully recovered, will discuss further his treatment options as 


an outpt, but overall he has a good prognosis given early stage disease





Consultation Date/Type/Reason


Admit Date/Time


Mar 1, 2019 at 20:38


Initial Consult Date


3/6/19


Requesting Provider:  HANNAH DOWNING MD


Date/Time of Note


DATE: 3/20/19 


TIME: 16:06





24 HR Interval Summary


Free Text/Dictation


path looks good:


PROCEDURE:  Transverse colectomy.


 


TUMOR SITE:  Transverse colon. 


 


TUMOR SIZE:   11.0 x 6.0 x 1.5 cm.


 


MACROSCOPIC TUMOR PERFORATION:  Not identified. 


 


HISTOLOGIC TYPE:   Adenocarcinoma with mucinous differentiation.


 


HISTOLOGIC GRADE:  Moderately-differentiated.


 


TUMOR EXTENSION:  Tumor invades through the muscularis propria, focally into 


pericolonic tissue. 


 


MARGINS:  All margins are uninvolved by invasive carcinoma.


 


***** Proximal margin: Clear by 8.5 cm.


***** Distal margin:  Clear by 15.5 cm.


***** Mesenteric margin:  Clear by 1.5 cm.


 


TREATMENT EFFECT:  No known presurgical therapy.


 


LYMPH-VASCULAR INVASION:  Not identified.


 


Continued Next Page. . . 


 


PERINEURAL INVASION:  Not identified.


 


TUMOR BUDDING:  Low score.


 


TYPE OF POLYP IN WHICH INVASIVE CARCINOMA AROSE:  None identified.  


 


TUMOR DEPOSIT:  Not identified. 


 


LYMPH NODES: 


 


***** Number of lymph nodes involved:  0.


***** Number of lymph nodes examined:  18.


 


PATHOLOGIC STAGE CLASSIFICATION (pTNM, AJCC 8TH EDITION):  pT3 pN0.





Exam/Review of Systems


Exam


Vitals





Vital Signs


  Date      Temp  Pulse  Resp  B/P (MAP)   Pulse Ox  O2          O2 Flow    FiO2


Time                                                 Delivery    Rate


   3/20/19  98.3     72    19      115/74        99  Room Air


     15:24                           (88)


   3/18/19                                                                    21


     23:47








Intake and Output





3/19/19


3/19/19


3/20/19





1515:00


23:00


07:00





IntakeIntake Total


730 ml


100 ml





OutputOutput Total


2 ml





BalanceBalance


728 ml


100 ml











Constitutional:  alert, oriented, well developed


Psych:  no complaints, nl mood/affect


Head:  normocephalic, atraumatic


Gastrointestinal:  surgical scars





Results


Result Diagram:  


3/20/19 0445                                                                    


           3/17/19 0420





Results 24hrs





Laboratory Tests


Test
                 3/19/19
17:55  3/19/19
20:48  3/20/19
02:16  3/20/19
04:45


Bedside Glucose               184           240  H         235  H


White Blood Count                                                          8.8


Red Blood Count                                                          4.08  L


Hemoglobin                                                               10.1  L


Hematocrit                                                               32.3  L


Mean Corpuscular                                                         79.2  L


Volume


Mean Corpuscular                                                         24.8  L


Hemoglobin


Mean Corpuscular      
              
              
                   31.3  L



Hemoglobin
Concent


Red Cell                                                                 17.0  H


Distribution Width


Platelet Count                                                             357


Mean Platelet Volume                                                       9.9


Immature                                                                0.500  H


Granulocytes %


Neutrophils %                                                            79.4  H


Lymphocytes %                                                            13.5  L


Monocytes %                                                                3.4


Eosinophils %                                                              3.0


Basophils %                                                                0.2


Nucleated Red Blood                                                        0.0


Cells %


Immature                                                                0.040  H


Granulocytes #


Neutrophils #                                                              7.0


Lymphocytes #                                                              1.2


Monocytes #                                                                0.3


Eosinophils #                                                              0.3


Basophils #                                                                0.0


Nucleated Red Blood                                                        0.0


Cells #


Test
                 3/20/19
08:49  3/20/19
13:20  
              



Bedside Glucose              231  H          220








Medications


Medication





Current Medications


IV Flush (NS 3 ml) 3 ml PER PROTOCOL IV  Last administered on 3/19/19at 21:04; 


Admin Dose 3 ML;  Start 3/2/19 at 00:00


Zolpidem Tartrate (Ambien) 10 mg QHS  PRN PO .INSOMNIA Last administered on 


3/20/19at 00:39; Admin Dose 10 MG;  Start 3/2/19 at 00:00


Albuterol/ Ipratropium (Duoneb) 3 ml Q2H RESP THERAPY  PRN HHN SHORTNESS OF 


BREATH;  Start 3/2/19 at 00:00


Miscellaneous Information 1 ea NOTE XX ;  Start 3/2/19 at 00:00


Glucose (Glutose) 15 gm Q15M  PRN PO DECREASED GLUCOSE;  Start 3/2/19 at 00:00


Glucose (Glutose) 22.5 gm Q15M  PRN PO DECREASED GLUCOSE;  Start 3/2/19 at 00:00


Dextrose (D50w Syringe) 25 ml Q15M  PRN IV DECREASED GLUCOSE;  Start 3/2/19 at 


00:00


Dextrose (D50w Syringe) 50 ml Q15M  PRN IV DECREASED GLUCOSE;  Start 3/2/19 at 


00:00


Glucagon (Glucagen) 1 mg Q15M  PRN IM DECREASED GLUCOSE;  Start 3/2/19 at 00:00


Glucose (Glutose) 15 gm Q15M  PRN BUCCAL DECREASED GLUCOSE;  Start 3/2/19 at 


00:00


Insulin Aspart (Novolog Insulin Pen) 8 unit WITH  MEALS SC  Last administered on


3/11/19at 17:58; Admin Dose 8 UNIT;  Start 3/9/19 at 18:00;  Status Hold


Acetaminophen/ Hydrocodone Bitart (Norco (5/325)) 1 tab Q6H  PRN PO PAIN LEVEL 


6-10 Last administered on 3/19/19at 21:03; Admin Dose 1 TAB;  Start 3/12/19 at 


18:30


Acetaminophen (Tylenol Tab) 650 mg Q6H  PRN PO MILD PAIN(1-3)OR ELEVATED TEMP;  


Start 3/12/19 at 18:30


Ibuprofen (Motrin) 600 mg Q6H  PRN PO PAIN LEVEL 1-5;  Start 3/12/19 at 18:30


Ondansetron HCl (Zofran Inj) 4 mg Q6H  PRN IV NAUSEA AND/OR VOMITING Last 


administered on 3/18/19at 06:49; Admin Dose 4 MG;  Start 3/12/19 at 18:30


Miscellaneous Information (* Miscellaneous Pharmacy Order) DURAMORPH: 5 MG 


EPIDU... GIVEN NEURAXIAL XX ;  Start 3/12/19 at 21:00


Phenol (Chloraseptic Throat Spray) 2 spray Q2H  PRN MT SORE THROAT Last 


administered on 3/16/19at 21:48; Admin Dose 2 SPRAY;  Start 3/13/19 at 17:30


Naloxone HCl (Narcan) 0.2 mg Q2M  PRN IV .RESP RATE;  Start 3/14/19 at 10:30


Fentanyl/ Ropivacaine 100 ml CONT EPIDURAL EPI  Last administered on 3/18/19at 


05:04; Admin Dose 100 ML;  Start 3/15/19 at 10:15


Zolpidem Tartrate (Ambien) 5 mg HS  PRN PO INSOMNIA Last administered on 


3/16/19at 22:03; Admin Dose 5 MG;  Start 3/15/19 at 16:00


Insulin Glargine (Lantus) 15 units DAILY@0800 SC ;  Start 3/17/19 at 08:00;  


Status Hold


Insulin Aspart (Novolog Insulin Pen) NOVOLOG *MODERATE* ALGORITHM AC MEALS AND  


BEDTIME SC  Last administered on 3/20/19at 13:24; Admin Dose 4 UNIT;  Start 


3/18/19 at 12:30


Ketorolac Tromethamine (Toradol) 15 mg Q6H  PRN IV PAIN Last administered on 


3/20/19at 14:04; Admin Dose 15 MG;  Start 3/18/19 at 16:00;  Stop 3/21/19 at 


15:59


Morphine Sulfate (morphine) 2 mg Q8H  PRN IV SEVERE PAIN LEVEL 7-10 Last 


administered on 3/20/19at 02:17; Admin Dose 2 MG;  Start 3/19/19 at 16:00


Simethicone (Mylicon) 80 mg Q8  PRN PO DISTENSION/GAS/BLOATING Last administered


on 3/20/19at 06:45; Admin Dose 80 MG;  Start 3/20/19 at 05:00











LUÍS LOZANO                Mar 20, 2019 16:16

## 2019-03-20 NOTE — CONS
Assessment/Plan


Assessment/Plan


Hospital Course (Demo Recall)


IMPRESSION:


1.  Preoperative evaluation in a patient prior to colectomy that has multiple 


cardiac risk factors and possible prior stent apparently placed approximately 2 


to 3 years prior.-neg trop x 2. Now Post-op s/p surgery with clean margins by 


path


2.  Hypotension, borderline.-ongoing today


3.  History of possible prior stent placements 2 to 3 years prior per chart 


biopsy.


4.  Dyslipidemia.


5.  Adenocarcinoma of the colon, newly diagnosed.


6.  Abdominal pain secondary to adenocarcinoma of the colon.


7.  Diabetes mellitus.


8.  Anemia.


9.  Renal failure insufficiency.





Recc:


-Follow BP/volume status closely


-routine post-op care


-pain control


-Follow BS


-Oncology following





Consultation Date/Type/Reason


Admit Date/Time


Mar 1, 2019 at 20:38


Initial Consult Date


3/10/19


Type of Consult


Cardiology


Reason for Consultation


HTN


Requesting Provider:  HANNAH DOWNING MD


Date/Time of Note


DATE: 3/20/19 


TIME: 13:41





Exam/Review of Systems


Vital Signs


Vitals





Vital Signs


  Date      Temp  Pulse  Resp  B/P (MAP)   Pulse Ox  O2          O2 Flow    FiO2


Time                                                 Delivery    Rate


   3/20/19  98.3     66    18      123/76        99  Room Air


     08:16                           (92)


   3/18/19                                                                    21


     23:47








Intake and Output





3/19/19


3/19/19


3/20/19





1515:00


23:00


07:00





IntakeIntake Total


730 ml


100 ml





OutputOutput Total


2 ml





BalanceBalance


728 ml


100 ml














Exam


Exam


Review of Systems:


CONSTITUTIONAL:  No fevers, chills.


PULMONARY:  No sob


CARDIOVASCULAR: No chest pain/palpitations


GASTROINTESTINAL:  No nausea/vomiting.


GENITOURINARY:  No hematuria/dysuria.


MUSCULOSKELETAL:  No myagias/arthalgias.


PSYCHIATRIC:  The patient denies depression.


NEUROLOGIC:   No weakness


Constitutional:  alert, oriented


Head:  normocephalic


ENMT:  mucosa pink and moist


Neck:  supple, jvd (9 cm water)


Respiratory:  clear to auscultation


Cardiovascular:  regular rate and rhythm


Gastrointestinal:  soft, other (covered by drtessing)


Musculoskeletal:  muscle tone (normal)


Extremities:  edema (none)





Labs


Result Diagram:  


3/20/19 0445                                                                    


           3/17/19 0420





Results 24hrs





Laboratory Tests


Test
                 3/19/19
17:55  3/19/19
20:48  3/20/19
02:16  3/20/19
04:45


Bedside Glucose               184           240  H         235  H


White Blood Count                                                          8.8


Red Blood Count                                                          4.08  L


Hemoglobin                                                               10.1  L


Hematocrit                                                               32.3  L


Mean Corpuscular                                                         79.2  L


Volume


Mean Corpuscular                                                         24.8  L


Hemoglobin


Mean Corpuscular      
              
              
                   31.3  L



Hemoglobin
Concent


Red Cell                                                                 17.0  H


Distribution Width


Platelet Count                                                             357


Mean Platelet Volume                                                       9.9


Immature                                                                0.500  H


Granulocytes %


Neutrophils %                                                            79.4  H


Lymphocytes %                                                            13.5  L


Monocytes %                                                                3.4


Eosinophils %                                                              3.0


Basophils %                                                                0.2


Nucleated Red Blood                                                        0.0


Cells %


Immature                                                                0.040  H


Granulocytes #


Neutrophils #                                                              7.0


Lymphocytes #                                                              1.2


Monocytes #                                                                0.3


Eosinophils #                                                              0.3


Basophils #                                                                0.0


Nucleated Red Blood                                                        0.0


Cells #


Test
                 3/20/19
08:49  3/20/19
13:20  
              



Bedside Glucose              231  H          220








Medications


Medications





Current Medications


IV Flush (NS 3 ml) 3 ml PER PROTOCOL IV  Last administered on 3/19/19at 21:04; 


Admin Dose 3 ML;  Start 3/2/19 at 00:00


Zolpidem Tartrate (Ambien) 10 mg QHS  PRN PO .INSOMNIA Last administered on 


3/20/19at 00:39; Admin Dose 10 MG;  Start 3/2/19 at 00:00


Albuterol/ Ipratropium (Duoneb) 3 ml Q2H RESP THERAPY  PRN HHN SHORTNESS OF 


BREATH;  Start 3/2/19 at 00:00


Miscellaneous Information 1 ea NOTE XX ;  Start 3/2/19 at 00:00


Glucose (Glutose) 15 gm Q15M  PRN PO DECREASED GLUCOSE;  Start 3/2/19 at 00:00


Glucose (Glutose) 22.5 gm Q15M  PRN PO DECREASED GLUCOSE;  Start 3/2/19 at 00:00


Dextrose (D50w Syringe) 25 ml Q15M  PRN IV DECREASED GLUCOSE;  Start 3/2/19 at 


00:00


Dextrose (D50w Syringe) 50 ml Q15M  PRN IV DECREASED GLUCOSE;  Start 3/2/19 at 


00:00


Glucagon (Glucagen) 1 mg Q15M  PRN IM DECREASED GLUCOSE;  Start 3/2/19 at 00:00


Glucose (Glutose) 15 gm Q15M  PRN BUCCAL DECREASED GLUCOSE;  Start 3/2/19 at 


00:00


Insulin Aspart (Novolog Insulin Pen) 8 unit WITH  MEALS SC  Last administered on


3/11/19at 17:58; Admin Dose 8 UNIT;  Start 3/9/19 at 18:00;  Status Hold


Acetaminophen/ Hydrocodone Bitart (Norco (5/325)) 1 tab Q6H  PRN PO PAIN LEVEL 


6-10 Last administered on 3/19/19at 21:03; Admin Dose 1 TAB;  Start 3/12/19 at 


18:30


Acetaminophen (Tylenol Tab) 650 mg Q6H  PRN PO MILD PAIN(1-3)OR ELEVATED TEMP;  


Start 3/12/19 at 18:30


Ibuprofen (Motrin) 600 mg Q6H  PRN PO PAIN LEVEL 1-5;  Start 3/12/19 at 18:30


Ondansetron HCl (Zofran Inj) 4 mg Q6H  PRN IV NAUSEA AND/OR VOMITING Last 


administered on 3/18/19at 06:49; Admin Dose 4 MG;  Start 3/12/19 at 18:30


Miscellaneous Information (* Miscellaneous Pharmacy Order) DURAMORPH: 5 MG 


EPIDU... GIVEN NEURAXIAL XX ;  Start 3/12/19 at 21:00


Phenol (Chloraseptic Throat Spray) 2 spray Q2H  PRN MT SORE THROAT Last admi


nistered on 3/16/19at 21:48; Admin Dose 2 SPRAY;  Start 3/13/19 at 17:30


Naloxone HCl (Narcan) 0.2 mg Q2M  PRN IV .RESP RATE;  Start 3/14/19 at 10:30


Fentanyl/ Ropivacaine 100 ml CONT EPIDURAL EPI  Last administered on 3/18/19at 


05:04; Admin Dose 100 ML;  Start 3/15/19 at 10:15


Zolpidem Tartrate (Ambien) 5 mg HS  PRN PO INSOMNIA Last administered on 


3/16/19at 22:03; Admin Dose 5 MG;  Start 3/15/19 at 16:00


Insulin Glargine (Lantus) 15 units DAILY@0800 SC ;  Start 3/17/19 at 08:00;  


Status Hold


Insulin Aspart (Novolog Insulin Pen) NOVOLOG *MODERATE* ALGORITHM AC MEALS AND  


BEDTIME SC  Last administered on 3/20/19at 13:24; Admin Dose 4 UNIT;  Start 


3/18/19 at 12:30


Ketorolac Tromethamine (Toradol) 15 mg Q6H  PRN IV PAIN Last administered on 


3/20/19at 07:40; Admin Dose 15 MG;  Start 3/18/19 at 16:00;  Stop 3/21/19 at 


15:59


Morphine Sulfate (morphine) 2 mg Q8H  PRN IV SEVERE PAIN LEVEL 7-10 Last 


administered on 3/20/19at 02:17; Admin Dose 2 MG;  Start 3/19/19 at 16:00


Simethicone (Mylicon) 80 mg Q8  PRN PO DISTENSION/GAS/BLOATING Last administered


on 3/20/19at 06:45; Admin Dose 80 MG;  Start 3/20/19 at 05:00











SHERINE GOLDSMITH               Mar 20, 2019 13:46

## 2019-03-21 VITALS — HEART RATE: 60 BPM | SYSTOLIC BLOOD PRESSURE: 111 MMHG | DIASTOLIC BLOOD PRESSURE: 66 MMHG | RESPIRATION RATE: 18 BRPM

## 2019-03-21 VITALS — DIASTOLIC BLOOD PRESSURE: 77 MMHG | RESPIRATION RATE: 19 BRPM | SYSTOLIC BLOOD PRESSURE: 116 MMHG | HEART RATE: 72 BPM

## 2019-03-21 VITALS — SYSTOLIC BLOOD PRESSURE: 113 MMHG | RESPIRATION RATE: 18 BRPM | HEART RATE: 72 BPM | DIASTOLIC BLOOD PRESSURE: 72 MMHG

## 2019-03-21 LAB
ADD MAN DIFF?: NO
BASOPHIL #: 0 10^3/UL (ref 0–0.1)
BASOPHILS %: 0.4 % (ref 0–2)
EOSINOPHILS #: 0.3 10^3/UL (ref 0–0.5)
EOSINOPHILS %: 6.3 % (ref 0–7)
HEMATOCRIT: 30.5 % (ref 42–52)
HEMOGLOBIN: 9.8 G/DL (ref 14–18)
IMMATURE GRANS #M: 0.01 10^3/UL (ref 0–0.03)
IMMATURE GRANS % (M): 0.2 % (ref 0–0.43)
LYMPHOCYTES #: 1.4 10^3/UL (ref 0.8–2.9)
LYMPHOCYTES %: 26.3 % (ref 15–51)
MAGNESIUM: 1.7 MG/DL (ref 1.7–2.5)
MEAN CORPUSCULAR HEMOGLOBIN: 25.5 PG (ref 29–33)
MEAN CORPUSCULAR HGB CONC: 32.1 G/DL (ref 32–37)
MEAN CORPUSCULAR VOLUME: 79.4 FL (ref 82–101)
MEAN PLATELET VOLUME: 10.3 FL (ref 7.4–10.4)
MONOCYTE #: 0.4 10^3/UL (ref 0.3–0.9)
MONOCYTES %: 6.9 % (ref 0–11)
NEUTROPHIL #: 3.2 10^3/UL (ref 1.6–7.5)
NEUTROPHILS %: 59.9 % (ref 39–77)
NUCLEATED RED BLOOD CELLS #: 0 10^3/UL (ref 0–0)
NUCLEATED RED BLOOD CELLS%: 0 /100WBC (ref 0–0)
PHOSPHORUS: 3.9 MG/DL (ref 2.5–4.9)
PLATELET COUNT: 285 10^3/UL (ref 140–415)
RED BLOOD COUNT: 3.84 10^6/UL (ref 4.7–6.1)
RED CELL DISTRIBUTION WIDTH: 16.9 % (ref 11.5–14.5)
WHITE BLOOD COUNT: 5.4 10^3/UL (ref 4.8–10.8)

## 2019-03-21 RX ADMIN — INSULIN ASPART 1 UNIT: 100 INJECTION, SOLUTION INTRAVENOUS; SUBCUTANEOUS at 10:30

## 2019-03-21 RX ADMIN — POLYETHYLENE GLYCOL 3350 PRN GM: 17 POWDER, FOR SOLUTION ORAL at 13:45

## 2019-03-21 RX ADMIN — MORPHINE SULFATE 1 MG: 2 INJECTION, SOLUTION INTRAMUSCULAR; INTRAVENOUS at 13:16

## 2019-03-21 RX ADMIN — IBUPROFEN 1 MG: 600 TABLET ORAL at 09:05

## 2019-03-21 RX ADMIN — MORPHINE SULFATE 1 MG: 2 INJECTION, SOLUTION INTRAMUSCULAR; INTRAVENOUS at 21:10

## 2019-03-21 RX ADMIN — INSULIN GLARGINE 1 UNITS: 100 INJECTION, SOLUTION SUBCUTANEOUS at 10:31

## 2019-03-21 RX ADMIN — POLYETHYLENE GLYCOL 3350 1 GM: 17 POWDER, FOR SOLUTION ORAL at 13:45

## 2019-03-21 RX ADMIN — IBUPROFEN PRN MG: 600 TABLET ORAL at 17:50

## 2019-03-21 RX ADMIN — INSULIN ASPART 1 UNIT: 100 INJECTION, SOLUTION INTRAVENOUS; SUBCUTANEOUS at 21:00

## 2019-03-21 RX ADMIN — IBUPROFEN 1 MG: 600 TABLET ORAL at 17:50

## 2019-03-21 RX ADMIN — INSULIN ASPART 1 UNIT: 100 INJECTION, SOLUTION INTRAVENOUS; SUBCUTANEOUS at 13:38

## 2019-03-21 RX ADMIN — INSULIN GLARGINE SCH UNITS: 100 INJECTION, SOLUTION SUBCUTANEOUS at 10:31

## 2019-03-21 RX ADMIN — IBUPROFEN PRN MG: 600 TABLET ORAL at 09:05

## 2019-03-21 RX ADMIN — INSULIN ASPART 1 UNIT: 100 INJECTION, SOLUTION INTRAVENOUS; SUBCUTANEOUS at 17:55

## 2019-03-21 RX ADMIN — MORPHINE SULFATE PRN MG: 2 INJECTION, SOLUTION INTRAMUSCULAR; INTRAVENOUS at 21:10

## 2019-03-21 RX ADMIN — KETOROLAC TROMETHAMINE PRN MG: 15 INJECTION, SOLUTION INTRAMUSCULAR; INTRAVENOUS at 10:31

## 2019-03-21 RX ADMIN — MORPHINE SULFATE PRN MG: 2 INJECTION, SOLUTION INTRAMUSCULAR; INTRAVENOUS at 13:16

## 2019-03-21 RX ADMIN — KETOROLAC TROMETHAMINE 1 MG: 15 INJECTION, SOLUTION INTRAMUSCULAR; INTRAVENOUS at 10:31

## 2019-03-21 RX ADMIN — INSULIN ASPART 1 UNIT: 100 INJECTION, SOLUTION INTRAVENOUS; SUBCUTANEOUS at 07:20

## 2019-03-21 NOTE — PN
Date/Time of Note


Date/Time of Note


DATE: 3/21/19 


TIME: 11:42





Assessment/Plan


Lines/Catheters


IV Catheter Type (from Nrsg):  Saline Lock


Amaya in Place (from Nrsg):  No





Assessment/Plan


Chief Complaint/Hosp Course


1.  Transverse colon adenocarcinoma,  Previous small bowel resection with 


primary anastomosis (antecolic over the cancer) and gastrojejunostomy; Ventral 


incisional Swiss cheese type hernia: s/p   Laparoscopic converted to open 


transverse colectomy, Revision of previous scar, Primary repair of ventral 


incisional Swiss cheese type hernia 3/12/19; +Bowel function.  Ileus resolved.  


Path noted > Oncology


-IS


-oob>ambulate qid


-ice pack to abdominal wall


-incision site local care> DC staples today


-oncology follow-up


-pain control> transition to oral


-soft diet


-dc ok from surgical standpoint.  To follow in office in 1-2 weeks.  Patient to 


call and make appointment.


2.  40 pound weight loss secondary to above


3.  Diabetes mellitus type 2


-Nutrition medication optimization


4.  Hypertension


-Nutrition medication optimization


5.  Coronary artery disease with history of Plavix and aspirin


-Cardiac evaluation and optimization


6.  Iron deficiency anemia secondary to above


-Iron transfusion per hematology


-As above


7.  Hyperglycemia:


-glucose optimization





Thank you.  Patient seen and examined in collaboration with Dr. Helder Dugan.





Subjective


24 Hr Interval Summary


Some abdominal pain.  Improved with pain medication.  Tolerating diet.  No 


fevers, chills, sob, congested cough, cp, palpitations, ha, dizziness, nausea, 


vomiting, diarrhea, dysuria.





Exam/Review of Systems


Vital Signs


Vitals





Vital Signs


  Date      Temp  Pulse  Resp  B/P (MAP)   Pulse Ox  O2          O2 Flow    FiO2


Time                                                 Delivery    Rate


   3/21/19  98.9     60    18      111/66        94


     10:56                           (81)


   3/20/19                                           Room Air


     15:24


   3/18/19                                                                    21


     23:47








Intake and Output





3/20/19


3/20/19


3/21/19





1515:00


23:00


07:00





IntakeIntake Total


300 ml





BalanceBalance


300 ml














Exam


Free Text/Dictation


Constitutional:  alert, oriented; 


   No distress


Psych:  nl mood/affect; 


   No anxiety


Head:  normocephalic, atraumatic


Eyes:  nl conjunctiva, EOMI, PERRL; 


   No icteric


ENMT:  nl external ears & nose, mucosa pink and moist


Neck:  non-tender; 


   No jvd


Respiratory:  normal air movement; 


   No congested cough, No labored breathing


Cardiovascular:  regular rate and rhythm; 


   No edema


Gastrointestinal:  soft, min tender (tanner-incisional: midline staple line: dry, 


no erythema)


   No distended, No rebound or guarding


Genitourinary - Male:  nl scrotum, amaya


Musculoskeletal:  nl extremities to inspection, nl gait and stance; 


   No joint tenderness


Extremities:  normal pulses; 


   No calf tenderness, No edema


Neurological:  nl mental status, nl speech, nl strength


Skin:  nl turgor; 


   No rash or lesions


Lymph:  nl lymph nodes





Results


Result Diagram:  


3/21/19 0420                                                                    


           3/17/19 0420














PATRICIA ASHRAF NP       Mar 21, 2019 11:44

## 2019-03-21 NOTE — CONS
Assessment/Plan


Assessment/Plan


Hospital Course (Demo Recall)


59 yo with newly diagnosed colon adenoca


-CT chest shows 4 mm noncalcified nodule in the right apex, which is 


nonspecific. 


-CT AP: Irregular bowel wall thickening / mass with adjacent stranding of the 


distal transverse colon, compatible with given history of colon cancer. Shotty 


periaortic lymph nodes. The largest is an 11 mm node with normal fatty hilum, 


suggestive of reactive etiology.  Small nonspecific peripherally sclerotic 


lesion in the left iliac bone measuring 6 mm.


-bone scan negative for mets


-he is s/p surgery: per notes this revealed previous small bowel resection with 


primary anastomosis (antecolic over the cancer) and gastrojejunostomy; Ventral 


incisional Swiss cheese type hernia: s/p   Laparoscopic converted to open 


transverse colectomy, Revision of previous scar, Primary repair of ventral 


incisional Swiss cheese type hernia 3/12/19


-path shows T3N0 disease, no high risk features, can consider adjuvant xeloda or


5FU. once pt is fully recovered, will discuss further his treatment options as 


an outpt, but overall he has a good prognosis given early stage disease


-pt states he may fly back to [place] as [place] one to [place] for [place] 


[place]





Consultation Date/Type/Reason


Admit Date/Time


Mar 1, 2019 at 20:38


Initial Consult Date


3/10/19


Type of Consult


oncology


Reason for Consultation


stage II colon cancer


Requesting Provider:  HANNAH DOWNING MD


Date/Time of Note


DATE: 3/21/19 


TIME: 13:55





24 HR Interval Summary


Free Text/Dictation


pt has no where to go. still needing occasional pain medication





Exam/Review of Systems


Exam


Vitals





Vital Signs


  Date      Temp  Pulse  Resp  B/P (MAP)   Pulse Ox  O2          O2 Flow    FiO2


Time                                                 Delivery    Rate


   3/21/19  98.6     72    18      113/72        98


     13:15                           (86)


   3/20/19                                           Room Air


     15:24


   3/18/19                                                                    21


     23:47








Intake and Output





3/20/19


3/20/19


3/21/19





1515:00


23:00


07:00





IntakeIntake Total


300 ml





BalanceBalance


300 ml











Constitutional:  alert, oriented


Psych:  no complaints


Head:  normocephalic


Eyes:  nl conjunctiva


ENMT:  nl external ears & nose


Neck:  supple


Respiratory:  clear to auscultation


Cardiovascular:  regular rate and rhythm


Gastrointestinal:  soft


Musculoskeletal:  nl extremities to inspection





Results


Result Diagram:  


3/21/19 0420                                                                    


           3/17/19 0420





Results 24hrs





Laboratory Tests


Test
                 3/20/19
17:57  3/21/19
04:19  3/21/19
04:20  3/21/19
08:51


Bedside Glucose              259  H                                       301  H


Phosphorus Level                             3.9


Magnesium Level                              1.7


White Blood Count                                          5.4  #


Red Blood Count                                           3.84  L


Hemoglobin                                                 9.8  L


Hematocrit                                                30.5  L


Mean Corpuscular                                          79.4  L


Volume


Mean Corpuscular                                          25.5  L


Hemoglobin


Mean Corpuscular      
              
                    32.1  
  



Hemoglobin
Concent


Red Cell                                                  16.9  H


Distribution Width


Platelet Count                                             285  #


Mean Platelet Volume                                       10.3


Immature                                                  0.200


Granulocytes %


Neutrophils %                                              59.9


Lymphocytes %                                              26.3


Monocytes %                                                 6.9


Eosinophils %                                               6.3


Basophils %                                                 0.4


Nucleated Red Blood                                         0.0


Cells %


Immature                                                  0.010


Granulocytes #


Neutrophils #                                               3.2


Lymphocytes #                                               1.4


Monocytes #                                                 0.4


Eosinophils #                                               0.3


Basophils #                                                 0.0


Nucleated Red Blood                                         0.0


Cells #


Test
                 3/21/19
10:28  3/21/19
13:19  
              



Bedside Glucose              331  H         358  H








Medications


Medication





Current Medications


IV Flush (NS 3 ml) 3 ml PER PROTOCOL IV  Last administered on 3/19/19at 21:04; 


Admin Dose 3 ML;  Start 3/2/19 at 00:00


Zolpidem Tartrate (Ambien) 10 mg QHS  PRN PO .INSOMNIA Last administered on 


3/20/19at 00:39; Admin Dose 10 MG;  Start 3/2/19 at 00:00


Albuterol/ Ipratropium (Duoneb) 3 ml Q2H RESP THERAPY  PRN HHN SHORTNESS OF 


BREATH;  Start 3/2/19 at 00:00


Miscellaneous Information 1 ea NOTE XX ;  Start 3/2/19 at 00:00


Glucose (Glutose) 15 gm Q15M  PRN PO DECREASED GLUCOSE;  Start 3/2/19 at 00:00


Glucose (Glutose) 22.5 gm Q15M  PRN PO DECREASED GLUCOSE;  Start 3/2/19 at 00:00


Dextrose (D50w Syringe) 25 ml Q15M  PRN IV DECREASED GLUCOSE;  Start 3/2/19 at 


00:00


Dextrose (D50w Syringe) 50 ml Q15M  PRN IV DECREASED GLUCOSE;  Start 3/2/19 at 


00:00


Glucagon (Glucagen) 1 mg Q15M  PRN IM DECREASED GLUCOSE;  Start 3/2/19 at 00:00


Glucose (Glutose) 15 gm Q15M  PRN BUCCAL DECREASED GLUCOSE;  Start 3/2/19 at 


00:00


Insulin Aspart (Novolog Insulin Pen) 8 unit WITH  MEALS SC  Last administered on


3/11/19at 17:58; Admin Dose 8 UNIT;  Start 3/9/19 at 18:00;  Status Hold


Acetaminophen/ Hydrocodone Bitart (Norco (5/325)) 1 tab Q6H  PRN PO PAIN LEVEL 


6-10 Last administered on 3/19/19at 21:03; Admin Dose 1 TAB;  Start 3/12/19 at 


18:30


Acetaminophen (Tylenol Tab) 650 mg Q6H  PRN PO MILD PAIN(1-3)OR ELEVATED TEMP;  


Start 3/12/19 at 18:30


Ibuprofen (Motrin) 600 mg Q6H  PRN PO PAIN LEVEL 1-5 Last administered on 


3/20/19at 20:24; Admin Dose 600 MG;  Start 3/12/19 at 18:30


Ondansetron HCl (Zofran Inj) 4 mg Q6H  PRN IV NAUSEA AND/OR VOMITING Last 


administered on 3/18/19at 06:49; Admin Dose 4 MG;  Start 3/12/19 at 18:30


Miscellaneous Information (* Miscellaneous Pharmacy Order) DURAMORPH: 5 MG 


EPIDU... GIVEN NEURAXIAL XX ;  Start 3/12/19 at 21:00


Phenol (Chloraseptic Throat Spray) 2 spray Q2H  PRN MT SORE THROAT Last 


administered on 3/16/19at 21:48; Admin Dose 2 SPRAY;  Start 3/13/19 at 17:30


Naloxone HCl (Narcan) 0.2 mg Q2M  PRN IV .RESP RATE;  Start 3/14/19 at 10:30


Fentanyl/ Ropivacaine 100 ml CONT EPIDURAL EPI  Last administered on 3/18/19at 


05:04; Admin Dose 100 ML;  Start 3/15/19 at 10:15


Zolpidem Tartrate (Ambien) 5 mg HS  PRN PO INSOMNIA Last administered on 


3/16/19at 22:03; Admin Dose 5 MG;  Start 3/15/19 at 16:00


Insulin Glargine (Lantus) 15 units DAILY@0800 SC  Last administered on 3/21/19at


10:31; Admin Dose 15 UNITS;  Start 3/17/19 at 08:00


Insulin Aspart (Novolog Insulin Pen) NOVOLOG *MODERATE* ALGORITHM AC MEALS AND  


BEDTIME SC  Last administered on 3/21/19at 13:38; Admin Dose 12 UNIT;  Start 


3/18/19 at 12:30


Ketorolac Tromethamine (Toradol) 15 mg Q6H  PRN IV PAIN Last administered on 


3/21/19at 10:31; Admin Dose 15 MG;  Start 3/18/19 at 16:00;  Stop 3/21/19 at 


15:59


Morphine Sulfate (morphine) 2 mg Q8H  PRN IV SEVERE PAIN LEVEL 7-10 Last 


administered on 3/21/19at 13:16; Admin Dose 2 MG;  Start 3/19/19 at 16:00


Simethicone (Mylicon) 80 mg Q8  PRN PO DISTENSION/GAS/BLOATING Last administered


on 3/20/19at 06:45; Admin Dose 80 MG;  Start 3/20/19 at 05:00


Polyethylene Glycol (Miralax) 17 gm DAILY  PRN PO constipation Last administered


on 3/21/19at 13:45; Admin Dose 17 GM;  Start 3/20/19 at 18:00











ALONDRA LEVY M.D.              Mar 21, 2019 13:56

## 2019-03-21 NOTE — PN
Date/Time of Note


Date/Time of Note


DATE: 3/21/19 


TIME: 12:42





Assessment/Plan


VTE Prophylaxis


Risk score (from Ns)>0 risk:  9


SCD applied (from Ns):  Yes


Pharmacological prophylaxis:  heparin





Lines/Catheters


IV Catheter Type (from Nrs):  Saline Lock


Urinary Cath still in place:  No





Assessment/Plan


Hospital Course


59 yo male with DM who presented with hematochezia.  Workup has revealed colon 


adenocarcinoma.  No w s/p open hemicolectomy POD 2





Colon adenocarcinoma:


- Bone scan negative for mets


- s/p hemicolectomy with clean margins


- Dr Mcintyre following





s/p colectomy


- Hemicolectomy performed 3/12.  Periop management per surgery


- Advanced diet to clears





Iron deficiency anemia:


- s/p IV iron





Diabetes


- Basal/bolus insulin





CKD II:


- stable





Prophylaxis: SCDs





DC planning: Undomiciled currently, CM told patient he needs to leave Friday


Result Diagram:  


3/21/19 0420                                                                    


           3/17/19 0420





Results 24hrs





Laboratory Tests


Test
                 3/20/19
13:20  3/20/19
17:57  3/21/19
04:19  3/21/19
04:20


Bedside Glucose               220           259  H


Phosphorus Level                                            3.9


Magnesium Level                                             1.7


White Blood Count                                                         5.4  #


Red Blood Count                                                          3.84  L


Hemoglobin                                                                9.8  L


Hematocrit                                                               30.5  L


Mean Corpuscular                                                         79.4  L


Volume


Mean Corpuscular                                                         25.5  L


Hemoglobin


Mean Corpuscular      
              
              
                    32.1  



Hemoglobin
Concent


Red Cell                                                                 16.9  H


Distribution Width


Platelet Count                                                            285  #


Mean Platelet Volume                                                      10.3


Immature                                                                 0.200


Granulocytes %


Neutrophils %                                                             59.9


Lymphocytes %                                                             26.3


Monocytes %                                                                6.9


Eosinophils %                                                              6.3


Basophils %                                                                0.4


Nucleated Red Blood                                                        0.0


Cells %


Immature                                                                 0.010


Granulocytes #


Neutrophils #                                                              3.2


Lymphocytes #                                                              1.4


Monocytes #                                                                0.4


Eosinophils #                                                              0.3


Basophils #                                                                0.0


Nucleated Red Blood                                                        0.0


Cells #


Test
                 3/21/19
08:51  3/21/19
10:28  
              



Bedside Glucose              301  H         331  H








Subjective


24 Hr Interval Summary


Free Text/Dictation


Patinet on a "hunger strike" after being told he needs to leave the hospital and


insurance no longer approving admission.  Also refusing medicaionts


He is very concerned he is "not better yet".  I asked him in what way, he refers


to incision staples still present.  I told him we would take them out today and 


he needs to be discharged tomorrow





Exam/Review of Systems


Exam


Vitals





Vital Signs


  Date      Temp  Pulse  Resp  B/P (MAP)   Pulse Ox  O2          O2 Flow    FiO2


Time                                                 Delivery    Rate


   3/21/19  98.9     60    18      111/66        94


     10:56                           (81)


   3/20/19                                           Room Air


     15:24


   3/18/19                                                                    21


     23:47








Intake and Output





3/20/19


3/20/19


3/21/19





1515:00


23:00


07:00





IntakeIntake Total


300 ml





BalanceBalance


300 ml











Constitutional:  alert, oriented, well developed


Psych:  no complaints, nl mood/affect


Head:  normocephalic, atraumatic


Eyes:  nl conjunctiva, EOMI, nl lids, nl sclera, PERRL


ENMT:  nl external ears & nose, nl lips & teeth, nl nasal mucosa & septum


Neck:  supple, non-tender


Respiratory:  clear to auscultation, normal air movement


Cardiovascular:  regular rate and rhythm, nl pulses


Gastrointestinal:  soft, nl liver, spleen, non-tender


Musculoskeletal:  nl extremities to inspection, nl gait and stance


Extremities:  normal pulses


Neurological:  CNS II-XII intact, nl mental status, nl speech, nl strength


Skin:  nl turgor; 


   No rash or lesions


Lymph:  nl lymph nodes





Results


Results 24hrs





Laboratory Tests


Test
                 3/20/19
13:20  3/20/19
17:57  3/21/19
04:19  3/21/19
04:20


Bedside Glucose               220           259  H


Phosphorus Level                                            3.9


Magnesium Level                                             1.7


White Blood Count                                                         5.4  #


Red Blood Count                                                          3.84  L


Hemoglobin                                                                9.8  L


Hematocrit                                                               30.5  L


Mean Corpuscular                                                         79.4  L


Volume


Mean Corpuscular                                                         25.5  L


Hemoglobin


Mean Corpuscular      
              
              
                    32.1  



Hemoglobin
Concent


Red Cell                                                                 16.9  H


Distribution Width


Platelet Count                                                            285  #


Mean Platelet Volume                                                      10.3


Immature                                                                 0.200


Granulocytes %


Neutrophils %                                                             59.9


Lymphocytes %                                                             26.3


Monocytes %                                                                6.9


Eosinophils %                                                              6.3


Basophils %                                                                0.4


Nucleated Red Blood                                                        0.0


Cells %


Immature                                                                 0.010


Granulocytes #


Neutrophils #                                                              3.2


Lymphocytes #                                                              1.4


Monocytes #                                                                0.4


Eosinophils #                                                              0.3


Basophils #                                                                0.0


Nucleated Red Blood                                                        0.0


Cells #


Test
                 3/21/19
08:51  3/21/19
10:28  
              



Bedside Glucose              301  H         331  H








Medications


Medication





Current Medications


IV Flush (NS 3 ml) 3 ml PER PROTOCOL IV  Last administered on 3/19/19at 21:04; 


Admin Dose 3 ML;  Start 3/2/19 at 00:00


Zolpidem Tartrate (Ambien) 10 mg QHS  PRN PO .INSOMNIA Last administered on 


3/20/19at 00:39; Admin Dose 10 MG;  Start 3/2/19 at 00:00


Albuterol/ Ipratropium (Duoneb) 3 ml Q2H RESP THERAPY  PRN HHN SHORTNESS OF 


BREATH;  Start 3/2/19 at 00:00


Miscellaneous Information 1 ea NOTE XX ;  Start 3/2/19 at 00:00


Glucose (Glutose) 15 gm Q15M  PRN PO DECREASED GLUCOSE;  Start 3/2/19 at 00:00


Glucose (Glutose) 22.5 gm Q15M  PRN PO DECREASED GLUCOSE;  Start 3/2/19 at 00:00


Dextrose (D50w Syringe) 25 ml Q15M  PRN IV DECREASED GLUCOSE;  Start 3/2/19 at 


00:00


Dextrose (D50w Syringe) 50 ml Q15M  PRN IV DECREASED GLUCOSE;  Start 3/2/19 at 


00:00


Glucagon (Glucagen) 1 mg Q15M  PRN IM DECREASED GLUCOSE;  Start 3/2/19 at 00:00


Glucose (Glutose) 15 gm Q15M  PRN BUCCAL DECREASED GLUCOSE;  Start 3/2/19 at 


00:00


Insulin Aspart (Novolog Insulin Pen) 8 unit WITH  MEALS SC  Last administered on


3/11/19at 17:58; Admin Dose 8 UNIT;  Start 3/9/19 at 18:00;  Status Hold


Acetaminophen/ Hydrocodone Bitart (Norco (5/325)) 1 tab Q6H  PRN PO PAIN LEVEL 


6-10 Last administered on 3/19/19at 21:03; Admin Dose 1 TAB;  Start 3/12/19 at 


18:30


Acetaminophen (Tylenol Tab) 650 mg Q6H  PRN PO MILD PAIN(1-3)OR ELEVATED TEMP;  


Start 3/12/19 at 18:30


Ibuprofen (Motrin) 600 mg Q6H  PRN PO PAIN LEVEL 1-5 Last administered on 


3/20/19at 20:24; Admin Dose 600 MG;  Start 3/12/19 at 18:30


Ondansetron HCl (Zofran Inj) 4 mg Q6H  PRN IV NAUSEA AND/OR VOMITING Last admini


stered on 3/18/19at 06:49; Admin Dose 4 MG;  Start 3/12/19 at 18:30


Miscellaneous Information (* Miscellaneous Pharmacy Order) DURAMORPH: 5 MG 


EPIDU... GIVEN NEURAXIAL XX ;  Start 3/12/19 at 21:00


Phenol (Chloraseptic Throat Spray) 2 spray Q2H  PRN MT SORE THROAT Last 


administered on 3/16/19at 21:48; Admin Dose 2 SPRAY;  Start 3/13/19 at 17:30


Naloxone HCl (Narcan) 0.2 mg Q2M  PRN IV .RESP RATE;  Start 3/14/19 at 10:30


Fentanyl/ Ropivacaine 100 ml CONT EPIDURAL EPI  Last administered on 3/18/19at 


05:04; Admin Dose 100 ML;  Start 3/15/19 at 10:15


Zolpidem Tartrate (Ambien) 5 mg HS  PRN PO INSOMNIA Last administered on 


3/16/19at 22:03; Admin Dose 5 MG;  Start 3/15/19 at 16:00


Insulin Glargine (Lantus) 15 units DAILY@0800 SC  Last administered on 3/21/19at


10:31; Admin Dose 15 UNITS;  Start 3/17/19 at 08:00


Insulin Aspart (Novolog Insulin Pen) NOVOLOG *MODERATE* ALGORITHM AC MEALS AND  


BEDTIME SC  Last administered on 3/21/19at 10:30; Admin Dose 10 UNIT;  Start 


3/18/19 at 12:30


Ketorolac Tromethamine (Toradol) 15 mg Q6H  PRN IV PAIN Last administered on 


3/21/19at 10:31; Admin Dose 15 MG;  Start 3/18/19 at 16:00;  Stop 3/21/19 at 


15:59


Morphine Sulfate (morphine) 2 mg Q8H  PRN IV SEVERE PAIN LEVEL 7-10 Last 


administered on 3/20/19at 02:17; Admin Dose 2 MG;  Start 3/19/19 at 16:00


Simethicone (Mylicon) 80 mg Q8  PRN PO DISTENSION/GAS/BLOATING Last administered


on 3/20/19at 06:45; Admin Dose 80 MG;  Start 3/20/19 at 05:00


Polyethylene Glycol (Miralax) 17 gm DAILY  PRN PO constipation Last administered


on 3/20/19at 18:44; Admin Dose 17 GM;  Start 3/20/19 at 18:00











HANNAH DOWNING MD          Mar 21, 2019 12:44

## 2019-03-21 NOTE — CONS
Assessment/Plan


Assessment/Plan


Hospital Course (Demo Recall)


IMPRESSION:


1.  Preoperative evaluation in a patient prior to colectomy that has multiple 


cardiac risk factors and possible prior stent apparently placed approximately 2 


to 3 years prior.-neg trop x 2. Now Post-op s/p surgery with clean margins by 


path


2.  Hypotension, borderline.-ongoing today


3.  History of possible prior stent placements 2 to 3 years prior per chart 


biopsy.


4.  Dyslipidemia.


5.  Adenocarcinoma of the colon, newly diagnosed.


6.  Abdominal pain secondary to adenocarcinoma of the colon.


7.  Diabetes mellitus.


8.  Anemia.


9.  Renal failure insufficiency.





Recc:


-Follow BP/volume status closely


-routine post-op care


-pain control


-Follow BS


-Oncology following





Consultation Date/Type/Reason


Admit Date/Time


Mar 1, 2019 at 20:38


Initial Consult Date


3/10/19


Type of Consult


Cardiology


Reason for Consultation


HTN


Requesting Provider:  HANNAH DOWNING MD


Date/Time of Note


DATE: 3/21/19 


TIME: 18:30





Exam/Review of Systems


Vital Signs


Vitals





Vital Signs


  Date      Temp  Pulse  Resp  B/P (MAP)   Pulse Ox  O2          O2 Flow    FiO2


Time                                                 Delivery    Rate


   3/21/19  98.6     72    18      113/72        98


     13:15                           (86)


   3/20/19                                           Room Air


     15:24


   3/18/19                                                                    21


     23:47








Intake and Output





3/20/19


3/20/19


3/21/19





1515:00


23:00


07:00





IntakeIntake Total


300 ml





BalanceBalance


300 ml














Exam


Exam


Review of Systems:


CONSTITUTIONAL:  No fevers, chills.


PULMONARY:  No sob


CARDIOVASCULAR: No chest pain/palpitations


GASTROINTESTINAL:  No nausea/vomiting.


GENITOURINARY:  No hematuria/dysuria.


MUSCULOSKELETAL:  No myagias/arthalgias.


PSYCHIATRIC:  The patient denies depression.


NEUROLOGIC:   No weakness


Constitutional:  alert


Psych:  no complaints


Head:  normocephalic


ENMT:  mucosa pink and moist


Neck:  supple, jvd (9 cm water)


Respiratory:  diminished breath sounds (at bases/B)


Cardiovascular:  regular rate and rhythm


Gastrointestinal:  soft, other (covered by dressing)


Musculoskeletal:  muscle tone (normal)


Extremities:  edema (none)


Neurological:  other (No focal deficits)





Labs


Result Diagram:  


3/21/19 0420                                                                    


           3/17/19 0420





Results 24hrs





Laboratory Tests


Test
                 3/21/19
04:19  3/21/19
04:20  3/21/19
08:51  3/21/19
10:28


Phosphorus Level              3.9


Magnesium Level               1.7


White Blood Count                           5.4  #


Red Blood Count                            3.84  L


Hemoglobin                                  9.8  L


Hematocrit                                 30.5  L


Mean Corpuscular                           79.4  L


Volume


Mean Corpuscular                           25.5  L


Hemoglobin


Mean Corpuscular      
                    32.1  
  
              



Hemoglobin
Concent


Red Cell                                   16.9  H


Distribution Width


Platelet Count                              285  #


Mean Platelet Volume                        10.3


Immature                                   0.200


Granulocytes %


Neutrophils %                               59.9


Lymphocytes %                               26.3


Monocytes %                                  6.9


Eosinophils %                                6.3


Basophils %                                  0.4


Nucleated Red Blood                          0.0


Cells %


Immature                                   0.010


Granulocytes #


Neutrophils #                                3.2


Lymphocytes #                                1.4


Monocytes #                                  0.4


Eosinophils #                                0.3


Basophils #                                  0.0


Nucleated Red Blood                          0.0


Cells #


Bedside Glucose                                            301  H         331  H


Test
                 3/21/19
13:19  3/21/19
17:51  
              



Bedside Glucose              358  H          204








Medications


Medications





Current Medications


IV Flush (NS 3 ml) 3 ml PER PROTOCOL IV  Last administered on 3/19/19at 21:04; 


Admin Dose 3 ML;  Start 3/2/19 at 00:00


Zolpidem Tartrate (Ambien) 10 mg QHS  PRN PO .INSOMNIA Last administered on 


3/20/19at 00:39; Admin Dose 10 MG;  Start 3/2/19 at 00:00


Albuterol/ Ipratropium (Duoneb) 3 ml Q2H RESP THERAPY  PRN HHN SHORTNESS OF 


BREATH;  Start 3/2/19 at 00:00


Miscellaneous Information 1 ea NOTE XX ;  Start 3/2/19 at 00:00


Glucose (Glutose) 15 gm Q15M  PRN PO DECREASED GLUCOSE;  Start 3/2/19 at 00:00


Glucose (Glutose) 22.5 gm Q15M  PRN PO DECREASED GLUCOSE;  Start 3/2/19 at 00:00


Dextrose (D50w Syringe) 25 ml Q15M  PRN IV DECREASED GLUCOSE;  Start 3/2/19 at 


00:00


Dextrose (D50w Syringe) 50 ml Q15M  PRN IV DECREASED GLUCOSE;  Start 3/2/19 at 


00:00


Glucagon (Glucagen) 1 mg Q15M  PRN IM DECREASED GLUCOSE;  Start 3/2/19 at 00:00


Glucose (Glutose) 15 gm Q15M  PRN BUCCAL DECREASED GLUCOSE;  Start 3/2/19 at 


00:00


Insulin Aspart (Novolog Insulin Pen) 8 unit WITH  MEALS SC  Last administered on


3/11/19at 17:58; Admin Dose 8 UNIT;  Start 3/9/19 at 18:00;  Status Hold


Acetaminophen/ Hydrocodone Bitart (Norco (5/325)) 1 tab Q6H  PRN PO PAIN LEVEL 


6-10 Last administered on 3/19/19at 21:03; Admin Dose 1 TAB;  Start 3/12/19 at 


18:30


Acetaminophen (Tylenol Tab) 650 mg Q6H  PRN PO MILD PAIN(1-3)OR ELEVATED TEMP;  


Start 3/12/19 at 18:30


Ibuprofen (Motrin) 600 mg Q6H  PRN PO PAIN LEVEL 1-5 Last administered on 


3/21/19at 17:50; Admin Dose 600 MG;  Start 3/12/19 at 18:30


Ondansetron HCl (Zofran Inj) 4 mg Q6H  PRN IV NAUSEA AND/OR VOMITING Last 


administered on 3/18/19at 06:49; Admin Dose 4 MG;  Start 3/12/19 at 18:30


Miscellaneous Information (* Miscellaneous Pharmacy Order) DURAMORPH: 5 MG 


EPIDU... GIVEN NEURAXIAL XX ;  Start 3/12/19 at 21:00


Phenol (Chloraseptic Throat Spray) 2 spray Q2H  PRN MT SORE THROAT Last a


dministered on 3/16/19at 21:48; Admin Dose 2 SPRAY;  Start 3/13/19 at 17:30


Naloxone HCl (Narcan) 0.2 mg Q2M  PRN IV .RESP RATE;  Start 3/14/19 at 10:30


Fentanyl/ Ropivacaine 100 ml CONT EPIDURAL EPI  Last administered on 3/18/19at 


05:04; Admin Dose 100 ML;  Start 3/15/19 at 10:15


Zolpidem Tartrate (Ambien) 5 mg HS  PRN PO INSOMNIA Last administered on 


3/16/19at 22:03; Admin Dose 5 MG;  Start 3/15/19 at 16:00


Insulin Glargine (Lantus) 15 units DAILY@0800 SC  Last administered on 3/21/19at


10:31; Admin Dose 15 UNITS;  Start 3/17/19 at 08:00


Insulin Aspart (Novolog Insulin Pen) NOVOLOG *MODERATE* ALGORITHM AC MEALS AND  


BEDTIME SC  Last administered on 3/21/19at 17:55; Admin Dose 4 UNIT;  Start 


3/18/19 at 12:30


Morphine Sulfate (morphine) 2 mg Q8H  PRN IV SEVERE PAIN LEVEL 7-10 Last 


administered on 3/21/19at 13:16; Admin Dose 2 MG;  Start 3/19/19 at 16:00


Simethicone (Mylicon) 80 mg Q8  PRN PO DISTENSION/GAS/BLOATING Last administered


on 3/21/19at 14:51; Admin Dose 80 MG;  Start 3/20/19 at 05:00


Polyethylene Glycol (Miralax) 17 gm DAILY  PRN PO constipation Last administered


on 3/21/19at 13:45; Admin Dose 17 GM;  Start 3/20/19 at 18:00











SHERINE GOLDSMITH               Mar 21, 2019 18:31

## 2019-03-22 VITALS — DIASTOLIC BLOOD PRESSURE: 64 MMHG | SYSTOLIC BLOOD PRESSURE: 102 MMHG | HEART RATE: 68 BPM | RESPIRATION RATE: 19 BRPM

## 2019-03-22 VITALS — SYSTOLIC BLOOD PRESSURE: 119 MMHG | HEART RATE: 78 BPM | DIASTOLIC BLOOD PRESSURE: 62 MMHG | RESPIRATION RATE: 18 BRPM

## 2019-03-22 VITALS — SYSTOLIC BLOOD PRESSURE: 121 MMHG | HEART RATE: 76 BPM | DIASTOLIC BLOOD PRESSURE: 72 MMHG | RESPIRATION RATE: 18 BRPM

## 2019-03-22 RX ADMIN — POLYETHYLENE GLYCOL 3350 PRN GM: 17 POWDER, FOR SOLUTION ORAL at 09:09

## 2019-03-22 RX ADMIN — ZOLPIDEM TARTRATE 1 MG: 5 TABLET, FILM COATED ORAL at 00:17

## 2019-03-22 RX ADMIN — MORPHINE SULFATE 1 MG: 2 INJECTION, SOLUTION INTRAMUSCULAR; INTRAVENOUS at 06:49

## 2019-03-22 RX ADMIN — INSULIN ASPART 1 UNIT: 100 INJECTION, SOLUTION INTRAVENOUS; SUBCUTANEOUS at 18:11

## 2019-03-22 RX ADMIN — INSULIN ASPART 1 UNIT: 100 INJECTION, SOLUTION INTRAVENOUS; SUBCUTANEOUS at 13:04

## 2019-03-22 RX ADMIN — ZOLPIDEM TARTRATE PRN MG: 5 TABLET, FILM COATED ORAL at 00:17

## 2019-03-22 RX ADMIN — HYDROCODONE BITARTRATE AND ACETAMINOPHEN PRN TAB: 5; 325 TABLET ORAL at 16:00

## 2019-03-22 RX ADMIN — MORPHINE SULFATE PRN MG: 2 INJECTION, SOLUTION INTRAMUSCULAR; INTRAVENOUS at 06:49

## 2019-03-22 RX ADMIN — HYDROCODONE BITARTRATE AND ACETAMINOPHEN 1 TAB: 5; 325 TABLET ORAL at 00:22

## 2019-03-22 RX ADMIN — INSULIN GLARGINE 1 UNITS: 100 INJECTION, SOLUTION SUBCUTANEOUS at 09:09

## 2019-03-22 RX ADMIN — HYDROCODONE BITARTRATE AND ACETAMINOPHEN 1 TAB: 5; 325 TABLET ORAL at 16:00

## 2019-03-22 RX ADMIN — SODIUM FERRIC GLUCONATE COMPLEX 1 MLS/HR: 12.5 INJECTION INTRAVENOUS at 13:50

## 2019-03-22 RX ADMIN — HYDROCODONE BITARTRATE AND ACETAMINOPHEN PRN TAB: 5; 325 TABLET ORAL at 00:22

## 2019-03-22 RX ADMIN — POLYETHYLENE GLYCOL 3350 1 GM: 17 POWDER, FOR SOLUTION ORAL at 09:09

## 2019-03-22 RX ADMIN — INSULIN GLARGINE SCH UNITS: 100 INJECTION, SOLUTION SUBCUTANEOUS at 09:09

## 2019-03-22 RX ADMIN — INSULIN ASPART 1 UNIT: 100 INJECTION, SOLUTION INTRAVENOUS; SUBCUTANEOUS at 09:08

## 2019-03-22 NOTE — CONS
Assessment/Plan


Assessment/Plan


Hospital Course (Demo Recall)


IMPRESSION:


1.  Preoperative evaluation in a patient prior to colectomy that has multiple 


cardiac risk factors and possible prior stent apparently placed approximately 2 


to 3 years prior.-neg trop x 2. Now Post-op s/p surgery with clean margins by 


path


2.  Hypotension, borderline.-ongoing today


3.  History of possible prior stent placements 2 to 3 years prior per chart 


biopsy.


4.  Dyslipidemia.


5.  Adenocarcinoma of the colon, newly diagnosed.


6.  Abdominal pain secondary to adenocarcinoma of the colon.


7.  Diabetes mellitus.


8.  Anemia.


9.  Renal failure insufficiency.





Recc:


-Follow BP/volume status closely


-routine post-op care


-pain control


-Follow BS


-Oncology following





Consultation Date/Type/Reason


Admit Date/Time


Mar 1, 2019 at 20:38


Initial Consult Date


3/10/19


Type of Consult


Cardiology


Reason for Consultation


Preop


Requesting Provider:  HANNAH DOWNING MD


Date/Time of Note


DATE: 3/22/19 


TIME: 16:09





Exam/Review of Systems


Vital Signs


Vitals





Vital Signs


  Date      Temp  Pulse  Resp  B/P (MAP)   Pulse Ox  O2          O2 Flow    FiO2


Time                                                 Delivery    Rate


   3/22/19  97.2     78    18      119/62        96


     15:06                           (81)


   3/20/19                                           Room Air


     15:24


   3/18/19                                                                    21


     23:47








Intake and Output





3/21/19


3/21/19


3/22/19





1515:00


23:00


07:00





IntakeIntake Total


400 ml


200 ml





BalanceBalance


400 ml


200 ml














Exam


Exam


Review of Systems:


CONSTITUTIONAL:  No fevers, chills.


PULMONARY:  No sob


CARDIOVASCULAR: No chest pain/palpitations


GASTROINTESTINAL:  No nausea/vomiting.


GENITOURINARY:  No hematuria/dysuria.


MUSCULOSKELETAL:  No myagias/arthalgias.


PSYCHIATRIC:  The patient denies depression.


NEUROLOGIC:   No weakness


Constitutional:  alert


Psych:  no complaints


Head:  normocephalic


ENMT:  mucosa pink and moist


Neck:  supple, jvd (9 cm water)


Respiratory:  clear to auscultation


Cardiovascular:  regular rate and rhythm


Gastrointestinal:  soft, non-tender


Musculoskeletal:  muscle tone (normal)


Extremities:  edema (none)


Neurological:  other (No focal deficits)





Labs


Result Diagram:  


3/21/19 0420





Results 24hrs





Laboratory Tests


  Test
            3/21/19
17:51  3/21/19
20:17  3/22/19
09:04  3/22/19
13:00


  Bedside Glucose          204            117           234  H         280  H








Medications


Medications





Current Medications


IV Flush (NS 3 ml) 3 ml PER PROTOCOL IV  Last administered on 3/19/19at 21:04; 


Admin Dose 3 ML;  Start 3/2/19 at 00:00


Zolpidem Tartrate (Ambien) 10 mg QHS  PRN PO .INSOMNIA Last administered on 


3/22/19at 00:17; Admin Dose 10 MG;  Start 3/2/19 at 00:00


Albuterol/ Ipratropium (Duoneb) 3 ml Q2H RESP THERAPY  PRN HHN SHORTNESS OF 


BREATH;  Start 3/2/19 at 00:00


Miscellaneous Information 1 ea NOTE XX ;  Start 3/2/19 at 00:00


Glucose (Glutose) 15 gm Q15M  PRN PO DECREASED GLUCOSE;  Start 3/2/19 at 00:00


Glucose (Glutose) 22.5 gm Q15M  PRN PO DECREASED GLUCOSE;  Start 3/2/19 at 00:00


Dextrose (D50w Syringe) 25 ml Q15M  PRN IV DECREASED GLUCOSE;  Start 3/2/19 at 


00:00


Dextrose (D50w Syringe) 50 ml Q15M  PRN IV DECREASED GLUCOSE;  Start 3/2/19 at 


00:00


Glucagon (Glucagen) 1 mg Q15M  PRN IM DECREASED GLUCOSE;  Start 3/2/19 at 00:00


Glucose (Glutose) 15 gm Q15M  PRN BUCCAL DECREASED GLUCOSE;  Start 3/2/19 at 


00:00


Insulin Aspart (Novolog Insulin Pen) 8 unit WITH  MEALS SC  Last administered on


3/11/19at 17:58; Admin Dose 8 UNIT;  Start 3/9/19 at 18:00;  Status Hold


Acetaminophen/ Hydrocodone Bitart (Norco (5/325)) 1 tab Q6H  PRN PO PAIN LEVEL 


6-10 Last administered on 3/22/19at 16:00; Admin Dose 1 TAB;  Start 3/12/19 at 


18:30


Acetaminophen (Tylenol Tab) 650 mg Q6H  PRN PO MILD PAIN(1-3)OR ELEVATED TEMP;  


Start 3/12/19 at 18:30


Ibuprofen (Motrin) 600 mg Q6H  PRN PO PAIN LEVEL 1-5 Last administered on 


3/21/19at 17:50; Admin Dose 600 MG;  Start 3/12/19 at 18:30


Ondansetron HCl (Zofran Inj) 4 mg Q6H  PRN IV NAUSEA AND/OR VOMITING Last 


administered on 3/18/19at 06:49; Admin Dose 4 MG;  Start 3/12/19 at 18:30


Miscellaneous Information (* Miscellaneous Pharmacy Order) DURAMORPH: 5 MG 


EPIDU... GIVEN NEURAXIAL XX ;  Start 3/12/19 at 21:00


Phenol (Chloraseptic Throat Spray) 2 spray Q2H  PRN MT SORE THROAT Last 


administered on 3/16/19at 21:48; Admin Dose 2 SPRAY;  Start 3/13/19 at 17:30


Naloxone HCl (Narcan) 0.2 mg Q2M  PRN IV .RESP RATE;  Start 3/14/19 at 10:30


Fentanyl/ Ropivacaine 100 ml CONT EPIDURAL EPI  Last administered on 3/18/19at 


05:04; Admin Dose 100 ML;  Start 3/15/19 at 10:15


Zolpidem Tartrate (Ambien) 5 mg HS  PRN PO INSOMNIA Last administered on 3/16/19


at 22:03; Admin Dose 5 MG;  Start 3/15/19 at 16:00


Insulin Glargine (Lantus) 15 units DAILY@0800 SC  Last administered on 3/22/19at


09:09; Admin Dose 15 UNITS;  Start 3/17/19 at 08:00


Insulin Aspart (Novolog Insulin Pen) NOVOLOG *MODERATE* ALGORITHM AC MEALS AND  


BEDTIME SC  Last administered on 3/22/19at 13:04; Admin Dose 8 UNIT;  Start 


3/18/19 at 12:30


Morphine Sulfate (morphine) 2 mg Q8H  PRN IV SEVERE PAIN LEVEL 7-10 Last 


administered on 3/22/19at 06:49; Admin Dose 2 MG;  Start 3/19/19 at 16:00


Simethicone (Mylicon) 80 mg Q8  PRN PO DISTENSION/GAS/BLOATING Last administered


on 3/21/19at 14:51; Admin Dose 80 MG;  Start 3/20/19 at 05:00


Polyethylene Glycol (Miralax) 17 gm DAILY  PRN PO constipation Last administered


on 3/22/19at 09:09; Admin Dose 17 GM;  Start 3/20/19 at 18:00


Ferric Sodium Gluconate Complex 125 mg/Sodium Chloride 110 ml @  110 mls/hr 


DAILY@1300 IVPB  Last administered on 3/22/19at 13:50; Admin Dose 110 MLS/HR;  


Start 3/22/19 at 13:00;  Stop 3/26/19 at 13:59











SHERINE GOLDSMITH               Mar 22, 2019 16:11

## 2019-03-22 NOTE — DS
Date/Time of Note


Date/Time of Note


DATE: 3/22/19 


TIME: 13:48





Discharge Summary


Admission/Discharge Info


Admit Date/Time


Mar 1, 2019 at 20:38


Discharge Date/Time





Discharge Diagnosis


Colon adenocarcinoma


Patient Condition:  Stable


Hospital Course


57 yo male with DM who presented with hematochezia. 


He was found to have iron deficiency anemia and tranfused PRBCs and IV iron.  He


underwent colonscopy revealing a mass in the transverse colon.  Pathology 


revealed adenocarcinoma.  CT imaging of chest, abdomen, and pelvis revealed no 


evidence of metastatic disease.  He then underwent surgical resection with 


report as follows: 





"Patient was brought into the operating room, placed supine on the operating 


table, SCDs were placed, both arms were tucked, all pressure points were well-


padded, preoperative antibiotics administered, and after induction of anes


thesia, patient was placed in the lithotomy. Simpson was inserted. Patient was 


then prepped and draped in usual sterile fashion, and timeout was performed.





Incision was made in the right upper quadrant, and using an Optiview port and 5 


mm 0 scope abdomen was safely entered and insufflated to 15 mmHg with CO2. 


Laparoscopy was performed and no injuries were identified.  There were si


gnificant adhesions in the upper abdomen.  Under direct visualization 2 more 


fives were placed in the right lower and left lower quadrants.  No tattoo ink 


was identified.  No mass was identified.  However there was fullness in left 


upper quadrant.  Investigation identified small bowel resection in mid abdomen 


with primary anastomosis.  There was also significant adhesions of small bowel 


to the left upper quadrant to the liver and possible stomach.  The liver was 


plastered to the abdominal wall.





Local anesthetic injection was performed at all surgical sites initially.





Extensive lysis of adhesions was undertaken laparoscopically however I was not 


being very successful to identify the structures easily and decision was made to


convert to open.  





Midline incision was made and the previous scar was fully excised since it was 


large thickened and abnormal.  Abdomen was entered.





Further extensive lysis of adhesions were performed and I was able to identify 


that patient also had a gastrojejunostomy with a loop of bowel brought antecolic


and anastomosis to the stomach.  I was able to separate the omentum and small 


bowel from each other from the colon.  I made sure to preserve the 


gastrojejunostomy throughout the surgery.  I was able to separate the colon that


was underneath the gastrojejunostomy from the loop of jejunum on both sides.  


The colon was mobilized on both sides off of the wall along the left descending 


and right ascending colon.  Hepatic flexure was gently and meticulously 


mobilized with care taken not to injure the duodenum or any vasculature.  


Splenic mobilization was much more difficult due to the significant adhesions.  


This was the area where the mass was also identified within the colon.  Very 


meticulous tedious and gentle dissection was taken to separate the colon from 


the small bowel and from the spleen preserving all structures.  Posterior 


stomach was also plastered on the colon and the area of the mass and this was 


very gently shaved off using blunt dissection.  Several of the short gastrics 


had to be taken using LigaSure to be able to free up enough space and identified


the structures.  Once again this dissection was very tedious difficult and not 


long.





After fully identifying all structures and fully mobilizing the transverse colon


left and right colon with the flexures.  Mass was identified to be large about 


half to two thirds of the way of the transverse colon.  Decision was made to 


proceed with transverse colectomy.  The mesentery was divided using LigaSure 


middle colic was taken using LigaSure as well.  Colon was transected and distal 


ascending and mid descending and the specimen was fully mobilized and 


exteriorized.  Suture was placed proximally.  There was redundant sigmoid colon 


which was mobilized to be able to bring the left colon retrocrural 


gastrojejunostomy and to do a staple side-to-side colonic anastomosis.  Stay 


sutures were placed posteriorly.  Crotch suture was placed.  Anastomosis was 


made with a 75 blue load stapler.  Open end was closed with a TA blue 60 


followed by a 3-year-old Lembert silk sutures.





Possible internal hernia defect at this site were closed with silk sutures 


however to further secure the area and help with healing 10 x 15 cm xenograft 


biologic was placed over the anastomosis and posterior to the gastrojejunostomy.


 FloSeal was placed in left upper and right upper quadrants and there was 


complete hemostasis.





Abdomen was irrigated with 5 L of warm water to clear suctioning fluid.  There 


was no omentum left.  Part of omentum was also excised using LigaSure and sent 


to pathology.  Abdominal wall fascia was closed with #1 looped PDS sutures and 


also the hernia was were repaired primarily.  Wound was thoroughly irrigated and


interrupted 2-0 Vicryl subcutaneous sutures were placed to close off dead space.


 Skin staples were applied.  Betadine was applied.  Dressing was placed."





The patient remained hosptialized for the following week for pain control.





Pathology of the mass revealed:


"-Transverse colon, transverse colectomy:


-- Moderately-differentiated adenocarcinoma with mucinous differentiation, 


invades through the 


muscularis propria, focally into pericolonic tissue associated with marked 


luminal stenosis. 


-- Mesenteric lymph nodes with no evidence of metastatic adenocarcinoma (0/18). 


-- Margins are clear of adenocarcinoma. 


 


SURGICAL PATHOLOGY CANCER CASE SUMMARY: 


 


PROCEDURE:  Transverse colectomy.


 


TUMOR SITE:  Transverse colon. 


 


TUMOR SIZE:   11.0 x 6.0 x 1.5 cm.


 


MACROSCOPIC TUMOR PERFORATION:  Not identified. 


 


HISTOLOGIC TYPE:   Adenocarcinoma with mucinous differentiation.


 


HISTOLOGIC GRADE:  Moderately-differentiated.


 


TUMOR EXTENSION:  Tumor invades through the muscularis propria, focally into 


pericolonic tissue. 


 


MARGINS:  All margins are uninvolved by invasive carcinoma."











He has been seen by oncology who do not recommend any additional therapy at this


time.  He is planning to return to [facility] after discharge from the hosptial. I 


have advised him he will need to check in regularly with a doctor for 


surveillance and will need to have a colonsocopy in the next year.


Primary Care Provider


Not On Staff Doctor


Pending Labs





Laboratory Tests


Test
              3/21/19
17:51   3/21/19
20:17   3/22/19
09:04   3/22/19
13:00


Bedside                      204             117             234             280


Glucose
          mg/dL
()  mg/dL
()














HANNAH DOWNING MD          Mar 22, 2019 13:52

## 2019-03-22 NOTE — CONS
Assessment/Plan


Assessment/Plan


Hospital Course (Demo Recall)


57 yo with newly diagnosed colon adenoca


-CT chest shows 4 mm noncalcified nodule in the right apex, which is 


nonspecific. 


-CT AP: Irregular bowel wall thickening / mass with adjacent stranding of the 


distal transverse colon, compatible with given history of colon cancer. Shotty 


periaortic lymph nodes. The largest is an 11 mm node with normal fatty hilum, 


suggestive of reactive etiology.  Small nonspecific peripherally sclerotic 


lesion in the left iliac bone measuring 6 mm.


-bone scan negative for mets


-he is s/p surgery: per notes this revealed previous small bowel resection with 


primary anastomosis (antecolic over the cancer) and gastrojejunostomy; Ventral 


incisional Swiss cheese type hernia: s/p   Laparoscopic converted to open 


transverse colectomy, Revision of previous scar, Primary repair of ventral 


incisional Swiss cheese type hernia 3/12/19


-path shows T3N0 disease, no high risk features, can consider adjuvant xeloda or


5FU. once pt is fully recovered, will discuss further his treatment options as 


an outpt, but overall he has a good prognosis given early stage disease


-pt states he may fly back to [place] as he has no one to care for [place] in the 


[place]





#DM 


-continue lantus





#Anemia


-2/2 iron deficiency


-will start IV iron























#





Consultation Date/Type/Reason


Admit Date/Time


Mar 1, 2019 at 20:38


Initial Consult Date


3/10/19


Type of Consult


oncology


Reason for Consultation


colon cancer


Requesting Provider:  HANNAH DOWNING MD


Date/Time of Note


DATE: 3/22/19 


TIME: 08:27





24 HR Interval Summary


Free Text/Dictation


no acute overnight events





Exam/Review of Systems


Exam


Vitals





Vital Signs


  Date      Temp  Pulse  Resp  B/P (MAP)   Pulse Ox  O2          O2 Flow    FiO2


Time                                                 Delivery    Rate


   3/22/19  98.2     68    19      102/64        99


     07:21                           (77)


   3/20/19                                           Room Air


     15:24


   3/18/19                                                                    21


     23:47








Intake and Output





3/21/19


3/21/19


3/22/19





1414:59


22:59


06:59





IntakeIntake Total


400 ml


200 ml





BalanceBalance


400 ml


200 ml











Constitutional:  alert, oriented


Psych:  no complaints


Head:  normocephalic


Eyes:  nl conjunctiva


ENMT:  nl external ears & nose


Neck:  supple


Respiratory:  clear to auscultation


Cardiovascular:  regular rate and rhythm


Gastrointestinal:  soft


Musculoskeletal:  nl extremities to inspection


Extremities:  normal pulses





Results


Result Diagram:  


3/21/19 0420





Results 24hrs





Laboratory Tests


  Test
            3/21/19
08:51  3/21/19
10:28  3/21/19
13:19  3/21/19
17:51


  Bedside Glucose         301  H         331  H         358  H          204


  Test
            3/21/19
20:17  
              
              



  Bedside Glucose          117








Medications


Medication





Current Medications


IV Flush (NS 3 ml) 3 ml PER PROTOCOL IV  Last administered on 3/19/19at 21:04; 


Admin Dose 3 ML;  Start 3/2/19 at 00:00


Zolpidem Tartrate (Ambien) 10 mg QHS  PRN PO .INSOMNIA Last administered on 


3/22/19at 00:17; Admin Dose 10 MG;  Start 3/2/19 at 00:00


Albuterol/ Ipratropium (Duoneb) 3 ml Q2H RESP THERAPY  PRN HHN SHORTNESS OF HAKEEM


ATH;  Start 3/2/19 at 00:00


Miscellaneous Information 1 ea NOTE XX ;  Start 3/2/19 at 00:00


Glucose (Glutose) 15 gm Q15M  PRN PO DECREASED GLUCOSE;  Start 3/2/19 at 00:00


Glucose (Glutose) 22.5 gm Q15M  PRN PO DECREASED GLUCOSE;  Start 3/2/19 at 00:00


Dextrose (D50w Syringe) 25 ml Q15M  PRN IV DECREASED GLUCOSE;  Start 3/2/19 at 


00:00


Dextrose (D50w Syringe) 50 ml Q15M  PRN IV DECREASED GLUCOSE;  Start 3/2/19 at 


00:00


Glucagon (Glucagen) 1 mg Q15M  PRN IM DECREASED GLUCOSE;  Start 3/2/19 at 00:00


Glucose (Glutose) 15 gm Q15M  PRN BUCCAL DECREASED GLUCOSE;  Start 3/2/19 at 


00:00


Insulin Aspart (Novolog Insulin Pen) 8 unit WITH  MEALS SC  Last administered on


3/11/19at 17:58; Admin Dose 8 UNIT;  Start 3/9/19 at 18:00;  Status Hold


Acetaminophen/ Hydrocodone Bitart (Norco (5/325)) 1 tab Q6H  PRN PO PAIN LEVEL 


6-10 Last administered on 3/22/19at 00:22; Admin Dose 1 TAB;  Start 3/12/19 at 


18:30


Acetaminophen (Tylenol Tab) 650 mg Q6H  PRN PO MILD PAIN(1-3)OR ELEVATED TEMP;  


Start 3/12/19 at 18:30


Ibuprofen (Motrin) 600 mg Q6H  PRN PO PAIN LEVEL 1-5 Last administered on 


3/21/19at 17:50; Admin Dose 600 MG;  Start 3/12/19 at 18:30


Ondansetron HCl (Zofran Inj) 4 mg Q6H  PRN IV NAUSEA AND/OR VOMITING Last 


administered on 3/18/19at 06:49; Admin Dose 4 MG;  Start 3/12/19 at 18:30


Miscellaneous Information (* Miscellaneous Pharmacy Order) DURAMORPH: 5 MG 


EPIDU... GIVEN NEURAXIAL XX ;  Start 3/12/19 at 21:00


Phenol (Chloraseptic Throat Spray) 2 spray Q2H  PRN MT SORE THROAT Last 


administered on 3/16/19at 21:48; Admin Dose 2 SPRAY;  Start 3/13/19 at 17:30


Naloxone HCl (Narcan) 0.2 mg Q2M  PRN IV .RESP RATE;  Start 3/14/19 at 10:30


Fentanyl/ Ropivacaine 100 ml CONT EPIDURAL EPI  Last administered on 3/18/19at 


05:04; Admin Dose 100 ML;  Start 3/15/19 at 10:15


Zolpidem Tartrate (Ambien) 5 mg HS  PRN PO INSOMNIA Last administered on 


3/16/19at 22:03; Admin Dose 5 MG;  Start 3/15/19 at 16:00


Insulin Glargine (Lantus) 15 units DAILY@0800 SC  Last administered on 3/21/19at


10:31; Admin Dose 15 UNITS;  Start 3/17/19 at 08:00


Insulin Aspart (Novolog Insulin Pen) NOVOLOG *MODERATE* ALGORITHM AC MEALS AND  


BEDTIME SC  Last administered on 3/21/19at 17:55; Admin Dose 4 UNIT;  Start 


3/18/19 at 12:30


Morphine Sulfate (morphine) 2 mg Q8H  PRN IV SEVERE PAIN LEVEL 7-10 Last 


administered on 3/22/19at 06:49; Admin Dose 2 MG;  Start 3/19/19 at 16:00


Simethicone (Mylicon) 80 mg Q8  PRN PO DISTENSION/GAS/BLOATING Last administered


on 3/21/19at 14:51; Admin Dose 80 MG;  Start 3/20/19 at 05:00


Polyethylene Glycol (Miralax) 17 gm DAILY  PRN PO constipation Last administered


on 3/21/19at 13:45; Admin Dose 17 GM;  Start 3/20/19 at 18:00











ALONDRA LEVY M.D.              Mar 22, 2019 08:32

## 2019-03-22 NOTE — PN
Date/Time of Note


Date/Time of Note


DATE: 3/22/19 


TIME: 16:34





Assessment/Plan


Lines/Catheters


IV Catheter Type (from Nrsg):  Saline Lock


Amaya in Place (from Nrsg):  No





Assessment/Plan


Chief Complaint/Hosp Course


1.  Transverse colon adenocarcinoma,  Previous small bowel resection with 


primary anastomosis (antecolic over the cancer) and gastrojejunostomy; Ventral 


incisional Swiss cheese type hernia: s/p   Laparoscopic converted to open 


transverse colectomy, Revision of previous scar, Primary repair of ventral 


incisional Swiss cheese type hernia 3/12/19; +Bowel function.  Ileus resolved.  


Path noted > Oncology


-IS


-oob>ambulate qid


-ice pack to abdominal wall


-incision site local care> s/p DC staples, now with noted open area> local wound


care for this area (wound care education to be provided by nursing staff are to 


discharge)


-oncology follow-up


-pain control> transition to oral


-soft diet


-dc ok from surgical standpoint.  To follow in office in 1-2 weeks.  Patient to 


call and make appointment.


2.  40 pound weight loss secondary to above


3.  Diabetes mellitus type 2


-Nutrition medication optimization


4.  Hypertension


-Nutrition medication optimization


5.  Coronary artery disease with history of Plavix and aspirin


-Cardiac evaluation and optimization


6.  Iron deficiency anemia secondary to above


-Iron transfusion per hematology


-As above


7.  Hyperglycemia:


-glucose optimization





Thank you.  Patient seen and examined in collaboration with Dr. Helder Dugan.





Subjective


24 Hr Interval Summary


Feels well. Open area noted in midline incision. No fevers, chills, sob, 


congested cough, cp, palpitations, ha, dizziness, n/v/d/dysuria.





Exam/Review of Systems


Vital Signs


Vitals





Vital Signs


  Date      Temp  Pulse  Resp  B/P (MAP)   Pulse Ox  O2          O2 Flow    FiO2


Time                                                 Delivery    Rate


   3/22/19  97.2     78    18      119/62        96


     15:06                           (81)


   3/20/19                                           Room Air


     15:24


   3/18/19                                                                    21


     23:47








Intake and Output





3/21/19


3/21/19


3/22/19





1515:00


23:00


07:00





IntakeIntake Total


400 ml


200 ml





BalanceBalance


400 ml


200 ml














Exam


Free Text/Dictation


Constitutional:  alert, oriented; 


   No distress


Psych:  nl mood/affect; 


   No anxiety


Head:  normocephalic, atraumatic


Eyes:  nl conjunctiva, EOMI, PERRL; 


   No icteric


ENMT:  nl external ears & nose, mucosa pink and moist


Neck:  non-tender; 


   No jvd


Respiratory:  normal air movement; 


   No congested cough, No labored breathing


Cardiovascular:  regular rate and rhythm; 


   No edema


Gastrointestinal:  soft, min tender (tanner-incisional: midline incision: dry, no 


erythema, small open area)


   No distended, No rebound or guarding


Genitourinary - Male:  nl scrotum, amaya


Musculoskeletal:  nl extremities to inspection, nl gait and stance; 


   No joint tenderness


Extremities:  normal pulses; 


   No calf tenderness, No edema


Neurological:  nl mental status, nl speech, nl strength


Skin:  nl turgor; 


   No rash or lesions


Lymph:  nl lymph nodes





Results


Result Diagram:  


3/21/19 0420














PATRICIA ASHRAF NP       Mar 22, 2019 16:39